# Patient Record
Sex: FEMALE | Race: WHITE | NOT HISPANIC OR LATINO | URBAN - METROPOLITAN AREA
[De-identification: names, ages, dates, MRNs, and addresses within clinical notes are randomized per-mention and may not be internally consistent; named-entity substitution may affect disease eponyms.]

---

## 2017-11-06 ENCOUNTER — EMERGENCY (EMERGENCY)
Facility: HOSPITAL | Age: 62
LOS: 1 days | Discharge: HOME | End: 2017-11-06
Admitting: EMERGENCY MEDICINE

## 2017-11-06 VITALS
TEMPERATURE: 98 F | RESPIRATION RATE: 18 BRPM | HEART RATE: 95 BPM | OXYGEN SATURATION: 99 % | DIASTOLIC BLOOD PRESSURE: 79 MMHG | SYSTOLIC BLOOD PRESSURE: 149 MMHG

## 2017-11-06 NOTE — ED ADULT TRIAGE NOTE - CHIEF COMPLAINT QUOTE
pt c/o lower back pain radiating to right groin x several weeks, worse tonight, with urinary frequency. denies fevers, chills, n/v., also c/o epigastric pain x 1 week with dyspnea on exertion. appears comfortable in triage. EKG IP

## 2018-01-01 ENCOUNTER — OUTPATIENT (OUTPATIENT)
Dept: OUTPATIENT SERVICES | Facility: HOSPITAL | Age: 63
LOS: 1 days | End: 2018-01-01
Payer: MEDICAID

## 2018-01-01 PROCEDURE — G9001: CPT

## 2018-01-10 ENCOUNTER — EMERGENCY (EMERGENCY)
Facility: HOSPITAL | Age: 63
LOS: 1 days | Discharge: HOME | End: 2018-01-10
Attending: EMERGENCY MEDICINE
Payer: MEDICAID

## 2018-01-10 VITALS
SYSTOLIC BLOOD PRESSURE: 134 MMHG | HEART RATE: 97 BPM | DIASTOLIC BLOOD PRESSURE: 80 MMHG | WEIGHT: 134.04 LBS | TEMPERATURE: 98 F | RESPIRATION RATE: 18 BRPM | OXYGEN SATURATION: 97 % | HEIGHT: 64 IN

## 2018-01-10 PROCEDURE — 99285 EMERGENCY DEPT VISIT HI MDM: CPT | Mod: 25

## 2018-01-10 NOTE — ED ADULT TRIAGE NOTE - CHIEF COMPLAINT QUOTE
patient reports urinary frequency, right lower back pain, and right upper abdominal pain. patient complaining of distended abdomen

## 2018-01-11 VITALS — OXYGEN SATURATION: 95 %

## 2018-01-11 DIAGNOSIS — R69 ILLNESS, UNSPECIFIED: ICD-10-CM

## 2018-01-11 LAB
ALBUMIN SERPL ELPH-MCNC: 3.5 G/DL — SIGNIFICANT CHANGE UP (ref 3.5–5)
ALP SERPL-CCNC: 61 U/L — SIGNIFICANT CHANGE UP (ref 40–120)
ALT FLD-CCNC: 28 U/L DA — SIGNIFICANT CHANGE UP (ref 10–60)
ANION GAP SERPL CALC-SCNC: 7 MMOL/L — SIGNIFICANT CHANGE UP (ref 5–17)
APPEARANCE UR: CLEAR — SIGNIFICANT CHANGE UP
AST SERPL-CCNC: 17 U/L — SIGNIFICANT CHANGE UP (ref 10–40)
BASE EXCESS BLDA CALC-SCNC: -1.3 MMOL/L — SIGNIFICANT CHANGE UP (ref -2–2)
BASOPHILS # BLD AUTO: 0.1 K/UL — SIGNIFICANT CHANGE UP (ref 0–0.2)
BASOPHILS NFR BLD AUTO: 1.2 % — SIGNIFICANT CHANGE UP (ref 0–2)
BILIRUB SERPL-MCNC: 0.2 MG/DL — SIGNIFICANT CHANGE UP (ref 0.2–1.2)
BILIRUB UR-MCNC: NEGATIVE — SIGNIFICANT CHANGE UP
BLOOD GAS COMMENTS ARTERIAL: SIGNIFICANT CHANGE UP
BUN SERPL-MCNC: 13 MG/DL — SIGNIFICANT CHANGE UP (ref 7–18)
CALCIUM SERPL-MCNC: 9.3 MG/DL — SIGNIFICANT CHANGE UP (ref 8.4–10.5)
CHLORIDE SERPL-SCNC: 104 MMOL/L — SIGNIFICANT CHANGE UP (ref 96–108)
CO2 SERPL-SCNC: 30 MMOL/L — SIGNIFICANT CHANGE UP (ref 22–31)
COLOR SPEC: YELLOW — SIGNIFICANT CHANGE UP
CREAT SERPL-MCNC: 0.66 MG/DL — SIGNIFICANT CHANGE UP (ref 0.5–1.3)
DIFF PNL FLD: ABNORMAL
EOSINOPHIL # BLD AUTO: 0.2 K/UL — SIGNIFICANT CHANGE UP (ref 0–0.5)
EOSINOPHIL NFR BLD AUTO: 2.6 % — SIGNIFICANT CHANGE UP (ref 0–6)
GLUCOSE SERPL-MCNC: 129 MG/DL — HIGH (ref 70–99)
GLUCOSE UR QL: NEGATIVE — SIGNIFICANT CHANGE UP
HCO3 BLDA-SCNC: 24 MMOL/L — SIGNIFICANT CHANGE UP (ref 23–27)
HCT VFR BLD CALC: 46.9 % — HIGH (ref 34.5–45)
HGB BLD-MCNC: 15 G/DL — SIGNIFICANT CHANGE UP (ref 11.5–15.5)
HOROWITZ INDEX BLDA+IHG-RTO: 28 — SIGNIFICANT CHANGE UP
KETONES UR-MCNC: NEGATIVE — SIGNIFICANT CHANGE UP
LEUKOCYTE ESTERASE UR-ACNC: ABNORMAL
LIDOCAIN IGE QN: 108 U/L — SIGNIFICANT CHANGE UP (ref 73–393)
LYMPHOCYTES # BLD AUTO: 2.4 K/UL — SIGNIFICANT CHANGE UP (ref 1–3.3)
LYMPHOCYTES # BLD AUTO: 27.1 % — SIGNIFICANT CHANGE UP (ref 13–44)
MCHC RBC-ENTMCNC: 31.1 PG — SIGNIFICANT CHANGE UP (ref 27–34)
MCHC RBC-ENTMCNC: 32 GM/DL — SIGNIFICANT CHANGE UP (ref 32–36)
MCV RBC AUTO: 97.3 FL — SIGNIFICANT CHANGE UP (ref 80–100)
MONOCYTES # BLD AUTO: 0.5 K/UL — SIGNIFICANT CHANGE UP (ref 0–0.9)
MONOCYTES NFR BLD AUTO: 5.4 % — SIGNIFICANT CHANGE UP (ref 2–14)
NEUTROPHILS # BLD AUTO: 5.5 K/UL — SIGNIFICANT CHANGE UP (ref 1.8–7.4)
NEUTROPHILS NFR BLD AUTO: 63.6 % — SIGNIFICANT CHANGE UP (ref 43–77)
NITRITE UR-MCNC: NEGATIVE — SIGNIFICANT CHANGE UP
PCO2 BLDA: 48 MMHG — HIGH (ref 32–46)
PH BLDA: 7.33 — LOW (ref 7.35–7.45)
PH UR: 5 — SIGNIFICANT CHANGE UP (ref 5–8)
PLATELET # BLD AUTO: 267 K/UL — SIGNIFICANT CHANGE UP (ref 150–400)
PO2 BLDA: 70 MMHG — LOW (ref 74–108)
POTASSIUM SERPL-MCNC: 3.4 MMOL/L — LOW (ref 3.5–5.3)
POTASSIUM SERPL-SCNC: 3.4 MMOL/L — LOW (ref 3.5–5.3)
PROT SERPL-MCNC: 7.4 G/DL — SIGNIFICANT CHANGE UP (ref 6–8.3)
PROT UR-MCNC: NEGATIVE — SIGNIFICANT CHANGE UP
RBC # BLD: 4.82 M/UL — SIGNIFICANT CHANGE UP (ref 3.8–5.2)
RBC # FLD: 12.9 % — SIGNIFICANT CHANGE UP (ref 10.3–14.5)
SAO2 % BLDA: 92 % — SIGNIFICANT CHANGE UP (ref 92–96)
SODIUM SERPL-SCNC: 141 MMOL/L — SIGNIFICANT CHANGE UP (ref 135–145)
SP GR SPEC: 1.02 — SIGNIFICANT CHANGE UP (ref 1.01–1.02)
TROPONIN I SERPL-MCNC: <0.015 NG/ML — SIGNIFICANT CHANGE UP (ref 0–0.04)
UROBILINOGEN FLD QL: 1
WBC # BLD: 8.7 K/UL — SIGNIFICANT CHANGE UP (ref 3.8–10.5)
WBC # FLD AUTO: 8.7 K/UL — SIGNIFICANT CHANGE UP (ref 3.8–10.5)

## 2018-01-11 PROCEDURE — 71046 X-RAY EXAM CHEST 2 VIEWS: CPT | Mod: 26

## 2018-01-11 PROCEDURE — 80053 COMPREHEN METABOLIC PANEL: CPT

## 2018-01-11 PROCEDURE — 82803 BLOOD GASES ANY COMBINATION: CPT

## 2018-01-11 PROCEDURE — 74177 CT ABD & PELVIS W/CONTRAST: CPT

## 2018-01-11 PROCEDURE — 83690 ASSAY OF LIPASE: CPT

## 2018-01-11 PROCEDURE — 71046 X-RAY EXAM CHEST 2 VIEWS: CPT

## 2018-01-11 PROCEDURE — 99284 EMERGENCY DEPT VISIT MOD MDM: CPT | Mod: 25

## 2018-01-11 PROCEDURE — 81001 URINALYSIS AUTO W/SCOPE: CPT

## 2018-01-11 PROCEDURE — 84484 ASSAY OF TROPONIN QUANT: CPT

## 2018-01-11 PROCEDURE — 87086 URINE CULTURE/COLONY COUNT: CPT

## 2018-01-11 PROCEDURE — 85027 COMPLETE CBC AUTOMATED: CPT

## 2018-01-11 PROCEDURE — 94640 AIRWAY INHALATION TREATMENT: CPT

## 2018-01-11 PROCEDURE — 96374 THER/PROPH/DIAG INJ IV PUSH: CPT | Mod: XU

## 2018-01-11 PROCEDURE — 83880 ASSAY OF NATRIURETIC PEPTIDE: CPT

## 2018-01-11 PROCEDURE — 93005 ELECTROCARDIOGRAM TRACING: CPT

## 2018-01-11 PROCEDURE — 74177 CT ABD & PELVIS W/CONTRAST: CPT | Mod: 26

## 2018-01-11 RX ORDER — IPRATROPIUM/ALBUTEROL SULFATE 18-103MCG
3 AEROSOL WITH ADAPTER (GRAM) INHALATION ONCE
Qty: 0 | Refills: 0 | Status: COMPLETED | OUTPATIENT
Start: 2018-01-11 | End: 2018-01-11

## 2018-01-11 RX ORDER — ALBUTEROL 90 UG/1
4 AEROSOL, METERED ORAL ONCE
Qty: 0 | Refills: 0 | Status: COMPLETED | OUTPATIENT
Start: 2018-01-11 | End: 2018-01-11

## 2018-01-11 RX ORDER — SODIUM CHLORIDE 9 MG/ML
1000 INJECTION INTRAMUSCULAR; INTRAVENOUS; SUBCUTANEOUS ONCE
Qty: 0 | Refills: 0 | Status: COMPLETED | OUTPATIENT
Start: 2018-01-11 | End: 2018-01-11

## 2018-01-11 RX ORDER — SODIUM CHLORIDE 9 MG/ML
3 INJECTION INTRAMUSCULAR; INTRAVENOUS; SUBCUTANEOUS ONCE
Qty: 0 | Refills: 0 | Status: COMPLETED | OUTPATIENT
Start: 2018-01-11 | End: 2018-01-11

## 2018-01-11 RX ORDER — MORPHINE SULFATE 50 MG/1
2 CAPSULE, EXTENDED RELEASE ORAL ONCE
Qty: 0 | Refills: 0 | Status: DISCONTINUED | OUTPATIENT
Start: 2018-01-11 | End: 2018-01-11

## 2018-01-11 RX ADMIN — MORPHINE SULFATE 2 MILLIGRAM(S): 50 CAPSULE, EXTENDED RELEASE ORAL at 02:00

## 2018-01-11 RX ADMIN — SODIUM CHLORIDE 3 MILLILITER(S): 9 INJECTION INTRAMUSCULAR; INTRAVENOUS; SUBCUTANEOUS at 01:31

## 2018-01-11 RX ADMIN — Medication 3 MILLILITER(S): at 05:52

## 2018-01-11 RX ADMIN — MORPHINE SULFATE 2 MILLIGRAM(S): 50 CAPSULE, EXTENDED RELEASE ORAL at 01:30

## 2018-01-11 RX ADMIN — SODIUM CHLORIDE 1000 MILLILITER(S): 9 INJECTION INTRAMUSCULAR; INTRAVENOUS; SUBCUTANEOUS at 01:31

## 2018-01-11 RX ADMIN — ALBUTEROL 4 PUFF(S): 90 AEROSOL, METERED ORAL at 08:02

## 2018-01-11 NOTE — ED PROVIDER NOTE - OBJECTIVE STATEMENT
61 y/o female with significant PMHx UTI, presents to the ED c/o abd pain and distension x 2 weeks. Pt notes stomach was becoming increasingly distended, which prompted visit to ED today. Pt also reports knot in RUQ of abd with associated soreness. Pt denies vomiting, diarrhea, fever, hematuria, dysuria or any other complaints.

## 2018-01-11 NOTE — ED ADULT NURSE NOTE - OBJECTIVE STATEMENT
Pt states that she is having abd pain and urinary frequency and not able to hold bladder. pt denies burning during urination, fever and chills

## 2018-01-11 NOTE — ED PROVIDER NOTE - CONDUCTED A DETAILED DISCUSSION WITH PATIENT AND/OR GUARDIAN REGARDING, MDM
need for outpatient follow-up/return to ED if symptoms worsen, persist or questions arise radiology results/need for outpatient follow-up/lab results/return to ED if symptoms worsen, persist or questions arise

## 2018-01-11 NOTE — ED PROVIDER NOTE - PROGRESS NOTE DETAILS
labs unremarkable, UA neg for uti, CT A/P neg  discussed above results with patient. On reeval patient reports abd pain resolved. On repeat VS, O2sat noted to be 87% on RA, CXR unremarkable, given duoneb in setting of smoking hx with improvement of O2sat >95% on RA patient stable for discharge. patient instructed to followup with PMD.

## 2018-01-12 LAB
CULTURE RESULTS: SIGNIFICANT CHANGE UP
SPECIMEN SOURCE: SIGNIFICANT CHANGE UP

## 2018-06-14 ENCOUNTER — APPOINTMENT (OUTPATIENT)
Dept: GASTROENTEROLOGY | Facility: CLINIC | Age: 63
End: 2018-06-14
Payer: MEDICAID

## 2018-06-14 VITALS
HEIGHT: 62 IN | DIASTOLIC BLOOD PRESSURE: 70 MMHG | TEMPERATURE: 97.7 F | WEIGHT: 139 LBS | SYSTOLIC BLOOD PRESSURE: 120 MMHG | BODY MASS INDEX: 25.58 KG/M2

## 2018-06-14 DIAGNOSIS — Z82.49 FAMILY HISTORY OF ISCHEMIC HEART DISEASE AND OTHER DISEASES OF THE CIRCULATORY SYSTEM: ICD-10-CM

## 2018-06-14 DIAGNOSIS — Z12.11 ENCOUNTER FOR SCREENING FOR MALIGNANT NEOPLASM OF COLON: ICD-10-CM

## 2018-06-14 DIAGNOSIS — Z86.39 PERSONAL HISTORY OF OTHER ENDOCRINE, NUTRITIONAL AND METABOLIC DISEASE: ICD-10-CM

## 2018-06-14 DIAGNOSIS — Z63.4 DISAPPEARANCE AND DEATH OF FAMILY MEMBER: ICD-10-CM

## 2018-06-14 DIAGNOSIS — F17.200 NICOTINE DEPENDENCE, UNSPECIFIED, UNCOMPLICATED: ICD-10-CM

## 2018-06-14 DIAGNOSIS — Z86.59 PERSONAL HISTORY OF OTHER MENTAL AND BEHAVIORAL DISORDERS: ICD-10-CM

## 2018-06-14 DIAGNOSIS — K80.20 CALCULUS OF GALLBLADDER W/OUT CHOLECYSTITIS W/OUT OBSTRUCTION: ICD-10-CM

## 2018-06-14 DIAGNOSIS — R19.4 CHANGE IN BOWEL HABIT: ICD-10-CM

## 2018-06-14 DIAGNOSIS — Z78.9 OTHER SPECIFIED HEALTH STATUS: ICD-10-CM

## 2018-06-14 DIAGNOSIS — R19.7 DIARRHEA, UNSPECIFIED: ICD-10-CM

## 2018-06-14 PROCEDURE — 99204 OFFICE O/P NEW MOD 45 MIN: CPT

## 2018-06-14 RX ORDER — MIRTAZAPINE 15 MG/1
15 TABLET, FILM COATED ORAL
Refills: 0 | Status: ACTIVE | COMMUNITY

## 2018-06-14 RX ORDER — ZOLPIDEM TARTRATE 5 MG/1
5 TABLET ORAL
Refills: 0 | Status: ACTIVE | COMMUNITY

## 2018-06-14 RX ORDER — PRAVASTATIN SODIUM 20 MG/1
20 TABLET ORAL
Refills: 0 | Status: ACTIVE | COMMUNITY

## 2018-06-14 RX ORDER — CLONAZEPAM 0.5 MG/1
0.5 TABLET ORAL
Refills: 0 | Status: ACTIVE | COMMUNITY

## 2018-06-14 RX ORDER — SIMETHICONE 80 MG/1
80 TABLET, CHEWABLE ORAL
Refills: 0 | Status: ACTIVE | COMMUNITY

## 2018-06-14 RX ORDER — PAROXETINE HYDROCHLORIDE 30 MG/1
30 TABLET, FILM COATED ORAL
Refills: 0 | Status: ACTIVE | COMMUNITY

## 2018-06-14 RX ORDER — MELOXICAM 15 MG/1
15 TABLET ORAL
Refills: 0 | Status: ACTIVE | COMMUNITY

## 2018-06-14 RX ORDER — FENOFIBRATE 160 MG/1
160 TABLET ORAL
Refills: 0 | Status: ACTIVE | COMMUNITY

## 2018-06-14 SDOH — SOCIAL STABILITY - SOCIAL INSECURITY: DISSAPEARANCE AND DEATH OF FAMILY MEMBER: Z63.4

## 2018-06-20 ENCOUNTER — OUTPATIENT (OUTPATIENT)
Dept: OUTPATIENT SERVICES | Facility: HOSPITAL | Age: 63
LOS: 1 days | Discharge: HOME | End: 2018-06-20
Payer: MEDICAID

## 2018-06-20 ENCOUNTER — APPOINTMENT (OUTPATIENT)
Dept: GASTROENTEROLOGY | Facility: HOSPITAL | Age: 63
End: 2018-06-20
Payer: MEDICAID

## 2018-06-20 ENCOUNTER — RESULT REVIEW (OUTPATIENT)
Age: 63
End: 2018-06-20

## 2018-06-20 DIAGNOSIS — Z12.11 ENCOUNTER FOR SCREENING FOR MALIGNANT NEOPLASM OF COLON: ICD-10-CM

## 2018-06-20 PROCEDURE — 88305 TISSUE EXAM BY PATHOLOGIST: CPT | Mod: 26

## 2018-06-20 PROCEDURE — 88312 SPECIAL STAINS GROUP 1: CPT | Mod: 26

## 2018-06-20 PROCEDURE — 45380 COLONOSCOPY AND BIOPSY: CPT | Mod: 33

## 2018-06-20 PROCEDURE — 43239 EGD BIOPSY SINGLE/MULTIPLE: CPT

## 2018-06-21 LAB — SURGICAL PATHOLOGY STUDY: SIGNIFICANT CHANGE UP

## 2018-06-25 PROBLEM — K80.20 GALLSTONES: Status: ACTIVE | Noted: 2018-06-25

## 2018-07-12 ENCOUNTER — APPOINTMENT (OUTPATIENT)
Dept: GASTROENTEROLOGY | Facility: CLINIC | Age: 63
End: 2018-07-12

## 2018-08-23 ENCOUNTER — RX RENEWAL (OUTPATIENT)
Age: 63
End: 2018-08-23

## 2018-08-23 RX ORDER — RIFAXIMIN 550 MG/1
550 TABLET ORAL 3 TIMES DAILY
Qty: 42 | Refills: 1 | Status: ACTIVE | COMMUNITY
Start: 2018-06-25 | End: 1900-01-01

## 2018-08-23 RX ORDER — OMEPRAZOLE 20 MG/1
20 CAPSULE, DELAYED RELEASE ORAL DAILY
Qty: 1 | Refills: 3 | Status: ACTIVE | COMMUNITY
Start: 2018-06-14 | End: 1900-01-01

## 2019-11-01 ENCOUNTER — OUTPATIENT (OUTPATIENT)
Dept: OUTPATIENT SERVICES | Facility: HOSPITAL | Age: 64
LOS: 1 days | End: 2019-11-01
Payer: MEDICAID

## 2019-11-01 PROCEDURE — G9001: CPT

## 2019-11-06 DIAGNOSIS — Z71.89 OTHER SPECIFIED COUNSELING: ICD-10-CM

## 2019-11-06 PROBLEM — N39.0 URINARY TRACT INFECTION, SITE NOT SPECIFIED: Chronic | Status: ACTIVE | Noted: 2018-01-11

## 2020-12-16 PROBLEM — Z12.11 ENCOUNTER FOR SCREENING COLONOSCOPY: Status: RESOLVED | Noted: 2018-06-14 | Resolved: 2020-12-16

## 2021-02-07 ENCOUNTER — TRANSCRIPTION ENCOUNTER (OUTPATIENT)
Age: 66
End: 2021-02-07

## 2021-03-19 ENCOUNTER — TRANSCRIPTION ENCOUNTER (OUTPATIENT)
Age: 66
End: 2021-03-19

## 2022-09-17 ENCOUNTER — NON-APPOINTMENT (OUTPATIENT)
Age: 67
End: 2022-09-17

## 2023-06-19 ENCOUNTER — NON-APPOINTMENT (OUTPATIENT)
Age: 68
End: 2023-06-19

## 2023-08-21 ENCOUNTER — INPATIENT (INPATIENT)
Facility: HOSPITAL | Age: 68
LOS: 2 days | Discharge: HOME | DRG: 190 | End: 2023-08-24
Attending: INTERNAL MEDICINE | Admitting: HOSPITALIST
Payer: COMMERCIAL

## 2023-08-21 VITALS
SYSTOLIC BLOOD PRESSURE: 140 MMHG | WEIGHT: 134.04 LBS | HEART RATE: 87 BPM | DIASTOLIC BLOOD PRESSURE: 72 MMHG | RESPIRATION RATE: 20 BRPM | HEIGHT: 62 IN | OXYGEN SATURATION: 92 % | TEMPERATURE: 98 F

## 2023-08-21 LAB — GAS PNL BLDV: SIGNIFICANT CHANGE UP

## 2023-08-21 RX ORDER — IPRATROPIUM/ALBUTEROL SULFATE 18-103MCG
3 AEROSOL WITH ADAPTER (GRAM) INHALATION
Refills: 0 | Status: DISCONTINUED | OUTPATIENT
Start: 2023-08-21 | End: 2023-08-22

## 2023-08-21 NOTE — ED ADULT NURSE NOTE - CCCP TRG CHIEF CMPLNT
Daughter at bedside, updated on plan of care, denies needs.       Natalia Cervantes RN  08/07/22 9721 abnormal EKG/shortness of breath

## 2023-08-21 NOTE — ED ADULT NURSE NOTE - OBJECTIVE STATEMENT
PT is a 69yo f bibems c/o SOB x 4 days. Pt states that she was at home relaxing and felt SOB that she has been feeling over the past 4 days, pt daughter ordered pulse oximeter and reading obtained was 84% on RA. PT states that she received her flu shot approx 4 days ago and has been feeling weak and tired ever since, had an episode of medial back pain yesterday radiating across, but resolved. PT states that she went to see her cardiologist and received abnormal  EKG, was scheduled for outpatient echo and stress test sept 13. Pt has hx of smoking for approx 40 years, but quit 4 months ago. PMH of DM, HLD. PT A,Ox4, ambulatory at baseline, pt placed on cardiac monitor, in NSR, placed on 4LNC. Respirations even and unlabored, abd soft, nondistended and nontender, skin warm, dry and intact, BARTON. Denies HA, n/v/d, fever, chills and urinary symptoms. Stretcher locked in lowest position, appropriate side rails up for safety, pt instructed to call for RN if anything needed.

## 2023-08-22 DIAGNOSIS — R06.02 SHORTNESS OF BREATH: ICD-10-CM

## 2023-08-22 DIAGNOSIS — R09.02 HYPOXEMIA: ICD-10-CM

## 2023-08-22 DIAGNOSIS — Z96.641 PRESENCE OF RIGHT ARTIFICIAL HIP JOINT: Chronic | ICD-10-CM

## 2023-08-22 DIAGNOSIS — J44.1 CHRONIC OBSTRUCTIVE PULMONARY DISEASE WITH (ACUTE) EXACERBATION: ICD-10-CM

## 2023-08-22 DIAGNOSIS — J96.01 ACUTE RESPIRATORY FAILURE WITH HYPOXIA: ICD-10-CM

## 2023-08-22 LAB
A1C WITH ESTIMATED AVERAGE GLUCOSE RESULT: 6.5 % — HIGH (ref 4–5.6)
ALBUMIN SERPL ELPH-MCNC: 4.7 G/DL — SIGNIFICANT CHANGE UP (ref 3.3–5)
ALBUMIN SERPL ELPH-MCNC: 4.8 G/DL — SIGNIFICANT CHANGE UP (ref 3.3–5)
ALP SERPL-CCNC: 32 U/L — LOW (ref 40–120)
ALP SERPL-CCNC: 32 U/L — LOW (ref 40–120)
ALT FLD-CCNC: 12 U/L — SIGNIFICANT CHANGE UP (ref 10–45)
ALT FLD-CCNC: 14 U/L — SIGNIFICANT CHANGE UP (ref 10–45)
ANION GAP SERPL CALC-SCNC: 12 MMOL/L — SIGNIFICANT CHANGE UP (ref 5–17)
ANION GAP SERPL CALC-SCNC: 14 MMOL/L — SIGNIFICANT CHANGE UP (ref 5–17)
AST SERPL-CCNC: 15 U/L — SIGNIFICANT CHANGE UP (ref 10–40)
AST SERPL-CCNC: 21 U/L — SIGNIFICANT CHANGE UP (ref 10–40)
BASE EXCESS BLDV CALC-SCNC: 2.8 MMOL/L — SIGNIFICANT CHANGE UP (ref -2–3)
BASOPHILS # BLD AUTO: 0.03 K/UL — SIGNIFICANT CHANGE UP (ref 0–0.2)
BASOPHILS # BLD AUTO: 0.06 K/UL — SIGNIFICANT CHANGE UP (ref 0–0.2)
BASOPHILS NFR BLD AUTO: 0.5 % — SIGNIFICANT CHANGE UP (ref 0–2)
BASOPHILS NFR BLD AUTO: 1 % — SIGNIFICANT CHANGE UP (ref 0–2)
BILIRUB SERPL-MCNC: 0.2 MG/DL — SIGNIFICANT CHANGE UP (ref 0.2–1.2)
BILIRUB SERPL-MCNC: 0.3 MG/DL — SIGNIFICANT CHANGE UP (ref 0.2–1.2)
BUN SERPL-MCNC: 17 MG/DL — SIGNIFICANT CHANGE UP (ref 7–23)
BUN SERPL-MCNC: 20 MG/DL — SIGNIFICANT CHANGE UP (ref 7–23)
CA-I SERPL-SCNC: 1.19 MMOL/L — SIGNIFICANT CHANGE UP (ref 1.15–1.33)
CALCIUM SERPL-MCNC: 10.1 MG/DL — SIGNIFICANT CHANGE UP (ref 8.4–10.5)
CALCIUM SERPL-MCNC: 10.2 MG/DL — SIGNIFICANT CHANGE UP (ref 8.4–10.5)
CHLORIDE BLDV-SCNC: 103 MMOL/L — SIGNIFICANT CHANGE UP (ref 96–108)
CHLORIDE SERPL-SCNC: 104 MMOL/L — SIGNIFICANT CHANGE UP (ref 96–108)
CHLORIDE SERPL-SCNC: 104 MMOL/L — SIGNIFICANT CHANGE UP (ref 96–108)
CHOLEST SERPL-MCNC: 220 MG/DL — HIGH
CO2 BLDV-SCNC: 31 MMOL/L — HIGH (ref 22–26)
CO2 SERPL-SCNC: 23 MMOL/L — SIGNIFICANT CHANGE UP (ref 22–31)
CO2 SERPL-SCNC: 26 MMOL/L — SIGNIFICANT CHANGE UP (ref 22–31)
CREAT SERPL-MCNC: 0.62 MG/DL — SIGNIFICANT CHANGE UP (ref 0.5–1.3)
CREAT SERPL-MCNC: 0.63 MG/DL — SIGNIFICANT CHANGE UP (ref 0.5–1.3)
D DIMER BLD IA.RAPID-MCNC: <150 NG/ML DDU — SIGNIFICANT CHANGE UP
EGFR: 97 ML/MIN/1.73M2 — SIGNIFICANT CHANGE UP
EGFR: 97 ML/MIN/1.73M2 — SIGNIFICANT CHANGE UP
EOSINOPHIL # BLD AUTO: 0.16 K/UL — SIGNIFICANT CHANGE UP (ref 0–0.5)
EOSINOPHIL # BLD AUTO: 0.2 K/UL — SIGNIFICANT CHANGE UP (ref 0–0.5)
EOSINOPHIL NFR BLD AUTO: 2.5 % — SIGNIFICANT CHANGE UP (ref 0–6)
EOSINOPHIL NFR BLD AUTO: 3.3 % — SIGNIFICANT CHANGE UP (ref 0–6)
ESTIMATED AVERAGE GLUCOSE: 140 MG/DL — HIGH (ref 68–114)
GAS PNL BLDV: 134 MMOL/L — LOW (ref 136–145)
GAS PNL BLDV: SIGNIFICANT CHANGE UP
GLUCOSE BLDC GLUCOMTR-MCNC: 116 MG/DL — HIGH (ref 70–99)
GLUCOSE BLDC GLUCOMTR-MCNC: 137 MG/DL — HIGH (ref 70–99)
GLUCOSE BLDC GLUCOMTR-MCNC: 156 MG/DL — HIGH (ref 70–99)
GLUCOSE BLDC GLUCOMTR-MCNC: 214 MG/DL — HIGH (ref 70–99)
GLUCOSE BLDV-MCNC: 94 MG/DL — SIGNIFICANT CHANGE UP (ref 70–99)
GLUCOSE SERPL-MCNC: 102 MG/DL — HIGH (ref 70–99)
GLUCOSE SERPL-MCNC: 117 MG/DL — HIGH (ref 70–99)
HCO3 BLDV-SCNC: 29 MMOL/L — SIGNIFICANT CHANGE UP (ref 22–29)
HCT VFR BLD CALC: 44.9 % — SIGNIFICANT CHANGE UP (ref 34.5–45)
HCT VFR BLD CALC: 46.5 % — HIGH (ref 34.5–45)
HCT VFR BLDA CALC: 42 % — SIGNIFICANT CHANGE UP (ref 34.5–46.5)
HDLC SERPL-MCNC: 62 MG/DL — SIGNIFICANT CHANGE UP
HGB BLD CALC-MCNC: 14.1 G/DL — SIGNIFICANT CHANGE UP (ref 11.7–16.1)
HGB BLD-MCNC: 14.5 G/DL — SIGNIFICANT CHANGE UP (ref 11.5–15.5)
HGB BLD-MCNC: 14.8 G/DL — SIGNIFICANT CHANGE UP (ref 11.5–15.5)
HOROWITZ INDEX BLDV+IHG-RTO: SIGNIFICANT CHANGE UP
IMM GRANULOCYTES NFR BLD AUTO: 0.3 % — SIGNIFICANT CHANGE UP (ref 0–0.9)
IMM GRANULOCYTES NFR BLD AUTO: 0.5 % — SIGNIFICANT CHANGE UP (ref 0–0.9)
LACTATE BLDV-MCNC: 2.1 MMOL/L — HIGH (ref 0.5–2)
LIPID PNL WITH DIRECT LDL SERPL: 142 MG/DL — HIGH
LYMPHOCYTES # BLD AUTO: 1.72 K/UL — SIGNIFICANT CHANGE UP (ref 1–3.3)
LYMPHOCYTES # BLD AUTO: 1.82 K/UL — SIGNIFICANT CHANGE UP (ref 1–3.3)
LYMPHOCYTES # BLD AUTO: 26.6 % — SIGNIFICANT CHANGE UP (ref 13–44)
LYMPHOCYTES # BLD AUTO: 29.6 % — SIGNIFICANT CHANGE UP (ref 13–44)
MAGNESIUM SERPL-MCNC: 2 MG/DL — SIGNIFICANT CHANGE UP (ref 1.6–2.6)
MCHC RBC-ENTMCNC: 29.7 PG — SIGNIFICANT CHANGE UP (ref 27–34)
MCHC RBC-ENTMCNC: 30 PG — SIGNIFICANT CHANGE UP (ref 27–34)
MCHC RBC-ENTMCNC: 31.8 GM/DL — LOW (ref 32–36)
MCHC RBC-ENTMCNC: 32.3 GM/DL — SIGNIFICANT CHANGE UP (ref 32–36)
MCV RBC AUTO: 93 FL — SIGNIFICANT CHANGE UP (ref 80–100)
MCV RBC AUTO: 93.4 FL — SIGNIFICANT CHANGE UP (ref 80–100)
MONOCYTES # BLD AUTO: 0.47 K/UL — SIGNIFICANT CHANGE UP (ref 0–0.9)
MONOCYTES # BLD AUTO: 0.48 K/UL — SIGNIFICANT CHANGE UP (ref 0–0.9)
MONOCYTES NFR BLD AUTO: 7.4 % — SIGNIFICANT CHANGE UP (ref 2–14)
MONOCYTES NFR BLD AUTO: 7.6 % — SIGNIFICANT CHANGE UP (ref 2–14)
NEUTROPHILS # BLD AUTO: 3.57 K/UL — SIGNIFICANT CHANGE UP (ref 1.8–7.4)
NEUTROPHILS # BLD AUTO: 4.06 K/UL — SIGNIFICANT CHANGE UP (ref 1.8–7.4)
NEUTROPHILS NFR BLD AUTO: 58 % — SIGNIFICANT CHANGE UP (ref 43–77)
NEUTROPHILS NFR BLD AUTO: 62.7 % — SIGNIFICANT CHANGE UP (ref 43–77)
NON HDL CHOLESTEROL: 159 MG/DL — HIGH
NRBC # BLD: 0 /100 WBCS — SIGNIFICANT CHANGE UP (ref 0–0)
NRBC # BLD: 0 /100 WBCS — SIGNIFICANT CHANGE UP (ref 0–0)
NT-PROBNP SERPL-SCNC: 53 PG/ML — SIGNIFICANT CHANGE UP (ref 0–300)
PCO2 BLDV: 52 MMHG — HIGH (ref 39–42)
PH BLDV: 7.36 — SIGNIFICANT CHANGE UP (ref 7.32–7.43)
PHOSPHATE SERPL-MCNC: 3.5 MG/DL — SIGNIFICANT CHANGE UP (ref 2.5–4.5)
PLATELET # BLD AUTO: 355 K/UL — SIGNIFICANT CHANGE UP (ref 150–400)
PLATELET # BLD AUTO: 365 K/UL — SIGNIFICANT CHANGE UP (ref 150–400)
PO2 BLDV: 44 MMHG — SIGNIFICANT CHANGE UP (ref 25–45)
POTASSIUM BLDV-SCNC: 8.5 MMOL/L — CRITICAL HIGH (ref 3.5–5.1)
POTASSIUM SERPL-MCNC: 4.2 MMOL/L — SIGNIFICANT CHANGE UP (ref 3.5–5.3)
POTASSIUM SERPL-MCNC: 4.8 MMOL/L — SIGNIFICANT CHANGE UP (ref 3.5–5.3)
POTASSIUM SERPL-SCNC: 4.2 MMOL/L — SIGNIFICANT CHANGE UP (ref 3.5–5.3)
POTASSIUM SERPL-SCNC: 4.8 MMOL/L — SIGNIFICANT CHANGE UP (ref 3.5–5.3)
PROT SERPL-MCNC: 7.4 G/DL — SIGNIFICANT CHANGE UP (ref 6–8.3)
PROT SERPL-MCNC: 7.4 G/DL — SIGNIFICANT CHANGE UP (ref 6–8.3)
RBC # BLD: 4.83 M/UL — SIGNIFICANT CHANGE UP (ref 3.8–5.2)
RBC # BLD: 4.98 M/UL — SIGNIFICANT CHANGE UP (ref 3.8–5.2)
RBC # FLD: 13.9 % — SIGNIFICANT CHANGE UP (ref 10.3–14.5)
RBC # FLD: 13.9 % — SIGNIFICANT CHANGE UP (ref 10.3–14.5)
SAO2 % BLDV: 74.6 % — SIGNIFICANT CHANGE UP (ref 67–88)
SODIUM SERPL-SCNC: 141 MMOL/L — SIGNIFICANT CHANGE UP (ref 135–145)
SODIUM SERPL-SCNC: 142 MMOL/L — SIGNIFICANT CHANGE UP (ref 135–145)
TRIGL SERPL-MCNC: 93 MG/DL — SIGNIFICANT CHANGE UP
TROPONIN T, HIGH SENSITIVITY RESULT: <6 NG/L — SIGNIFICANT CHANGE UP (ref 0–51)
WBC # BLD: 6.15 K/UL — SIGNIFICANT CHANGE UP (ref 3.8–10.5)
WBC # BLD: 6.47 K/UL — SIGNIFICANT CHANGE UP (ref 3.8–10.5)
WBC # FLD AUTO: 6.15 K/UL — SIGNIFICANT CHANGE UP (ref 3.8–10.5)
WBC # FLD AUTO: 6.47 K/UL — SIGNIFICANT CHANGE UP (ref 3.8–10.5)

## 2023-08-22 PROCEDURE — 99222 1ST HOSP IP/OBS MODERATE 55: CPT | Mod: GC

## 2023-08-22 PROCEDURE — 71250 CT THORAX DX C-: CPT | Mod: 26

## 2023-08-22 PROCEDURE — 76705 ECHO EXAM OF ABDOMEN: CPT | Mod: 26

## 2023-08-22 PROCEDURE — 71045 X-RAY EXAM CHEST 1 VIEW: CPT | Mod: 26

## 2023-08-22 PROCEDURE — 93306 TTE W/DOPPLER COMPLETE: CPT | Mod: 26

## 2023-08-22 RX ORDER — IPRATROPIUM/ALBUTEROL SULFATE 18-103MCG
3 AEROSOL WITH ADAPTER (GRAM) INHALATION EVERY 6 HOURS
Refills: 0 | Status: DISCONTINUED | OUTPATIENT
Start: 2023-08-22 | End: 2023-08-24

## 2023-08-22 RX ORDER — MIRTAZAPINE 45 MG/1
45 TABLET, ORALLY DISINTEGRATING ORAL AT BEDTIME
Refills: 0 | Status: DISCONTINUED | OUTPATIENT
Start: 2023-08-22 | End: 2023-08-24

## 2023-08-22 RX ORDER — DEXTROSE 50 % IN WATER 50 %
12.5 SYRINGE (ML) INTRAVENOUS ONCE
Refills: 0 | Status: DISCONTINUED | OUTPATIENT
Start: 2023-08-22 | End: 2023-08-24

## 2023-08-22 RX ORDER — OXYBUTYNIN CHLORIDE 5 MG
5 TABLET ORAL
Refills: 0 | Status: DISCONTINUED | OUTPATIENT
Start: 2023-08-22 | End: 2023-08-24

## 2023-08-22 RX ORDER — ACETAMINOPHEN 500 MG
650 TABLET ORAL EVERY 6 HOURS
Refills: 0 | Status: DISCONTINUED | OUTPATIENT
Start: 2023-08-22 | End: 2023-08-24

## 2023-08-22 RX ORDER — SODIUM CHLORIDE 9 MG/ML
1000 INJECTION, SOLUTION INTRAVENOUS
Refills: 0 | Status: DISCONTINUED | OUTPATIENT
Start: 2023-08-22 | End: 2023-08-24

## 2023-08-22 RX ORDER — DEXTROSE 50 % IN WATER 50 %
25 SYRINGE (ML) INTRAVENOUS ONCE
Refills: 0 | Status: DISCONTINUED | OUTPATIENT
Start: 2023-08-22 | End: 2023-08-24

## 2023-08-22 RX ORDER — AZITHROMYCIN 500 MG/1
500 TABLET, FILM COATED ORAL EVERY 24 HOURS
Refills: 0 | Status: COMPLETED | OUTPATIENT
Start: 2023-08-23 | End: 2023-08-24

## 2023-08-22 RX ORDER — QUETIAPINE FUMARATE 200 MG/1
100 TABLET, FILM COATED ORAL AT BEDTIME
Refills: 0 | Status: DISCONTINUED | OUTPATIENT
Start: 2023-08-22 | End: 2023-08-24

## 2023-08-22 RX ORDER — INSULIN LISPRO 100/ML
VIAL (ML) SUBCUTANEOUS AT BEDTIME
Refills: 0 | Status: DISCONTINUED | OUTPATIENT
Start: 2023-08-22 | End: 2023-08-24

## 2023-08-22 RX ORDER — INSULIN LISPRO 100/ML
VIAL (ML) SUBCUTANEOUS
Refills: 0 | Status: DISCONTINUED | OUTPATIENT
Start: 2023-08-22 | End: 2023-08-24

## 2023-08-22 RX ORDER — PANTOPRAZOLE SODIUM 20 MG/1
40 TABLET, DELAYED RELEASE ORAL
Refills: 0 | Status: DISCONTINUED | OUTPATIENT
Start: 2023-08-22 | End: 2023-08-24

## 2023-08-22 RX ORDER — QUETIAPINE FUMARATE 200 MG/1
100 TABLET, FILM COATED ORAL ONCE
Refills: 0 | Status: COMPLETED | OUTPATIENT
Start: 2023-08-22 | End: 2023-08-22

## 2023-08-22 RX ORDER — GLUCAGON INJECTION, SOLUTION 0.5 MG/.1ML
1 INJECTION, SOLUTION SUBCUTANEOUS ONCE
Refills: 0 | Status: DISCONTINUED | OUTPATIENT
Start: 2023-08-22 | End: 2023-08-24

## 2023-08-22 RX ORDER — CLONAZEPAM 1 MG
0.5 TABLET ORAL
Refills: 0 | Status: DISCONTINUED | OUTPATIENT
Start: 2023-08-22 | End: 2023-08-24

## 2023-08-22 RX ORDER — PIPERACILLIN AND TAZOBACTAM 4; .5 G/20ML; G/20ML
3.38 INJECTION, POWDER, LYOPHILIZED, FOR SOLUTION INTRAVENOUS ONCE
Refills: 0 | Status: COMPLETED | OUTPATIENT
Start: 2023-08-22 | End: 2023-08-22

## 2023-08-22 RX ORDER — PIPERACILLIN AND TAZOBACTAM 4; .5 G/20ML; G/20ML
3.38 INJECTION, POWDER, LYOPHILIZED, FOR SOLUTION INTRAVENOUS ONCE
Refills: 0 | Status: DISCONTINUED | OUTPATIENT
Start: 2023-08-22 | End: 2023-08-22

## 2023-08-22 RX ORDER — ATORVASTATIN CALCIUM 80 MG/1
10 TABLET, FILM COATED ORAL AT BEDTIME
Refills: 0 | Status: DISCONTINUED | OUTPATIENT
Start: 2023-08-22 | End: 2023-08-24

## 2023-08-22 RX ORDER — IPRATROPIUM/ALBUTEROL SULFATE 18-103MCG
3 AEROSOL WITH ADAPTER (GRAM) INHALATION EVERY 6 HOURS
Refills: 0 | Status: DISCONTINUED | OUTPATIENT
Start: 2023-08-22 | End: 2023-08-22

## 2023-08-22 RX ORDER — ENOXAPARIN SODIUM 100 MG/ML
40 INJECTION SUBCUTANEOUS EVERY 24 HOURS
Refills: 0 | Status: DISCONTINUED | OUTPATIENT
Start: 2023-08-22 | End: 2023-08-24

## 2023-08-22 RX ORDER — MIRTAZAPINE 45 MG/1
45 TABLET, ORALLY DISINTEGRATING ORAL ONCE
Refills: 0 | Status: COMPLETED | OUTPATIENT
Start: 2023-08-22 | End: 2023-08-22

## 2023-08-22 RX ORDER — AZITHROMYCIN 500 MG/1
500 TABLET, FILM COATED ORAL DAILY
Refills: 0 | Status: DISCONTINUED | OUTPATIENT
Start: 2023-08-22 | End: 2023-08-22

## 2023-08-22 RX ORDER — DEXTROSE 50 % IN WATER 50 %
15 SYRINGE (ML) INTRAVENOUS ONCE
Refills: 0 | Status: DISCONTINUED | OUTPATIENT
Start: 2023-08-22 | End: 2023-08-24

## 2023-08-22 RX ADMIN — Medication 3 MILLILITER(S): at 17:39

## 2023-08-22 RX ADMIN — Medication 0.5 MILLIGRAM(S): at 17:39

## 2023-08-22 RX ADMIN — MIRTAZAPINE 45 MILLIGRAM(S): 45 TABLET, ORALLY DISINTEGRATING ORAL at 06:54

## 2023-08-22 RX ADMIN — Medication 3 MILLILITER(S): at 22:23

## 2023-08-22 RX ADMIN — QUETIAPINE FUMARATE 100 MILLIGRAM(S): 200 TABLET, FILM COATED ORAL at 21:19

## 2023-08-22 RX ADMIN — MIRTAZAPINE 45 MILLIGRAM(S): 45 TABLET, ORALLY DISINTEGRATING ORAL at 22:01

## 2023-08-22 RX ADMIN — Medication 2: at 17:50

## 2023-08-22 RX ADMIN — QUETIAPINE FUMARATE 100 MILLIGRAM(S): 200 TABLET, FILM COATED ORAL at 06:53

## 2023-08-22 RX ADMIN — Medication 30 MILLIGRAM(S): at 21:19

## 2023-08-22 RX ADMIN — AZITHROMYCIN 500 MILLIGRAM(S): 500 TABLET, FILM COATED ORAL at 15:12

## 2023-08-22 RX ADMIN — ENOXAPARIN SODIUM 40 MILLIGRAM(S): 100 INJECTION SUBCUTANEOUS at 11:50

## 2023-08-22 RX ADMIN — ATORVASTATIN CALCIUM 10 MILLIGRAM(S): 80 TABLET, FILM COATED ORAL at 21:20

## 2023-08-22 RX ADMIN — Medication 5 MILLIGRAM(S): at 17:39

## 2023-08-22 RX ADMIN — Medication 125 MILLIGRAM(S): at 11:50

## 2023-08-22 RX ADMIN — Medication 3 MILLILITER(S): at 00:13

## 2023-08-22 RX ADMIN — Medication 1: at 11:49

## 2023-08-22 RX ADMIN — Medication 3 MILLILITER(S): at 11:49

## 2023-08-22 RX ADMIN — PIPERACILLIN AND TAZOBACTAM 200 GRAM(S): 4; .5 INJECTION, POWDER, LYOPHILIZED, FOR SOLUTION INTRAVENOUS at 17:39

## 2023-08-22 NOTE — H&P ADULT - HISTORY OF PRESENT ILLNESS
Patient is a 68 year old female with 47 year 1PPD smoking history, well controlled HTN, HLD,  DM2, GERD, overactive bladder, and anxiety, who presents with a concern for shortness of breath. Per patient, about a month ago she started to notice shortness of breath with exertion (could not walk more than a block, climb a few stairs) without feeling short of breath. She was previously active (mowing lawn, housework, taking care of grandkids), and become worried with this deconditioning. She has also had a dry cough with some sputum production on occasion  over the last month. She also felt palpitations, chest pain, and L arm pain. Additionally, she reported 3-4 falls associated with feeling dizzy/SOB/having palpitations. She lives on the basement floor of her daughters house. Follows up with a cardiologist outpatient who scheduled a stress test scheduled for a month from now      Patient additionally reports 9/10 RUQ pain not associated with food consumption and increased abdominal girth over the last few months  Patient also has an occasional 6/10 R sided headache which resolves with Tylenol, not associated with migraine symptoms or jaw claudication.   She is UTD on Covid, Flu, and PNA vaccines.   Denies leg swelling, recent travel, immobility of legs.   Denies cold, pain with swallowing, n/v/d/c/f/c.Denies urinary symptoms. Denies weakness, numbness, or tingling.

## 2023-08-22 NOTE — H&P ADULT - PROBLEM SELECTOR PLAN 3
Patient has a history of T2DM on Metformin at home, last A1c reported 6.4 as per patient   PLAN  - HbA1c   - mISS

## 2023-08-22 NOTE — H&P ADULT - PROBLEM SELECTOR PLAN 5
Patient has a history of overactive bladder, on Mirabegron and Fesoterodine at home   PLAN  - continue home meds

## 2023-08-22 NOTE — ED PROVIDER NOTE - PROGRESS NOTE DETAILS
Lab work grossly unremarkable.  CXR shows clear lungs.  Admit to medicine for further cardiac and pulmonary work-up.

## 2023-08-22 NOTE — PATIENT PROFILE ADULT - FALL HARM RISK - UNIVERSAL INTERVENTIONS
Bed in lowest position, wheels locked, appropriate side rails in place/Call bell, personal items and telephone in reach/Instruct patient to call for assistance before getting out of bed or chair/Non-slip footwear when patient is out of bed/[place] to call system/Physically safe environment - no spills, clutter or unnecessary equipment/Purposeful Proactive Rounding/Room/bathroom lighting operational, light cord in reach

## 2023-08-22 NOTE — H&P ADULT - ATTENDING COMMENTS
68F with a PMH of HTN, HLD, DM2, GERD, OAB, Anxiety and a 47 pack year smoking hx, recently quit 1 month ago presented with progressive SOB admitted for COPD Exacerbation.     Seen and examined with team. Lungs clear, nontoxic appearing. She lives in the basement of her daughter's home which has 4 steps into the house and 8 steps down/up to ground floor. She has been confined to the basement due to SOB. She saw a cardiologist who ordered a stress test. EKG and Trops wnl. CT Chest revealed significant Emphysema.    Lactate elevated, lungs clear  Hypoxic to 80s, now 99% on 5LNC, wean as tolerated  CXR clear, no signs of infection    Acute Exacerbation of Newly Diagnosed COPD  Acute Hypoxic Respiratory Failure due to above  Pulmonary nodule, 5mm - 6 month f/u with repeat CT Chest  continue NC, taper  Give 125mg Solumedrol IVP now, continue 40mg q12h for today, can switch to prednisone tomorrow  6MWT, PT Eval, may need O2 at home  Duonebs q6h  Azithromycin for 5 days  DVT ppx  Obtain TTE to assess for pHTN  Outpt Pulmonary evaluation  d/w pt and team in detail at the bedside

## 2023-08-22 NOTE — H&P ADULT - NSHPSOCIALHISTORY_GEN_ALL_CORE
, was previously with  of 35 years  lives with daughter and son in law in basement of house; bathroom on second floor   7 grandchildren  never worked  not sexually active   good diet

## 2023-08-22 NOTE — H&P ADULT - SOCIAL HISTORY: TOBACCO USE
47 year smoking history, quit 4 months ago. was smoking 7-8 cigarettes a day last few months  previously smoking 1ppd

## 2023-08-22 NOTE — H&P ADULT - PROBLEM SELECTOR PLAN 6
Patient has a history of anxiety on Mirtazipine, clonazepam, quetiapine, and paroxetine at home  PLAN  - continue home meds

## 2023-08-22 NOTE — ED PROVIDER NOTE - PHYSICAL EXAMINATION
GENERAL: well appearing in no acute distress, non-toxic appearing  HEAD: normocephalic, atraumatic  HEENT: normal conjunctiva, oral mucosa moist, uvula midline  CARDIAC: regular rate and rhythm, normal S1S2, no appreciable murmurs  PULM: decreased breath sounds b/l, clear to ascultation bilaterally, speaking in full sentences  GI: abdomen nondistended, soft, nontender, no guarding, rebound tenderness  : no CVA tenderness b/l, no suprapubic tenderness  NEURO: moving all 4 extremities, no focal deficits, normal speech, AAOx3   MSK: no peripheral edema, no calf tenderness b/l  SKIN: well-perfused, extremities warm  PSYCH: appropriate mood and affect

## 2023-08-22 NOTE — ED PROVIDER NOTE - CLINICAL SUMMARY MEDICAL DECISION MAKING FREE TEXT BOX
68-year-old female with history of HTN, HLD presents with exertional shortness of breath and some chest pressure.  Concerning for CHF versus COPD however patient has not been evaluated for either.  Labs, EKG, CXR, UA/urine culture, DuoNebs ordered.  Likely admission for pulmonology and cardiology evaluation.

## 2023-08-22 NOTE — CONSULT NOTE ADULT - ASSESSMENT
Patient is a 68 year old female with 47 year 1PPD smoking history, well controlled HTN, HLD,  DM2, GERD, overactive bladder, and anxiety, who presents with a concern for shortness of breath. The patient is hemodynamically stable afebrile, non-tachycardic, and normotensive. Physical exam of the abdomen is soft, non-tender,distended with negative Powell's sign. Laboratory values are WBCs 6.47k and normal LFTs with elevated lactate levels (2.1). Ultrasound shows a 1.0 cm stone in the gallbladder neck with mural thickening and positive sonographic Powell's sign. Although the imaging findings suggest acute cholecystitis, the patient's laboratory values normal WBCs and LFTs and benign abdominal exam makes biliary colic more likely.     Recommendations:   - HIDA scan to help determine whether the patient requires antibiotics.  - Optimize the patient from a respiratory standpoint.  - Outpatient follow up    Plans discussed with Dr. Kelsie De La Rosa, PGY-2  General Surgery

## 2023-08-22 NOTE — H&P ADULT - NSICDXPASTMEDICALHX_GEN_ALL_CORE_FT
PAST MEDICAL HISTORY:  Hyperlipidemia     Hypertension     Overactive bladder     Type 2 diabetes mellitus

## 2023-08-22 NOTE — ED PROVIDER NOTE - CARE PLAN
Principal Discharge DX:	Shortness of breath   1 Principal Discharge DX:	Hypoxia  Secondary Diagnosis:	Dyspnea on exertion

## 2023-08-22 NOTE — H&P ADULT - PROBLEM SELECTOR PLAN 1
Patient presented with SOB with extertion and at rest, falls, and dry cough with occasional sputum production as well as chest pain, palpitations, L sided arm pain. Patient also reports some RUQ pain and increasing abdominal girth  PE CTABL   CBC CMP WNL  ABG WNL   CXR unremarkable  Sat 95 on 4L NC    PLAN  - continue O2  - CT chest ordered  - RUQ US   - duonebs  - echo  - f/u EKG Patient presented with SOB with extertion and at rest, falls, and dry cough with occasional sputum production as well as chest pain, palpitations, L sided arm pain. Patient also reports some RUQ pain and increasing abdominal girth  PE CTABL   CBC CMP WNL  ABG WNL   CXR unremarkable  Sat 95 on 4L NC    PLAN  - continue O2  - azithromycin prophylaxis x 5 d  -- duonebs q6  - IV methylpred x1 followed by pred taper  - f/u RUQ US   - f/u echo  - f/u EKG  - f/u PT recs  - f/u lactate in AM

## 2023-08-22 NOTE — ED PROVIDER NOTE - OBJECTIVE STATEMENT
68-year-old female with a history of HTN, HLD presents with chest pressure, shortness of breath, increasing dyspnea on exertion.  Patient reports that she has had increasing shortness of breath with exertion that has been progressing over the last week.  Patient reports that she contacted her cardiologist who she saw for the first time recently who recommended she return to his office for cardiac work-up.  Patient was then feeling worse today, contacted her cardiologist and told her to present to the ED today.  Patient was a lifelong smoker until 4 months ago.  Otherwise patient has no headache, N/V/D/abdominal pain, dysuria, fever/chills. 68-year-old female with a history of HTN, HLD presents with chest pressure, shortness of breath, increasing dyspnea on exertion. On presentation to the ED patient was hypoxic into the high 80s and started on 4 L O2 nasal cannula.  Patient reports that she has had increasing shortness of breath with exertion that has been progressing over the last week.  Patient reports that she contacted her cardiologist who she saw for the first time recently who recommended she return to his office for cardiac work-up.  Patient was then feeling worse today, contacted her cardiologist and told her to present to the ED today.  Patient was a lifelong smoker until 4 months ago.  Otherwise patient has no headache, N/V/D/abdominal pain, dysuria, fever/chills.

## 2023-08-22 NOTE — H&P ADULT - PROBLEM SELECTOR PLAN 2
Patient has a history of HLD, on fenofibrate and pravastatin at home  PLAN  - lipid panel  - continue home meds

## 2023-08-22 NOTE — H&P ADULT - PROBLEM SELECTOR PLAN 7
F: replete as needed  E: replete as needed  N: renal diet  GI: pantoprozole 40 mg   DVT  Dispo: pending workup  FULL CODE F: replete as needed  E: replete as needed  N: renal diet  GI: pantoprozole 40 mg   DVT: Lovenox  Dispo: pending PT eval and workup   FULL CODE

## 2023-08-22 NOTE — CONSULT NOTE ADULT - SUBJECTIVE AND OBJECTIVE BOX
SURGERY CONSULT NOTE  --------------------------------------------------------------------------------------------  Patient is a 68y old  Female who presents with a chief complaint of shortness of breath (22 Aug 2023 08:15)    HPI:   Patient is a 68 year old female with 47 year 1PPD smoking history, well controlled HTN, HLD,  DM2, GERD, overactive bladder, and anxiety, who presents with a concern for shortness of breath. Per patient, about a month ago she started to notice shortness of breath with exertion (could not walk more than a block, climb a few stairs) without feeling short of breath. She was previously active (mowing lawn, housework, taking care of grandkids), and become worried with this deconditioning. She has also had a dry cough with some sputum production on occasion  over the last month. She also felt palpitations, chest pain, and L arm pain. Additionally, she reported 3-4 falls associated with feeling dizzy/SOB/having palpitations. She lives on the basement floor of her daughters house. Follows up with a cardiologist outpatient who scheduled a stress test scheduled for a month from now      Patient additionally reports 9/10 RUQ pain not associated with food consumption and increased abdominal girth over the last few months.  Patient also has an occasional 6/10 R sided headache which resolves with Tylenol, not associated with migraine symptoms or jaw claudication.   She is UTD on Covid, Flu, and PNA vaccines.   Denies leg swelling, recent travel, immobility of legs.   Denies cold, pain with swallowing, n/v/d/c/f/c.Denies urinary symptoms. Denies weakness, numbness, or tingling.  (22 Aug 2023 08:15)    PAST MEDICAL & SURGICAL HISTORY:  Hypertension  Hyperlipidemia  Type 2 diabetes mellitus  Overactive bladder  S/P hip replacement, right        FAMILY HISTORY:      SOCIAL HISTORY:      ALLERGIES: No Known Allergies      HOME MEDICATIONS:     CURRENT MEDICATIONS  MEDICATIONS (STANDING): albuterol/ipratropium for Nebulization 3 milliLiter(s) Nebulizer every 6 hours  atorvastatin 10 milliGRAM(s) Oral at bedtime  clonazePAM  Tablet 0.5 milliGRAM(s) Oral two times a day  dextrose 5%. 1000 milliLiter(s) IV Continuous <Continuous>  dextrose 5%. 1000 milliLiter(s) IV Continuous <Continuous>  dextrose 50% Injectable 12.5 Gram(s) IV Push once  dextrose 50% Injectable 25 Gram(s) IV Push once  dextrose 50% Injectable 25 Gram(s) IV Push once  enoxaparin Injectable 40 milliGRAM(s) SubCutaneous every 24 hours  glucagon  Injectable 1 milliGRAM(s) IntraMuscular once  insulin lispro (ADMELOG) corrective regimen sliding scale   SubCutaneous three times a day before meals  insulin lispro (ADMELOG) corrective regimen sliding scale   SubCutaneous at bedtime  mirtazapine 45 milliGRAM(s) Oral at bedtime  oxybutynin 5 milliGRAM(s) Oral two times a day  pantoprazole    Tablet 40 milliGRAM(s) Oral before breakfast  PARoxetine 30 milliGRAM(s) Oral daily  piperacillin/tazobactam IVPB.- 3.375 Gram(s) IV Intermittent once  QUEtiapine 100 milliGRAM(s) Oral at bedtime    MEDICATIONS (PRN):acetaminophen     Tablet .. 650 milliGRAM(s) Oral every 6 hours PRN Temp greater or equal to 38C (100.4F), Mild Pain (1 - 3), Moderate Pain (4 - 6)  dextrose Oral Gel 15 Gram(s) Oral once PRN Blood Glucose LESS THAN 70 milliGRAM(s)/deciliter    --------------------------------------------------------------------------------------------    Vitals:   T(C): 36.7 (08-22-23 @ 12:18), Max: 36.7 (08-21-23 @ 22:59)  HR: 81 (08-22-23 @ 12:18) (65 - 87)  BP: 127/72 (08-22-23 @ 12:18) (116/59 - 140/72)  RR: 18 (08-22-23 @ 12:18) (16 - 20)  SpO2: 95% (08-22-23 @ 12:18) (92% - 98%)  CAPILLARY BLOOD GLUCOSE      POCT Blood Glucose.: 214 mg/dL (22 Aug 2023 17:08)  POCT Blood Glucose.: 156 mg/dL (22 Aug 2023 11:25)  POCT Blood Glucose.: 116 mg/dL (22 Aug 2023 08:11)    CAPILLARY BLOOD GLUCOSE      POCT Blood Glucose.: 214 mg/dL (22 Aug 2023 17:08)  POCT Blood Glucose.: 156 mg/dL (22 Aug 2023 11:25)  POCT Blood Glucose.: 116 mg/dL (22 Aug 2023 08:11)      Height (cm): 157.5 (08-21 @ 22:59)  Weight (kg): 60.8 (08-21 @ 22:59)  BMI (kg/m2): 24.5 (08-21 @ 22:59)  BSA (m2): 1.61 (08-21 @ 22:59)    PHYSICAL EXAM:   General: NAD, Lying in bed comfortably  Neuro: A+Ox3  HEENT: NC/AT, EOMI  Neck: Soft, supple  Cardio: RRR, nml S1/S2  GI/Abd: Soft, NT/ND, no rebound/guarding, no masses palpated. Negative HIDA scan.  --------------------------------------------------------------------------------------------    LABS  CBC (08-22 @ 09:16)                              14.8                           6.47    )----------------(  365        62.7  % Neutrophils, 26.6  % Lymphocytes, ANC: 4.06                                46.5<H>  CBC (08-21 @ 23:50)                              14.5                           6.15    )----------------(  355        58.0  % Neutrophils, 29.6  % Lymphocytes, ANC: 3.57                                44.9      BMP (08-22 @ 09:16)             142     |  104     |  17    		Ca++ --      Ca 10.2               ---------------------------------( 117<H>		Mg 2.0                4.2     |  26      |  0.63  			Ph 3.5     BMP (08-21 @ 23:50)             141     |  104     |  20    		Ca++ --      Ca 10.1               ---------------------------------( 102<H>		Mg --                 4.8     |  23      |  0.62  			Ph --        LFTs (08-22 @ 09:16)      TPro 7.4 / Alb 4.8 / TBili 0.3 / DBili -- / AST 15 / ALT 12 / AlkPhos 32<L>  LFTs (08-21 @ 23:50)      TPro 7.4 / Alb 4.7 / TBili 0.2 / DBili -- / AST 21 / ALT 14 / AlkPhos 32<L>          VBG (08-21 @ 23:23)     7.36 / 52<H> / 44 / 29 / 2.8 / 74.6%     Lactate: 2.1<H>    --------------------------------------------------------------------------------------------    MICROBIOLOGY  Urinalysis (08-22 @ 09:16):     Color:  / Appearance:  / SG:  / pH:  / Gluc: 117<H> / Ketones:  / Bili:  / Urobili:  / Protein : / Nitrites:  / Leuk.Est:  / RBC:  / WBC:  / Sq Epi:  / Non Sq Epi:  / Bacteria          --------------------------------------------------------------------------------------------    IMAGING

## 2023-08-22 NOTE — CONSULT NOTE ADULT - CONSULT REQUESTED DATE/TIME
I spoke to pt and she said she have been feeling tired some.  LE    Normal  blood count, normal liver and kidney function and normal blood sugar.  Normal Vitamin  B12    White cell was elevated at time of lab test.  Has she been feeling ill?  LDL 98  Goal under 70.    a1c 7.3 goal under 7
I will send an abx and ask her to recheck  Cbc 1 week after abx are done. Can do a [facility] near her.
Left message x1
Pt informed and verbalized understandind
22-Aug-2023

## 2023-08-22 NOTE — ED PROVIDER NOTE - ATTENDING CONTRIBUTION TO CARE
I have personally performed a face to face medical and diagnostic evaluation of the patient. I have discussed with and reviewed the Resident's note and agree with the History, ROS, Physical Exam and MDM unless otherwise indicated. A brief summary of my personal evaluation and impression can be found below.    68-year-old female with history of HTN, HLD presents with exertional shortness of breath and some chest pressure.  Patient states that she went to her primary care doctor and was referred to cardiology for stress test, echo.  However EKG showed nonspecific ST-T wave changes, recommended to come to ER for evaluation.   On exam patient's O2 saturation is in the mid 80s on room air.  Unclear whether this is tender to smoking for many years up until 4 months ago.  No lower extremity edema.  Lungs with diminished breath sounds at the bilateral bases.   Shortness of breath could be secondary to cardiac cause versus COPD.  plan for Labs, EKG, CXR, UA/urine culture, DuoNebs ordered.  Plan admission for pulmonology and cardiology evaluation. James Melton, ED Attending

## 2023-08-22 NOTE — H&P ADULT - NSHPPHYSICALEXAM_GEN_ALL_CORE
Vital Signs Last 24 Hrs  T(C): 36.4 (22 Aug 2023 05:46), Max: 36.7 (21 Aug 2023 22:59)  T(F): 97.5 (22 Aug 2023 05:46), Max: 98 (21 Aug 2023 22:59)  HR: 65 (22 Aug 2023 05:46) (65 - 87)  BP: 129/78 (22 Aug 2023 05:46) (116/59 - 140/72)  BP(mean): 76 (22 Aug 2023 02:20) (76 - 76)  RR: 18 (22 Aug 2023 05:46) (16 - 20)  SpO2: 95% (22 Aug 2023 05:46) (92% - 98%)    Parameters below as of 22 Aug 2023 05:46  Patient On (Oxygen Delivery Method): nasal cannula  O2 Flow (L/min): 4      PHYSICAL EXAM:  GENERAL: NAD, well-developed  CHEST/LUNG: Clear to auscultation bilaterally; No wheeze  HEART: Regular rate and rhythm; Normal S1 S2, No murmurs, rubs, or gallops  ABDOMEN: Soft, Nontender, Nondistended; Bowel sounds present; mild RUQ tenderness to palpation, no fluid wave  EXTREMITIES:  2+ Peripheral Pulses, No clubbing, cyanosis, or edema  PSYCH: AAOx3

## 2023-08-22 NOTE — H&P ADULT - ASSESSMENT
Patient is a 68 year old female with 47 year 1PPD smoking history, well controlled HTN, HLD,  DM2, GERD, overactive bladder, and anxiety, who presents with a concern for shortness of breath concerning for COPD exacerbation.

## 2023-08-23 ENCOUNTER — TRANSCRIPTION ENCOUNTER (OUTPATIENT)
Age: 68
End: 2023-08-23

## 2023-08-23 LAB
ALBUMIN SERPL ELPH-MCNC: 4.3 G/DL — SIGNIFICANT CHANGE UP (ref 3.3–5)
ALP SERPL-CCNC: 30 U/L — LOW (ref 40–120)
ALT FLD-CCNC: 13 U/L — SIGNIFICANT CHANGE UP (ref 10–45)
ANION GAP SERPL CALC-SCNC: 14 MMOL/L — SIGNIFICANT CHANGE UP (ref 5–17)
AST SERPL-CCNC: 15 U/L — SIGNIFICANT CHANGE UP (ref 10–40)
BILIRUB SERPL-MCNC: 0.2 MG/DL — SIGNIFICANT CHANGE UP (ref 0.2–1.2)
BUN SERPL-MCNC: 21 MG/DL — SIGNIFICANT CHANGE UP (ref 7–23)
CALCIUM SERPL-MCNC: 9.9 MG/DL — SIGNIFICANT CHANGE UP (ref 8.4–10.5)
CHLORIDE SERPL-SCNC: 102 MMOL/L — SIGNIFICANT CHANGE UP (ref 96–108)
CO2 SERPL-SCNC: 23 MMOL/L — SIGNIFICANT CHANGE UP (ref 22–31)
CREAT SERPL-MCNC: 0.65 MG/DL — SIGNIFICANT CHANGE UP (ref 0.5–1.3)
EGFR: 96 ML/MIN/1.73M2 — SIGNIFICANT CHANGE UP
GLUCOSE BLDC GLUCOMTR-MCNC: 140 MG/DL — HIGH (ref 70–99)
GLUCOSE BLDC GLUCOMTR-MCNC: 143 MG/DL — HIGH (ref 70–99)
GLUCOSE BLDC GLUCOMTR-MCNC: 143 MG/DL — HIGH (ref 70–99)
GLUCOSE BLDC GLUCOMTR-MCNC: 158 MG/DL — HIGH (ref 70–99)
GLUCOSE SERPL-MCNC: 116 MG/DL — HIGH (ref 70–99)
HCT VFR BLD CALC: 41.9 % — SIGNIFICANT CHANGE UP (ref 34.5–45)
HCV AB S/CO SERPL IA: 0.11 S/CO — SIGNIFICANT CHANGE UP (ref 0–0.99)
HCV AB SERPL-IMP: SIGNIFICANT CHANGE UP
HGB BLD-MCNC: 13.5 G/DL — SIGNIFICANT CHANGE UP (ref 11.5–15.5)
LACTATE SERPL-SCNC: 1.2 MMOL/L — SIGNIFICANT CHANGE UP (ref 0.5–2)
MAGNESIUM SERPL-MCNC: 2.1 MG/DL — SIGNIFICANT CHANGE UP (ref 1.6–2.6)
MCHC RBC-ENTMCNC: 30.1 PG — SIGNIFICANT CHANGE UP (ref 27–34)
MCHC RBC-ENTMCNC: 32.2 GM/DL — SIGNIFICANT CHANGE UP (ref 32–36)
MCV RBC AUTO: 93.5 FL — SIGNIFICANT CHANGE UP (ref 80–100)
NRBC # BLD: 0 /100 WBCS — SIGNIFICANT CHANGE UP (ref 0–0)
PHOSPHATE SERPL-MCNC: 3 MG/DL — SIGNIFICANT CHANGE UP (ref 2.5–4.5)
PLATELET # BLD AUTO: 342 K/UL — SIGNIFICANT CHANGE UP (ref 150–400)
POTASSIUM SERPL-MCNC: 4.4 MMOL/L — SIGNIFICANT CHANGE UP (ref 3.5–5.3)
POTASSIUM SERPL-SCNC: 4.4 MMOL/L — SIGNIFICANT CHANGE UP (ref 3.5–5.3)
PROT SERPL-MCNC: 7.1 G/DL — SIGNIFICANT CHANGE UP (ref 6–8.3)
RBC # BLD: 4.48 M/UL — SIGNIFICANT CHANGE UP (ref 3.8–5.2)
RBC # FLD: 13.7 % — SIGNIFICANT CHANGE UP (ref 10.3–14.5)
SODIUM SERPL-SCNC: 139 MMOL/L — SIGNIFICANT CHANGE UP (ref 135–145)
WBC # BLD: 9.14 K/UL — SIGNIFICANT CHANGE UP (ref 3.8–10.5)
WBC # FLD AUTO: 9.14 K/UL — SIGNIFICANT CHANGE UP (ref 3.8–10.5)

## 2023-08-23 PROCEDURE — 78226 HEPATOBILIARY SYSTEM IMAGING: CPT | Mod: 26

## 2023-08-23 PROCEDURE — 99233 SBSQ HOSP IP/OBS HIGH 50: CPT | Mod: GC

## 2023-08-23 PROCEDURE — 74177 CT ABD & PELVIS W/CONTRAST: CPT | Mod: 26

## 2023-08-23 RX ADMIN — PANTOPRAZOLE SODIUM 40 MILLIGRAM(S): 20 TABLET, DELAYED RELEASE ORAL at 05:46

## 2023-08-23 RX ADMIN — Medication 5 MILLIGRAM(S): at 05:46

## 2023-08-23 RX ADMIN — ATORVASTATIN CALCIUM 10 MILLIGRAM(S): 80 TABLET, FILM COATED ORAL at 21:12

## 2023-08-23 RX ADMIN — ENOXAPARIN SODIUM 40 MILLIGRAM(S): 100 INJECTION SUBCUTANEOUS at 13:22

## 2023-08-23 RX ADMIN — QUETIAPINE FUMARATE 100 MILLIGRAM(S): 200 TABLET, FILM COATED ORAL at 21:12

## 2023-08-23 RX ADMIN — Medication 0.5 MILLIGRAM(S): at 17:56

## 2023-08-23 RX ADMIN — Medication 3 MILLILITER(S): at 17:55

## 2023-08-23 RX ADMIN — AZITHROMYCIN 500 MILLIGRAM(S): 500 TABLET, FILM COATED ORAL at 05:41

## 2023-08-23 RX ADMIN — Medication 40 MILLIGRAM(S): at 17:56

## 2023-08-23 RX ADMIN — Medication 30 MILLIGRAM(S): at 13:21

## 2023-08-23 RX ADMIN — Medication 40 MILLIGRAM(S): at 05:46

## 2023-08-23 RX ADMIN — Medication 3 MILLILITER(S): at 13:23

## 2023-08-23 RX ADMIN — Medication 5 MILLIGRAM(S): at 17:56

## 2023-08-23 RX ADMIN — Medication 0.5 MILLIGRAM(S): at 05:45

## 2023-08-23 RX ADMIN — Medication 3 MILLILITER(S): at 05:47

## 2023-08-23 RX ADMIN — MIRTAZAPINE 45 MILLIGRAM(S): 45 TABLET, ORALLY DISINTEGRATING ORAL at 21:12

## 2023-08-23 NOTE — PHYSICAL THERAPY INITIAL EVALUATION ADULT - PERTINENT HX OF CURRENT PROBLEM, REHAB EVAL
68 year old female with 47 year 1PPD smoking history, well controlled HTN, HLD,  DM2, GERD, overactive bladder, and anxiety, who presents with a concern for shortness of breath. The patient is hemodynamically stable afebrile, non-tachycardic, and normotensive. Physical exam of the abdomen is soft, non-tender,distended with negative Powell's sign. Laboratory values are WBCs 6.47k and normal LFTs with elevated lactate levels (2.1). Ultrasound shows a 1.0 cm stone in the gallbladder neck with mural thickening and positive sonographic Powell's sign. Although the imaging findings suggest acute cholecystitis, the patient's laboratory values normal WBCs and LFTs and benign abdominal exam makes biliary colic more likely.  CXR clear lungs. CT chest Asymmetric right apical opacity/suspected scarring and irregular 5 mm left apical nodule on a background of emphysema. In the absence of prior imaging, follow-up chest CT in 3-6 months to determine stability.

## 2023-08-23 NOTE — DISCHARGE NOTE PROVIDER - CARE PROVIDER_API CALL
Gigi Iglesias Dicsherman  Surgery  [address] [place] [id number]  Phone: [phone]  Fax: [phone]  Follow Up Time: 2 weeks

## 2023-08-23 NOTE — CHART NOTE - NSCHARTNOTEFT_GEN_A_CORE
Images and results of HIDA reviewed - no evidence of acute cholecystitis.     - No plan for acute surgical intervention   - May follow-up with Dr. Iglesias as outpatient for elective cholecystectomy, information added to discharge summary  - Please re-page as needed     ACS/Trauma Surgery  [id number]        < from: [facility]at[facility] (08.23.23 @ 11:56) >    FINDINGS: There is prompt, homogeneous uptake of radiopharmaceutical by   the hepatocytes. Activity is seenin the gallbladder at approximately 60   minutes and in the bowel at approximately 30 minutes. There is good   clearance of activity from the liver by the end of the study.    IMPRESSION: No evidence of acute cholecystitis or biliary obstruction.    < end of copied text >

## 2023-08-23 NOTE — PROGRESS NOTE ADULT - SUBJECTIVE AND OBJECTIVE BOX
Patient is a 68y old  Female who presents with a chief complaint of shortness of breath (22 Aug 2023 17:55)    SUBJECTIVE / OVERNIGHT EVENTS:  Patient seen and evaluated at bedside.    Denies any fevers, chills, CP, or SOB.    Vital Signs Last 24 Hrs  T(C): 36.6 (23 Aug 2023 04:17), Max: 36.8 (22 Aug 2023 18:31)  T(F): 97.8 (23 Aug 2023 04:17), Max: 98.3 (22 Aug 2023 21:41)  HR: 91 (23 Aug 2023 04:17) (81 - 91)  BP: 114/70 (23 Aug 2023 04:17) (114/70 - 145/76)  BP(mean): --  RR: 18 (23 Aug 2023 04:17) (18 - 18)  SpO2: 94% (23 Aug 2023 04:17) (92% - 95%)    Parameters below as of 23 Aug 2023 04:17  Patient On (Oxygen Delivery Method): nasal cannula  O2 Flow (L/min): 4      PHYSICAL EXAM:  GENERAL: NAD, well-developed  CHEST/LUNG: Clear to auscultation bilaterally; No wheeze  HEART: Regular rate and rhythm; Normal S1 S2, No murmurs, rubs, or gallops  ABDOMEN: Soft, Nontender, Nondistended; Bowel sounds present  EXTREMITIES:  2+ Peripheral Pulses, No clubbing, cyanosis, or edema  PSYCH: AAOx3    LABS:                        14.8   6.47  )-----------( 365      ( 22 Aug 2023 09:16 )             46.5     Hgb Trend: 14.8<--, 14.5<--  08-22    142  |  104  |  17  ----------------------------<  117<H>  4.2   |  26  |  0.63    Ca    10.2      22 Aug 2023 09:16  Phos  3.5     08-22  Mg     2.0     08-22    TPro  7.4  /  Alb  4.8  /  TBili  0.3  /  DBili  x   /  AST  15  /  ALT  12  /  AlkPhos  32<L>  08-22    Creatinine Trend: 0.63<--, 0.62<--  LIVER FUNCTIONS - ( 22 Aug 2023 09:16 )  Alb: 4.8 g/dL / Pro: 7.4 g/dL / ALK PHOS: 32 U/L / ALT: 12 U/L / AST: 15 U/L / GGT: x                 Urinalysis Basic - ( 22 Aug 2023 09:16 )    Color: x / Appearance: x / SG: x / pH: x  Gluc: 117 mg/dL / Ketone: x  / Bili: x / Urobili: x   Blood: x / Protein: x / Nitrite: x   Leuk Esterase: x / RBC: x / WBC x   Sq Epi: x / Non Sq Epi: x / Bacteria: x     Patient is a 68y old  Female who presents with a chief complaint of shortness of breath (22 Aug 2023 17:55)    SUBJECTIVE / OVERNIGHT EVENTS:  Patient seen and evaluated at bedside.    Denies any fevers, chills, CP, or SOB.    Vital Signs Last 24 Hrs  T(C): 36.6 (23 Aug 2023 04:17), Max: 36.8 (22 Aug 2023 18:31)  T(F): 97.8 (23 Aug 2023 04:17), Max: 98.3 (22 Aug 2023 21:41)  HR: 91 (23 Aug 2023 04:17) (81 - 91)  BP: 114/70 (23 Aug 2023 04:17) (114/70 - 145/76)  BP(mean): --  RR: 18 (23 Aug 2023 04:17) (18 - 18)  SpO2: 94% (23 Aug 2023 04:17) (92% - 95%)    Parameters below as of 23 Aug 2023 04:17  Patient On (Oxygen Delivery Method): nasal cannula  O2 Flow (L/min): 4      PHYSICAL EXAM:  GENERAL: NAD, well-developed  CHEST/LUNG: Clear to auscultation bilaterally; No wheeze  HEART: Regular rate and rhythm; Normal S1 S2, No murmurs, rubs, or gallops  ABDOMEN: Soft, RUQ ttp, Nondistended; Bowel sounds present  EXTREMITIES:  2+ Peripheral Pulses, No clubbing, cyanosis, or edema  PSYCH: AAOx3    LABS:                        14.8   6.47  )-----------( 365      ( 22 Aug 2023 09:16 )             46.5     Hgb Trend: 14.8<--, 14.5<--  08-22    142  |  104  |  17  ----------------------------<  117<H>  4.2   |  26  |  0.63    Ca    10.2      22 Aug 2023 09:16  Phos  3.5     08-22  Mg     2.0     08-22    TPro  7.4  /  Alb  4.8  /  TBili  0.3  /  DBili  x   /  AST  15  /  ALT  12  /  AlkPhos  32<L>  08-22    Creatinine Trend: 0.63<--, 0.62<--  LIVER FUNCTIONS - ( 22 Aug 2023 09:16 )  Alb: 4.8 g/dL / Pro: 7.4 g/dL / ALK PHOS: 32 U/L / ALT: 12 U/L / AST: 15 U/L / GGT: x                 Urinalysis Basic - ( 22 Aug 2023 09:16 )    Color: x / Appearance: x / SG: x / pH: x  Gluc: 117 mg/dL / Ketone: x  / Bili: x / Urobili: x   Blood: x / Protein: x / Nitrite: x   Leuk Esterase: x / RBC: x / WBC x   Sq Epi: x / Non Sq Epi: x / Bacteria: x

## 2023-08-23 NOTE — PHYSICAL THERAPY INITIAL EVALUATION ADULT - STANDING BALANCE: DYNAMIC, REHAB EVAL
Discharge Summary    Patient ID:  Aicha Choi  [id number]  62 year old  1957    Admit date: 12/15/2019    Discharge date:  12/27/2019      Admitting Physician: Didi Germain MD     Discharge Physician: Socorro Tavera MD    Primary Diagnoses:   Principal Problem:    Encephalopathy  Active Problems:    Acute appendicitis    HTN (hypertension)    ARF (acute renal failure) (CMS/HCC)    CKD (chronic kidney disease)    Hyponatremia    Pulmonary HTN (CMS/HCC)    Obese    INOCENTE (obstructive sleep apnea)    Sepsis (CMS/HCC)    Urinary tract infection    Elevated troponin    Dialysis disequilibrium syndrome  Resolved Problems:    Hyperkalemia      Secondary Diagnoses:  Past Medical History:   Diagnosis Date   • Chronic heart failure (CMS/[facility])     Dr. Balderas, Normal EF 2013   • Chronic major depressive disorder    • Chronic respiratory failure (CMS/HCC)     Dr. An   • CKD (chronic kidney disease), stage III (CMS/HCC)     Dr. Broussard   • COPD (chronic obstructive pulmonary disease) (CMS/HCC)    • Essential (primary) hypertension    • Hyperlipidemia    • Morbid obesity with BMI of 45.0-49.9, adult (CMS/HCC)     Losing weight, used to weight about 350 lbs   • Obesity hypoventilation syndrome (CMS/HCC)    • INOCENTE on CPAP    • Pulmonary HTN (CMS/HCC) 2013    PA pressures were about 63 mmHg on echo 2013   • Type 2 diabetes mellitus with hyperglycemia, with long-term current use of insulin (CMS/HCC)    • Umbilical hernia      Admission HPI:   Aicha Choi is a 62 year old female who presents with RLQ abdominal pain.  She presented at [facility] and transfer was requested by the surgeon there.       She was in [facility] ED two days ago. She returned today.  Today workup included CT showing acute appendicitis and lab work showing leukocytosis. She reports she has been feeling ill for about a week.  Today at her evaluation there CT was done, leukocytosis noted. She reports pain is focal in her right  lower abdomen.  She feels bloated.  She is alert, tachycardic, non labored breathing, not toxic appearing.      She has chronic medical issues.  I reviewed her chart and updated her PMH.     Hospital Course By Problem List (see H&P for details of admission):    Severe sepsis, POA, now resolved   Perforated appendix   - S/p Lap Appendectomy 12/15/19   - finished Abx   - No further surgical intervention warranted    Leukocytosis   - WBC trend last 3 days 16--14.5--18, was evaluated by surgery who felt that Abdomen is soft and no further surgical intervention is warranted   - patient is afebrile, does not appear septic   - I have removed her Rt IJ temp dialysis cathter today which may be the culprit for Leukocytosis, site looks clean.     SAHIL on CKD III  - SAHIL 2/2 ATN from severe sepsis   - initially was on CRRT then conventional HD via temp HD catheter for oliguric SAHIL.  - Kidney function has recovered and no longer requiring RRT   - UOP 1450 ml last 24 hours   - Temp dialysis catheter removed today      Choledocholithiasis   - ERCP on Monday, patient wants this done in outpatient setting.  - Called GI on call Dr Dudley who is aware of D/c plan today.     Acute on chronic respiratory failure with Hypoxia   Severe Pulmonary HTN   COPD  INOCENTE   - resume Bumex 1 mg daily   - supplemental O2 , Duonebs and inhaled steroids ( will prescribe Symbicort on discharge)   - outpatient follow up with Cardiology/specialized PHTN clinic      Metabolic Encephalopathy, resolved  - concern for seizure activity Vs dialysis Disequilibrium syndrome   - cont Keppra and follow up with neurology as outpatient      DM II with renal manifestations  - Cont Lantus 15 units BID  and short acting Insulin   - Hg A1c 6.7   - goal pre-meal --180      Essential HTN  - On Diltiazem with fair BP control and will resume Bumex   - Cont to hold Lisinopril          Consults   IP Consult Orders (From admission, onward)     Start     Ordered    12/19/19  1130  Inpatient consult to GI  ONE TIME     Provider:  Fredis Reyes MD    12/19/19 1129    12/17/19 1403  Inpatient consult to Nephrology  ONE TIME     Provider:  Randa Gan MD    12/17/19 1403                Procedures performed Ct Abdomen Pelvis Wo Contrast    Result Date: 12/15/2019  INTERPRETATION LOCATION: [facility] EXAM: CT ABDOMEN PELVIS WO CONTRAST  at 12/15/2019 13:47 COMPARISON: None HISTORY:Female 62 years with  abdominal pain TECHNIQUE:3 mm slices from the top of liver through the pubic symphysis without contrast. Sagittal and coronal reconstructions. Individualized dose optimization technique was used for the procedure performed. FINDINGS: Abdomen: Cecum is flipped into the right mid abdomen. There are severe inflammatory changes surrounding the appendix. There is an appendicolith identified. There are small bubbles of extraluminal gas identified with collateral inflammatory changes of the terminal ileum and extending to the right lower quadrant abdominal wall. There are no drainable collections. There is no free air. There is no stone disease. No hydronephrosis. Gallbladder surgically absent. Liver, spleen, and pancreas are clear in this noncontrast study. Pelvis: Uterus and ovaries are within normal limits. Bladder is incompletely distended. No free fluid or drainable collections.     IMPRESSION: 1.  Severe acute appendicitis with contained perforation and severe collateral inflammatory changes throughout the right lower quadrant. Signed by: Kingsley Goel       Discharge Exam    Blood pressure 150/67, pulse 84, temperature 97.8 °F (36.6 °C), temperature source Oral, resp. rate 16, height 4' 9\" (1.448 m), weight 96.6 kg, SpO2 97 %.    General: A&O x3, in NAD   Lungs: Diminished   Heart: RRR, no G/R/M    Activity: as tolerated   Diet: Low salt, low cholesterol     Code Status: Full Resuscitation    Pending issues to be followed up by PCP    Local Neurology follow up for possible seizure  disorder  ERCP Monday as outpatient procedures.    Discharge Medications     Summary of your Discharge Medications      Take these Medications      Details   ADMELOG 100 UNIT/ML injectable solution   Generic drug:  insulin lispro  INJECT 10 UNITS WITH EACH MEAL AND AN ADDITIONAL 4 UNITS FOR 30MG/ OVER 150MG MAX 25 UNITS TID     * albuterol (2.5 MG/3ML) 0.083% nebulizer solution  Commonly known as:  VENTOLIN   Take 2.5 mg by nebulization every 4 hours as needed for Wheezing.     * albuterol 108 (90 Base) MCG/ACT inhaler   Inhale 2 puffs into the lungs every 4 hours as needed for Shortness of Breath or Wheezing.     allopurinol 100 MG tablet  Commonly known as:  ZYLOPRIM   Take 1 tablet by mouth daily.     atorvastatin 80 MG tablet  Commonly known as:  LIPITOR   Take 80 mg by mouth daily.     budesonide-formoterol 80-4.5 MCG/ACT inhaler  Commonly known as:  SYMBICORT   Inhale 2 puffs into the lungs 2 times daily.     bumetanide 1 MG tablet  Commonly known as:  BUMEX   Take 1 tablet by mouth daily.     cholecalciferol 25 mcg (1,000 units) tablet  Commonly known as:  VITAMIN D   Take 1 tablet by mouth daily.     dilTIAZem 60 MG tablet  Commonly known as:  CARDIZEM   Take 60 mg by mouth 2 times daily.     ferrous sulfate 325 (65 FE) MG tablet   Take 1 tablet by mouth daily (with breakfast).     FLONASE 50 MCG/ACT nasal spray   Generic drug:  fluticasone  Spray 1 spray in each nostril 2 times daily.     insulin glargine 100 UNIT/ML pen-injector  Commonly known as:  BASAGLAR KWIKPEN   15 units sub q bid     isosorbide mononitrate 30 MG 24 hr tablet  Commonly known as:  IMDUR   TAKE 1 TABLET BY MOUTH DAILY     levetiracetam 1000 MG tablet  Commonly known as:  KEPPRA   Take 1 tablet by mouth every 12 hours.     loratadine 10 MG tablet  Commonly known as:  CLARITIN   Take 10 mg by mouth daily.     sertraline 100 MG tablet  Commonly known as:  ZOLOFT   Take 100 mg by mouth 2 times daily.     TYLENOL 500 MG tablet   Generic  drug:  acetaminophen  Take 1,000 mg by mouth every 6 hours as needed for Pain.     vitamin - therapeutic multivitamins w/minerals tablet   Take 1 tablet by mouth daily.         * This list has 2 medication(s) that are the same as other medications prescribed for you. Read the directions carefully, and ask your doctor or other care provider to review them with you.                Follow-up with:    ELAINE Cervantes  [address] [facility] [address] [phone]    In 7 days  Hospital Follow up    Didi Germain MD  [address]  [facility] [address]  [phone]    Schedule an appointment as soon as possible for a visit  As needed    Fredis Reyes MD  [address] [facility] [address]  [phone]    On 12/30/2019  appointment for ERCP, be there at 1300       Time spent on discharge was greater than 30 minutes    Signed:    Socorro Tavera MD  12/27/2019  9:50 AM     good balance

## 2023-08-23 NOTE — DISCHARGE NOTE PROVIDER - NSDCCPTREATMENT_GEN_ALL_CORE_FT
PRINCIPAL PROCEDURE  Procedure: US abdomen RUQ  Findings and Treatment: IMPRESSION:  A 1.0 cm stone in the gallbladder neck with mural thickening and positive   sonographic Powell's sign. Findings are suspicious for acute   cholecystitis. No pericholecystic fluid or collection is seen.      SECONDARY PROCEDURE  Procedure: HIDA scan  Findings and Treatment: FINDINGS: There is prompt, homogeneous uptake of radiopharmaceutical by   the hepatocytes. Activity is seen in the gallbladder at approximately 60   minutes and in the bowel at approximately 30 minutes. There is good   clearance of activity from the liver by the end of the study.  IMPRESSION: No evidence of acute cholecystitis or biliary obstruction.    Procedure: CT abdomen pelvis  Findings and Treatment: LOWER CHEST: Emphysema.  LIVER: Subcentimeter right hepatic lobe hypodensity, seen as a cyst with   through transmission on prior sonogram.  BILE DUCTS: Normal caliber.  GALLBLADDER: Contracted. Cholelithiasis. Mild wall thickening of the   fundus, nonspecific.  SPLEEN: Within normal limits.  PANCREAS: Within normal limits.  ADRENALS: Within normal limits.  KIDNEYS/URETERS: No hydronephrosis. Subcentimeter hypodense foci   bilaterally are too small to characterize.  BLADDER: Minimally distended.  REPRODUCTIVE ORGANS: Limited evaluation due to metallic streak artifact.   Grossly within normal limits.  BOWEL: No bowel obstruction. Appendix is normal.  PERITONEUM: No ascites.  VESSELS: Atherosclerotic changes.  RETROPERITONEUM/LYMPH NODES: No lymphadenopathy.  ABDOMINAL WALL: Small fat-containing umbilical hernia.  BONES: Degenerative changes. Status post right hip arthroplasty.  IMPRESSION:  No CT evidence of intra-abdominal malignancy.

## 2023-08-23 NOTE — DISCHARGE NOTE PROVIDER - NSDCMRMEDTOKEN_GEN_ALL_CORE_FT
clonazePAM 0.5 mg oral tablet: 1 orally 2 times a day  fenofibrate 160 mg oral tablet: 1 orally once a day  fesoterodine 4 mg oral tablet, extended release: 1 orally once a day  metFORMIN 500 mg oral tablet: 1 orally 2 times a day  mirtazapine 45 mg oral tablet: 1 orally once a day (at bedtime)  Myrbetriq 25 mg oral tablet, extended release: 1 orally once a day  omeprazole 40 mg oral delayed release capsule: 1 orally once a day  PARoxetine 30 mg oral tablet: 1 orally once a day  pravastatin 20 mg oral tablet: 1 orally once a day  QUEtiapine 100 mg oral tablet: 1 orally once a day   Albuterol (Eqv-ProAir HFA) 90 mcg/inh inhalation aerosol: 2 puff(s) inhaled every 6 hours as needed for  shortness of breath and/or wheezing  clonazePAM 0.5 mg oral tablet: 1 orally 2 times a day  fenofibrate 160 mg oral tablet: 1 orally once a day  fesoterodine 4 mg oral tablet, extended release: 1 orally once a day  fluticasone 100 mcg/inh inhalation powder: 1 puff(s) inhaled once a day  metFORMIN 500 mg oral tablet: 1 orally 2 times a day  mirtazapine 45 mg oral tablet: 1 orally once a day (at bedtime)  Myrbetriq 25 mg oral tablet, extended release: 1 orally once a day  omeprazole 40 mg oral delayed release capsule: 1 orally once a day  PARoxetine 30 mg oral tablet: 1 orally once a day  pravastatin 20 mg oral tablet: 1 orally once a day  QUEtiapine 100 mg oral tablet: 1 orally once a day  Spiriva Respimat 1.25 mcg/inh inhalation aerosol: 2 puff(s) inhaled once a day   Albuterol (Eqv-ProAir HFA) 90 mcg/inh inhalation aerosol: 2 puff(s) inhaled every 6 hours as needed for  shortness of breath and/or wheezing  clonazePAM 0.5 mg oral tablet: 1 orally 2 times a day  fesoterodine 4 mg oral tablet, extended release: 1 orally once a day  fluticasone 100 mcg/inh inhalation powder: 1 puff(s) inhaled once a day  metFORMIN 500 mg oral tablet: 1 orally 2 times a day  mirtazapine 45 mg oral tablet: 1 orally once a day (at bedtime)  Myrbetriq 25 mg oral tablet, extended release: 1 orally once a day  omeprazole 40 mg oral delayed release capsule: 1 orally once a day  oxyBUTYnin 5 mg oral tablet: 1 tab(s) orally 2 times a day  PARoxetine 30 mg oral tablet: 1 orally once a day  pravastatin 20 mg oral tablet: 1 orally once a day  predniSONE 20 mg oral tablet: 2 tab(s) orally once a day start on 8/25  QUEtiapine 100 mg oral tablet: 1 orally once a day  Spiriva Respimat 1.25 mcg/inh inhalation aerosol: 2 puff(s) inhaled once a day

## 2023-08-23 NOTE — DISCHARGE NOTE PROVIDER - NSDCCPCAREPLAN_GEN_ALL_CORE_FT
PRINCIPAL DISCHARGE DIAGNOSIS  Diagnosis: Shortness of breath  Assessment and Plan of Treatment:       SECONDARY DISCHARGE DIAGNOSES  Diagnosis: Gall bladder stones  Assessment and Plan of Treatment:      PRINCIPAL DISCHARGE DIAGNOSIS  Diagnosis: Shortness of breath  Assessment and Plan of Treatment: You came to the hospital because you had trouble breathing. You had a chest x ray which was unremarkable, and a CT scan which showed an irregular 5 mm left apical nodul as well as emphysema of your lungs.   You were given oxygen, antibiotics, bronchodilators, and steroids to help improve your breathing.   In the absence of prior imaging, please follow-up chest CT in 3-6 months and please follow up outpatient with your primary care doctor.      SECONDARY DISCHARGE DIAGNOSES  Diagnosis: Gall bladder stones  Assessment and Plan of Treatment: You came in with some pain in your right upper abdomen. An ultrasound showed a stone in the neck of the gallbladder. You had a nuclear imaging scan which showed X. You did not have fevers, high liver enzymes, or a white count, so surgery recommended low fat diet.     PRINCIPAL DISCHARGE DIAGNOSIS  Diagnosis: COPD (chronic obstructive pulmonary disease) with emphysema  Assessment and Plan of Treatment: You came to the hospital because you had trouble breathing. You had a chest x ray which was unremarkable, and a CT scan which showed an irregular 5 mm left apical nodule as well as emphysema of your lungs.   You were given oxygen, antibiotics, bronchodilators, and steroids to help improve your breathing.  You had an echo which showed normal heart function.   In the absence of prior imaging, please follow-up chest CT in 3-6 months and please follow up outpatient with your primary care doctor. You were also discharged home on continous and portable oxygen, as well as some medications to help reduce inflammation.   You should follow up with a pulmonologist outpatient. If you have increased difficulty breathing, please call your doctor.      SECONDARY DISCHARGE DIAGNOSES  Diagnosis: Gall bladder stones  Assessment and Plan of Treatment: You came in with some pain in your right upper abdomen. An ultrasound showed a stone in the neck of the gallbladder. You had a nuclear imaging scan which showed no evidence of acute cholecystitis. You also had a CT scan that did not show anything. You did not have fevers, high liver enzymes, or a white count, so surgery recommended low fat diet.  You have a follow up appointment with a general surgeon to schedule a cholecystectomy whenever you feel better. If you experience severe abdominal pain please call your doctor.    Diagnosis: Hyperlipidemia  Assessment and Plan of Treatment: You have a history of hyperlipidemia; you were restarted on home meds.    Diagnosis: Type 2 diabetes mellitus  Assessment and Plan of Treatment: You have a history of Type 2 diabetes; you were restarted on home meds.    Diagnosis: Hypertension  Assessment and Plan of Treatment: You have a history of hypertension; you were restarted on home meds.    Diagnosis: Overactive bladder  Assessment and Plan of Treatment: You have a history of overactive bladder; you were restarted on home meds.    Diagnosis: Anxiety  Assessment and Plan of Treatment: You have a history of anxiety; you were restarted on home meds.     PRINCIPAL DISCHARGE DIAGNOSIS  Diagnosis: COPD (chronic obstructive pulmonary disease) with emphysema  Assessment and Plan of Treatment: You came to the hospital because you had trouble breathing. You had a chest x ray which was unremarkable, and a CT scan which showed an irregular 5 mm left apical nodule as well as emphysema of your lungs.   You were given oxygen, antibiotics, bronchodilators, and steroids to help improve your breathing.  You had an echo which showed normal heart function.   In the absence of prior imaging, please follow-up chest CT in 3-6 months and please follow up outpatient with your primary care doctor. You were also discharged home on continous and portable oxygen, as well as some medications to help reduce inflammation and to help you breath. Please use these medications as instructed.   You should follow up with a pulmonologist outpatient. If you have increased difficulty breathing, please call seek medical attention.      SECONDARY DISCHARGE DIAGNOSES  Diagnosis: Gall bladder stones  Assessment and Plan of Treatment: You came in with some pain in your right upper abdomen. An ultrasound showed a stone in the neck of the gallbladder. You had a nuclear imaging scan which showed no evidence of acute cholecystitis. You also had a CT scan that did not show anything. You did not have fevers, high liver enzymes, or a white count, so surgery recommended low fat diet.  You have a follow up appointment with a general surgeon to schedule a cholecystectomy whenever you feel better. If you experience severe abdominal pain please call your doctor.    Diagnosis: Hyperlipidemia  Assessment and Plan of Treatment: You have a history of hyperlipidemia; you were restarted on home meds except your fenofibrate. Please do not take this medications. Please follow up with your PCP for further managment. .

## 2023-08-23 NOTE — DISCHARGE NOTE PROVIDER - NSFOLLOWUPCLINICS_GEN_ALL_ED_FT
[facility]  Pulmonology  [address] [place] [address], [place] [address]  Phone: [phone]  Fax:   Follow Up Time: 2 weeks

## 2023-08-23 NOTE — DISCHARGE NOTE PROVIDER - HOSPITAL COURSE
Patient is a 68 year old female with 47 year 1PPD smoking history, well controlled HTN, HLD,  DM2, GERD, overactive bladder, and anxiety, who presented with a concern for shortness of breath concerning for COPD exacerbation on 8/22. On 8/22, CXR  and ABG was unremarkable, Echo was WNL. CT chest showed diffuse emphysema and an irregular R apical lung nodule. She was started on 4l NC and weaned to 2L, started on azithromycin, duonebs q6, and a steroid taper.   Patient also noted that she had some abdominal pain, and RUQ US showed a stone in gall bladder neck, and surgery was consulted. 1 dose of Zosyn was given on 8/22. HIDA scan on 8/23 showed X.   All home meds were restarted.    Patient has no oxygen requirements, and is medically stable for discharge.     Patient is a 68 year old female with 47 year 1PPD smoking history, well controlled HTN, HLD,  DM2, GERD, overactive bladder, and anxiety, who presented with a concern for shortness of breath concerning for COPD exacerbation on 8/22. On 8/22, CXR  and ABG was unremarkable, Echo was WNL. CT chest showed diffuse emphysema and an irregular R apical lung nodule. She was started on 4l NC and weaned to 2L, started on azithromycin, duonebs q6, and a steroid taper.   Patient also noted that she had some abdominal pain, and RUQ US showed a stone in gall bladder neck, and surgery was consulted. 1 dose of Zosyn was given on 8/22. HIDA scan on 8/23 showed no evidence of acute cholecystitis. CTAP with IV contrast showed a Subcentimeter right hepatic lobe hypodensity, seen as a cyst with through transmission on prior sonogram and a Small fat-containing umbilical hernia.  Patient was saturating at 80% on room air at rest. She was started on continuous and portable oxygen for home.     All home meds were restarted.    Patient is medically stable for discharge.     Patient is a 68 year old female with 47 year 1PPD smoking history, well controlled HTN, HLD,  DM2, GERD, overactive bladder, and anxiety, who presented with a concern for shortness of breath concerning for COPD exacerbation on 8/22. On 8/22, CXR  and ABG was unremarkable, Echo was WNL. CT chest showed diffuse emphysema and an irregular R apical lung nodule. She was started on 4l NC and weaned to 2L, started on azithromycin, duonebs q6, and a steroid taper.   Patient also noted that she had some abdominal pain, and RUQ US showed a stone in gall bladder neck, and surgery was consulted. 1 dose of Zosyn was given on 8/22. HIDA scan on 8/23 showed no evidence of acute cholecystitis. CTAP with IV contrast showed a Subcentimeter right hepatic lobe hypodensity, seen as a cyst with through transmission on prior sonogram and a Small fat-containing umbilical hernia. Surgery evaluated the patient and recommended outpatient follow up for elective cholecystectomy. Fibrate medication was held on discharge.   Patient was saturating at 80% on room air at rest. Her respiratory status improved and she was able to be weaned to 2 L NC. She will be discharged on continuous and portable oxygen for home.     Upon discharge the patient was hemodynamically stable and medically optimized.   The patient will follow up with her PCP, general surgery, and pulmonology upon discharge.     Patient is a 68 year old female with 47 year 1PPD smoking history, well controlled HTN, HLD,  DM2, GERD, overactive bladder, and anxiety, who presented with a concern for shortness of breath concerning for COPD exacerbation on 8/22. On 8/22, CXR  and ABG was unremarkable, Echo was WNL. CT chest showed diffuse emphysema and an irregular R apical lung nodule. She was started on 4l NC and weaned to 2L, started on azithromycin, duonebs q6, and a steroid taper.     Patient also noted that she had some abdominal pain, and RUQ US showed a stone in gall bladder neck, and surgery was consulted. 1 dose of Zosyn was given on 8/22. HIDA scan on 8/23 showed no evidence of acute cholecystitis. CTAP with IV contrast showed a Subcentimeter right hepatic lobe hypodensity, seen as a cyst with through transmission on prior sonogram and a Small fat-containing umbilical hernia. Surgery evaluated the patient and recommended outpatient follow up for elective cholecystectomy. Fibrate medication was held on discharge.   CT Chest showed emphysema an irregular 5 mm left apical nodule.   Patient was saturating at 80% on room air at rest. Her respiratory status improved and she was able to be weaned to 2 L NC. She will be discharged on continuous and portable oxygen for home.     Upon discharge the patient was hemodynamically stable and medically optimized.   The patient will follow up with her PCP, general surgery, and pulmonology upon discharge.

## 2023-08-23 NOTE — PHYSICAL THERAPY INITIAL EVALUATION ADULT - ADDITIONAL COMMENTS
Pt lives in an apartment in private home with daughter, there are 4+5 steps to enter, one floor set up.  Pt performed ADL/IADLs independently. Ambulates with no assistive device.

## 2023-08-23 NOTE — PROGRESS NOTE ADULT - SUBJECTIVE AND OBJECTIVE BOX
SURGERY PROGRESS NOTE    SUBJECTIVE / 24H EVENTS:  Patient seen and examined on morning rounds. No acute events overnight.      OBJECTIVE:  VITAL SIGNS:  T(C): 36.6 (23 @ 04:17), Max: 36.8 (23 @ 18:31)  HR: 93 (23 @ 09:41) (81 - 93)  BP: 111/72 (23 @ 09:41) (111/72 - 145/76)  RR: 18 (23 @ 09:41) (18 - 18)  SpO2: 93% (23 @ 09:41) (92% - 95%)  Daily     Daily Weight in k.2 (23 Aug 2023 04:17)  POCT Blood Glucose.: 140 mg/dL (23 @ 08:49)  POCT Blood Glucose.: 137 mg/dL (23 @ 21:35)  POCT Blood Glucose.: 214 mg/dL (23 @ 17:08)      PHYSICAL EXAM:  Gen: NAD  LS: Respirations unlabored   GI: Soft. Nontender. Nondistended  Ext: Warm, well perfused      23 @ 07:01  -  23 @ 07:00  --------------------------------------------------------  IN:  Total IN: 0 mL    OUT:    Voided (mL): 200 mL  Total OUT: 200 mL    Total NET: -200 mL          LAB VALUES:      139  |  102  |  21  ----------------------------<  116<H>  4.4   |  23  |  0.65    Ca    9.9      23 Aug 2023 07:25  Phos  3.0       Mg     2.1         TPro  7.1  /  Alb  4.3  /  TBili  0.2  /  DBili  x   /  AST  15  /  ALT  13  /  AlkPhos  30<L>                                 13.5   9.14  )-----------( 342      ( 23 Aug 2023 07:24 )             41.9     LIVER FUNCTIONS - ( 23 Aug 2023 07:25 )  Alb: 4.3 g/dL / Pro: 7.1 g/dL / ALK PHOS: 30 U/L / ALT: 13 U/L / AST: 15 U/L / GGT: x                   Urinalysis Basic - ( 23 Aug 2023 07:25 )    Color: x / Appearance: x / SG: x / pH: x  Gluc: 116 mg/dL / Ketone: x  / Bili: x / Urobili: x   Blood: x / Protein: x / Nitrite: x   Leuk Esterase: x / RBC: x / WBC x   Sq Epi: x / Non Sq Epi: x / Bacteria: x        MICROBIOLOGY:      RADIOLOGY:  PACS Image: Image(s) Available (23 @ 16:00)        MEDICATIONS  (STANDING):  albuterol/ipratropium for Nebulization 3 milliLiter(s) Nebulizer every 6 hours  atorvastatin 10 milliGRAM(s) Oral at bedtime  azithromycin   Tablet 500 milliGRAM(s) Oral every 24 hours  clonazePAM  Tablet 0.5 milliGRAM(s) Oral two times a day  dextrose 5%. 1000 milliLiter(s) (50 mL/Hr) IV Continuous <Continuous>  dextrose 5%. 1000 milliLiter(s) (100 mL/Hr) IV Continuous <Continuous>  dextrose 50% Injectable 25 Gram(s) IV Push once  dextrose 50% Injectable 12.5 Gram(s) IV Push once  dextrose 50% Injectable 25 Gram(s) IV Push once  enoxaparin Injectable 40 milliGRAM(s) SubCutaneous every 24 hours  glucagon  Injectable 1 milliGRAM(s) IntraMuscular once  insulin lispro (ADMELOG) corrective regimen sliding scale   SubCutaneous three times a day before meals  insulin lispro (ADMELOG) corrective regimen sliding scale   SubCutaneous at bedtime  mirtazapine 45 milliGRAM(s) Oral at bedtime  oxybutynin 5 milliGRAM(s) Oral two times a day  pantoprazole    Tablet 40 milliGRAM(s) Oral before breakfast  PARoxetine 30 milliGRAM(s) Oral daily  predniSONE   Tablet 40 milliGRAM(s) Oral two times a day  QUEtiapine 100 milliGRAM(s) Oral at bedtime    MEDICATIONS  (PRN):  acetaminophen     Tablet .. 650 milliGRAM(s) Oral every 6 hours PRN Temp greater or equal to 38C (100.4F), Mild Pain (1 - 3), Moderate Pain (4 - 6)  dextrose Oral Gel 15 Gram(s) Oral once PRN Blood Glucose LESS THAN 70 milliGRAM(s)/deciliter

## 2023-08-23 NOTE — DISCHARGE NOTE PROVIDER - PROVIDER TOKENS
PROVIDER:[TOKEN:[[id number]],FOLLOWUP:[2 weeks]] Opzelura Counseling:  I discussed with the patient the risks of Opzelura including but not limited to nasopharngitis, bronchitis, ear infection, eosinophila, hives, diarrhea, folliculitis, tonsillitis, and rhinorrhea.  Taken orally, this medication has been linked to serious infections; higher rate of mortality; malignancy and lymphoproliferative disorders; major adverse cardiovascular events; thrombosis; thrombocytopenia, anemia, and neutropenia; and lipid elevations.

## 2023-08-23 NOTE — PROGRESS NOTE ADULT - PROBLEM SELECTOR PLAN 2
Patient has a history of HLD, on fenofibrate and pravastatin at home  PLAN  - lipid panel f/u  - continue home meds Patient has a history of HLD, on fenofibrate and pravastatin at home  PLAN  - continue home meds of pravastatin as fenofibrate can lead to gallstones

## 2023-08-23 NOTE — PROGRESS NOTE ADULT - ASSESSMENT
ASSESSMENT/PLAN: 68y F 47 pack year smoking history, HTN, HLD,  DM2, GERD, overactive bladder, and anxiety, who presents with a concern for shortness of breath. Surgery consulted for concern for acute cholecystitis on RUQU/S however clinically appears to be biliary colic based on labs and exam.     Recommendations:   - F/u HIDA   - Rest of care per Medicine      ACS/Trauma Surgery  [id number]

## 2023-08-23 NOTE — PROGRESS NOTE ADULT - ASSESSMENT
Patient is a 68 year old female with 47 year 1PPD smoking history, well controlled HTN, HLD,  DM2, GERD, overactive bladder, and anxiety, who presents with a concern for shortness of breath concerning for COPD exacerbation. Found to have stone in gall bladder neck, VSS, CBC wnl concerning for biliary colic vs. acute cholecystitis.

## 2023-08-23 NOTE — DISCHARGE NOTE PROVIDER - NSDCFUSCHEDAPPT_GEN_ALL_CORE_FT
[address]  Scheduled Appointment: 09/25/2023    Jeremías Kohury  [address]  Scheduled Appointment: 09/25/2023

## 2023-08-23 NOTE — DISCHARGE NOTE PROVIDER - NSDCFUADDAPPT_GEN_ALL_CORE_FT
APPTS ARE READY TO BE MADE: [x ] YES    Best Family or Patient Contact (if needed): Self [phone]    Additional Information about above appointments (if needed):    1: Pulmonology Any Provider [phone]  2: Beverley Curtis [phone]    Other comments or requests:    APPTS ARE READY TO BE MADE: [x ] YES    Best Family or Patient Contact (if needed): Self [phone]    Additional Information about above appointments (if needed):    1: Pulmonology Any Provider [phone] within 2 weeks  2: Beverley Curtis [phone] within 2 weeks  3. Reji Durand [phone] within 2 weeks    Other comments or requests:    APPTS ARE READY TO BE MADE: [x ] YES    Best Family or Patient Contact (if needed): Self [phone]    Additional Information about above appointments (if needed):    1: Pulmonology Any Provider [phone] within 2 weeks  2: Beverley Curtis [phone] within 2 weeks  3. Reji Durand [phone] within 2 weeks    Other comments or requests:   Patient has an appointment scheduled for 9/25 at 10:30A at [address] [place] [address] for pulmonary. Outreached patient on 8/31 to assist in scheduling other referrals but was unable to leave a voicemail.  APPTS ARE READY TO BE MADE: [x ] YES    Best Family or Patient Contact (if needed): Self [phone]    Additional Information about above appointments (if needed):    1: Pulmonology Any Provider [phone] within 2 weeks  2: Beverley Curtis [phone] within 2 weeks  3. Reji Durand [phone] within 2 weeks    Other comments or requests:   Patient has an appointment scheduled for 9/25 at 10:30A at [address] [place] [address] for pulmonary. Outreached patient on 8/31 to assist in scheduling other referrals but was unable to leave a voicemail.     3 attempts were made to reach patient, which have been unsuccessful. Unable to leave voicemail on 09/01. Will await call back from patient to coordinate follow up care.

## 2023-08-24 ENCOUNTER — TRANSCRIPTION ENCOUNTER (OUTPATIENT)
Age: 68
End: 2023-08-24

## 2023-08-24 VITALS
OXYGEN SATURATION: 92 % | RESPIRATION RATE: 18 BRPM | HEART RATE: 72 BPM | TEMPERATURE: 98 F | SYSTOLIC BLOOD PRESSURE: 138 MMHG | DIASTOLIC BLOOD PRESSURE: 72 MMHG

## 2023-08-24 LAB
ALBUMIN SERPL ELPH-MCNC: 4.6 G/DL — SIGNIFICANT CHANGE UP (ref 3.3–5)
ALP SERPL-CCNC: 35 U/L — LOW (ref 40–120)
ALT FLD-CCNC: 11 U/L — SIGNIFICANT CHANGE UP (ref 10–45)
ANION GAP SERPL CALC-SCNC: 15 MMOL/L — SIGNIFICANT CHANGE UP (ref 5–17)
AST SERPL-CCNC: 14 U/L — SIGNIFICANT CHANGE UP (ref 10–40)
BILIRUB SERPL-MCNC: 0.2 MG/DL — SIGNIFICANT CHANGE UP (ref 0.2–1.2)
BUN SERPL-MCNC: 25 MG/DL — HIGH (ref 7–23)
CALCIUM SERPL-MCNC: 10 MG/DL — SIGNIFICANT CHANGE UP (ref 8.4–10.5)
CHLORIDE SERPL-SCNC: 100 MMOL/L — SIGNIFICANT CHANGE UP (ref 96–108)
CO2 SERPL-SCNC: 22 MMOL/L — SIGNIFICANT CHANGE UP (ref 22–31)
CREAT SERPL-MCNC: 0.59 MG/DL — SIGNIFICANT CHANGE UP (ref 0.5–1.3)
EGFR: 98 ML/MIN/1.73M2 — SIGNIFICANT CHANGE UP
GLUCOSE BLDC GLUCOMTR-MCNC: 122 MG/DL — HIGH (ref 70–99)
GLUCOSE BLDC GLUCOMTR-MCNC: 135 MG/DL — HIGH (ref 70–99)
GLUCOSE BLDC GLUCOMTR-MCNC: 144 MG/DL — HIGH (ref 70–99)
GLUCOSE BLDC GLUCOMTR-MCNC: 195 MG/DL — HIGH (ref 70–99)
GLUCOSE SERPL-MCNC: 161 MG/DL — HIGH (ref 70–99)
HCT VFR BLD CALC: 43 % — SIGNIFICANT CHANGE UP (ref 34.5–45)
HGB BLD-MCNC: 13.8 G/DL — SIGNIFICANT CHANGE UP (ref 11.5–15.5)
MAGNESIUM SERPL-MCNC: 2.1 MG/DL — SIGNIFICANT CHANGE UP (ref 1.6–2.6)
MCHC RBC-ENTMCNC: 29.8 PG — SIGNIFICANT CHANGE UP (ref 27–34)
MCHC RBC-ENTMCNC: 32.1 GM/DL — SIGNIFICANT CHANGE UP (ref 32–36)
MCV RBC AUTO: 92.9 FL — SIGNIFICANT CHANGE UP (ref 80–100)
NRBC # BLD: 0 /100 WBCS — SIGNIFICANT CHANGE UP (ref 0–0)
PHOSPHATE SERPL-MCNC: 3 MG/DL — SIGNIFICANT CHANGE UP (ref 2.5–4.5)
PLATELET # BLD AUTO: 360 K/UL — SIGNIFICANT CHANGE UP (ref 150–400)
POTASSIUM SERPL-MCNC: 4.7 MMOL/L — SIGNIFICANT CHANGE UP (ref 3.5–5.3)
POTASSIUM SERPL-SCNC: 4.7 MMOL/L — SIGNIFICANT CHANGE UP (ref 3.5–5.3)
PROT SERPL-MCNC: 7.5 G/DL — SIGNIFICANT CHANGE UP (ref 6–8.3)
RBC # BLD: 4.63 M/UL — SIGNIFICANT CHANGE UP (ref 3.8–5.2)
RBC # FLD: 13.6 % — SIGNIFICANT CHANGE UP (ref 10.3–14.5)
SODIUM SERPL-SCNC: 137 MMOL/L — SIGNIFICANT CHANGE UP (ref 135–145)
WBC # BLD: 9.7 K/UL — SIGNIFICANT CHANGE UP (ref 3.8–10.5)
WBC # FLD AUTO: 9.7 K/UL — SIGNIFICANT CHANGE UP (ref 3.8–10.5)

## 2023-08-24 PROCEDURE — A9537: CPT

## 2023-08-24 PROCEDURE — 85014 HEMATOCRIT: CPT

## 2023-08-24 PROCEDURE — 85379 FIBRIN DEGRADATION QUANT: CPT

## 2023-08-24 PROCEDURE — 71250 CT THORAX DX C-: CPT | Mod: MA

## 2023-08-24 PROCEDURE — 74177 CT ABD & PELVIS W/CONTRAST: CPT

## 2023-08-24 PROCEDURE — 36415 COLL VENOUS BLD VENIPUNCTURE: CPT

## 2023-08-24 PROCEDURE — 99239 HOSP IP/OBS DSCHRG MGMT >30: CPT | Mod: GC

## 2023-08-24 PROCEDURE — 84132 ASSAY OF SERUM POTASSIUM: CPT

## 2023-08-24 PROCEDURE — 84100 ASSAY OF PHOSPHORUS: CPT

## 2023-08-24 PROCEDURE — 82962 GLUCOSE BLOOD TEST: CPT

## 2023-08-24 PROCEDURE — 76705 ECHO EXAM OF ABDOMEN: CPT

## 2023-08-24 PROCEDURE — 94640 AIRWAY INHALATION TREATMENT: CPT

## 2023-08-24 PROCEDURE — 80061 LIPID PANEL: CPT

## 2023-08-24 PROCEDURE — 99285 EMERGENCY DEPT VISIT HI MDM: CPT | Mod: 25

## 2023-08-24 PROCEDURE — 85018 HEMOGLOBIN: CPT

## 2023-08-24 PROCEDURE — 83735 ASSAY OF MAGNESIUM: CPT

## 2023-08-24 PROCEDURE — 93306 TTE W/DOPPLER COMPLETE: CPT

## 2023-08-24 PROCEDURE — 85027 COMPLETE CBC AUTOMATED: CPT

## 2023-08-24 PROCEDURE — 83605 ASSAY OF LACTIC ACID: CPT

## 2023-08-24 PROCEDURE — 86803 HEPATITIS C AB TEST: CPT

## 2023-08-24 PROCEDURE — C8929: CPT

## 2023-08-24 PROCEDURE — 83036 HEMOGLOBIN GLYCOSYLATED A1C: CPT

## 2023-08-24 PROCEDURE — 83880 ASSAY OF NATRIURETIC PEPTIDE: CPT

## 2023-08-24 PROCEDURE — 84484 ASSAY OF TROPONIN QUANT: CPT

## 2023-08-24 PROCEDURE — 80053 COMPREHEN METABOLIC PANEL: CPT

## 2023-08-24 PROCEDURE — 78226 HEPATOBILIARY SYSTEM IMAGING: CPT

## 2023-08-24 PROCEDURE — 82330 ASSAY OF CALCIUM: CPT

## 2023-08-24 PROCEDURE — 82947 ASSAY GLUCOSE BLOOD QUANT: CPT

## 2023-08-24 PROCEDURE — 82435 ASSAY OF BLOOD CHLORIDE: CPT

## 2023-08-24 PROCEDURE — 82803 BLOOD GASES ANY COMBINATION: CPT

## 2023-08-24 PROCEDURE — 71045 X-RAY EXAM CHEST 1 VIEW: CPT

## 2023-08-24 PROCEDURE — 93005 ELECTROCARDIOGRAM TRACING: CPT

## 2023-08-24 PROCEDURE — 84295 ASSAY OF SERUM SODIUM: CPT

## 2023-08-24 PROCEDURE — 97162 PT EVAL MOD COMPLEX 30 MIN: CPT

## 2023-08-24 PROCEDURE — 85025 COMPLETE CBC W/AUTO DIFF WBC: CPT

## 2023-08-24 RX ORDER — TIOTROPIUM BROMIDE 18 UG/1
2 CAPSULE ORAL; RESPIRATORY (INHALATION)
Qty: 1 | Refills: 0
Start: 2023-08-24 | End: 2023-09-22

## 2023-08-24 RX ORDER — ALBUTEROL 90 UG/1
2 AEROSOL, METERED ORAL
Qty: 2 | Refills: 0
Start: 2023-08-24 | End: 2023-09-22

## 2023-08-24 RX ORDER — FLUTICASONE PROPIONATE 220 MCG
1 AEROSOL WITH ADAPTER (GRAM) INHALATION
Qty: 1 | Refills: 0
Start: 2023-08-24 | End: 2023-09-22

## 2023-08-24 RX ORDER — TIOTROPIUM BROMIDE 18 UG/1
2 CAPSULE ORAL; RESPIRATORY (INHALATION) DAILY
Refills: 0 | Status: DISCONTINUED | OUTPATIENT
Start: 2023-08-24 | End: 2023-08-24

## 2023-08-24 RX ORDER — OXYBUTYNIN CHLORIDE 5 MG
1 TABLET ORAL
Qty: 0 | Refills: 0 | DISCHARGE
Start: 2023-08-24

## 2023-08-24 RX ADMIN — QUETIAPINE FUMARATE 100 MILLIGRAM(S): 200 TABLET, FILM COATED ORAL at 21:21

## 2023-08-24 RX ADMIN — TIOTROPIUM BROMIDE 2 PUFF(S): 18 CAPSULE ORAL; RESPIRATORY (INHALATION) at 18:21

## 2023-08-24 RX ADMIN — ATORVASTATIN CALCIUM 10 MILLIGRAM(S): 80 TABLET, FILM COATED ORAL at 21:21

## 2023-08-24 RX ADMIN — PANTOPRAZOLE SODIUM 40 MILLIGRAM(S): 20 TABLET, DELAYED RELEASE ORAL at 05:51

## 2023-08-24 RX ADMIN — Medication 3 MILLILITER(S): at 18:21

## 2023-08-24 RX ADMIN — Medication 5 MILLIGRAM(S): at 05:51

## 2023-08-24 RX ADMIN — Medication 0.5 MILLIGRAM(S): at 05:51

## 2023-08-24 RX ADMIN — Medication 0.5 MILLIGRAM(S): at 18:21

## 2023-08-24 RX ADMIN — Medication 3 MILLILITER(S): at 05:52

## 2023-08-24 RX ADMIN — Medication 3 MILLILITER(S): at 11:56

## 2023-08-24 RX ADMIN — AZITHROMYCIN 500 MILLIGRAM(S): 500 TABLET, FILM COATED ORAL at 05:52

## 2023-08-24 RX ADMIN — Medication 3 MILLILITER(S): at 00:55

## 2023-08-24 RX ADMIN — Medication 40 MILLIGRAM(S): at 05:52

## 2023-08-24 RX ADMIN — Medication 5 MILLIGRAM(S): at 18:21

## 2023-08-24 RX ADMIN — ENOXAPARIN SODIUM 40 MILLIGRAM(S): 100 INJECTION SUBCUTANEOUS at 11:55

## 2023-08-24 RX ADMIN — Medication 1: at 13:26

## 2023-08-24 RX ADMIN — MIRTAZAPINE 45 MILLIGRAM(S): 45 TABLET, ORALLY DISINTEGRATING ORAL at 21:21

## 2023-08-24 RX ADMIN — Medication 30 MILLIGRAM(S): at 11:57

## 2023-08-24 NOTE — PROGRESS NOTE ADULT - SUBJECTIVE AND OBJECTIVE BOX
Patient is a 68y old  Female who presents with a chief complaint of shortness of breath (23 Aug 2023 12:51)      SUBJECTIVE / OVERNIGHT EVENTS:  Patient seen and evaluated at bedside.    Denies any fevers, chills, CP, or SOB.    Vital Signs Last 24 Hrs  T(C): 37.1 (24 Aug 2023 04:42), Max: 37.1 (24 Aug 2023 04:42)  T(F): 98.7 (24 Aug 2023 04:42), Max: 98.7 (24 Aug 2023 04:42)  HR: 70 (24 Aug 2023 04:42) (70 - 93)  BP: 111/63 (24 Aug 2023 04:42) (103/61 - 133/71)  BP(mean): --  RR: 18 (24 Aug 2023 04:42) (18 - 19)  SpO2: 92% (24 Aug 2023 04:42) (80% - 93%)    Parameters below as of 24 Aug 2023 04:42  Patient On (Oxygen Delivery Method): nasal cannula  O2 Flow (L/min): 2      PHYSICAL EXAM:  GENERAL: NAD, well-developed  CHEST/LUNG: Clear to auscultation bilaterally; No wheeze  HEART: Regular rate and rhythm; Normal S1 S2, No murmurs, rubs, or gallops  ABDOMEN: Soft, Nontender, Nondistended; Bowel sounds present  EXTREMITIES:  2+ Peripheral Pulses, No clubbing, cyanosis, or edema  PSYCH: AAOx3    LABS:                        13.5   9.14  )-----------( 342      ( 23 Aug 2023 07:24 )             41.9     Hgb Trend: 13.5<--, 14.8<--, 14.5<--  08-23    139  |  102  |  21  ----------------------------<  116<H>  4.4   |  23  |  0.65    Ca    9.9      23 Aug 2023 07:25  Phos  3.0     08-23  Mg     2.1     08-23    TPro  7.1  /  Alb  4.3  /  TBili  0.2  /  DBili  x   /  AST  15  /  ALT  13  /  AlkPhos  30<L>  08-23    Creatinine Trend: 0.65<--, 0.63<--, 0.62<--  LIVER FUNCTIONS - ( 23 Aug 2023 07:25 )  Alb: 4.3 g/dL / Pro: 7.1 g/dL / ALK PHOS: 30 U/L / ALT: 13 U/L / AST: 15 U/L / GGT: x                 Urinalysis Basic - ( 23 Aug 2023 07:25 )    Color: x / Appearance: x / SG: x / pH: x  Gluc: 116 mg/dL / Ketone: x  / Bili: x / Urobili: x   Blood: x / Protein: x / Nitrite: x   Leuk Esterase: x / RBC: x / WBC x   Sq Epi: x / Non Sq Epi: x / Bacteria: x     Patient is a 68y old  Female who presents with a chief complaint of shortness of breath (23 Aug 2023 12:51)      SUBJECTIVE / OVERNIGHT EVENTS:  Patient seen and evaluated at bedside. Patient is doing well, feels comfortable.     Denies any fevers, chills, CP, or SOB.    Vital Signs Last 24 Hrs  T(C): 37.1 (24 Aug 2023 04:42), Max: 37.1 (24 Aug 2023 04:42)  T(F): 98.7 (24 Aug 2023 04:42), Max: 98.7 (24 Aug 2023 04:42)  HR: 70 (24 Aug 2023 04:42) (70 - 93)  BP: 111/63 (24 Aug 2023 04:42) (103/61 - 133/71)  BP(mean): --  RR: 18 (24 Aug 2023 04:42) (18 - 19)  SpO2: 92% (24 Aug 2023 04:42) (80% - 93%)    Parameters below as of 24 Aug 2023 04:42  Patient On (Oxygen Delivery Method): nasal cannula  O2 Flow (L/min): 2      PHYSICAL EXAM:  GENERAL: NAD, well-developed. on 3L nc   CHEST/LUNG: Clear to auscultation bilaterally; No wheeze  HEART: Regular rate and rhythm; Normal S1 S2, No murmurs, rubs, or gallops  ABDOMEN: Mild tenderness to palpation of RUQ. Soft, Nondistended; Bowel sounds present  EXTREMITIES:  2+ Peripheral Pulses, No clubbing, cyanosis, or edema  PSYCH: AAOx3    LABS:                        13.5   9.14  )-----------( 342      ( 23 Aug 2023 07:24 )             41.9     Hgb Trend: 13.5<--, 14.8<--, 14.5<--  08-23    139  |  102  |  21  ----------------------------<  116<H>  4.4   |  23  |  0.65    Ca    9.9      23 Aug 2023 07:25  Phos  3.0     08-23  Mg     2.1     08-23    TPro  7.1  /  Alb  4.3  /  TBili  0.2  /  DBili  x   /  AST  15  /  ALT  13  /  AlkPhos  30<L>  08-23    Creatinine Trend: 0.65<--, 0.63<--, 0.62<--  LIVER FUNCTIONS - ( 23 Aug 2023 07:25 )  Alb: 4.3 g/dL / Pro: 7.1 g/dL / ALK PHOS: 30 U/L / ALT: 13 U/L / AST: 15 U/L / GGT: x                 Urinalysis Basic - ( 23 Aug 2023 07:25 )    Color: x / Appearance: x / SG: x / pH: x  Gluc: 116 mg/dL / Ketone: x  / Bili: x / Urobili: x   Blood: x / Protein: x / Nitrite: x   Leuk Esterase: x / RBC: x / WBC x   Sq Epi: x / Non Sq Epi: x / Bacteria: x

## 2023-08-24 NOTE — DISCHARGE NOTE NURSING/CASE MANAGEMENT/SOCIAL WORK - NSDCPEFALRISK_GEN_ALL_CORE
For information on Fall & Injury Prevention, visit: [email or web address] OR  [email or web address] OR  https://www.cdc.gov/steadi/patient.html

## 2023-08-24 NOTE — PROGRESS NOTE ADULT - PROBLEM SELECTOR PLAN 6
Patient has a history of anxiety on Mirtazipine, clonazepam, quetiapine, and paroxetine at home  PLAN  - continue home meds
Patient has a history of anxiety on Mirtazipine, clonazepam, quetiapine, and paroxetine at home  PLAN  - continue home meds

## 2023-08-24 NOTE — PROGRESS NOTE ADULT - PROBLEM SELECTOR PLAN 7
F: replete as needed  E: replete as needed  N: renal diet  GI: pantoprozole 40 mg   DVT: Lovenox  Dispo: pending PT eval and workup   FULL CODE
F: replete as needed  E: replete as needed  N: renal diet  GI: pantoprozole 40 mg   DVT: Lovenox  Dispo: pending PT eval and workup   FULL CODE

## 2023-08-24 NOTE — PROGRESS NOTE ADULT - PROBLEM SELECTOR PLAN 3
Patient has a history of T2DM on Metformin at home, last A1c reported 6.4 as per patient   PLAN  - HbA1c   - mISS
Patient has a history of T2DM on Metformin at home, last A1c reported 6.4 as per patient   PLAN  - HbA1c   - mISS

## 2023-08-24 NOTE — PROGRESS NOTE ADULT - PROBLEM SELECTOR PLAN 5
Patient has a history of overactive bladder, on Mirabegron and Fesoterodine at home   PLAN  - continue home meds
Patient has a history of overactive bladder, on Mirabegron and Fesoterodine at home   PLAN  - continue home meds

## 2023-08-24 NOTE — PROGRESS NOTE ADULT - PROBLEM SELECTOR PLAN 1
Patient presented with SOB with extertion and at rest, falls, and dry cough with occasional sputum production as well as chest pain, palpitations, L sided arm pain. Patient also reports some RUQ pain and increasing abdominal girth  PE CTABL   CBC CMP ABG WNL; CXR unremarkable  RUQ showed stone in gall bladder neck  Echo WNL   Sat 95 on 4L NC  Lactate 1.2   PT recs no needs   PLAN  - continue O2; wean from 4L --> 2L   - azithromycin prophylaxis; 1 dose of Zosyn iso RUQ findings  - f/u HIDA scan   - duonebs q6  - IV methylpred x1 followed by pred taper Patient presented with SOB with extertion and at rest, falls, and dry cough with occasional sputum production as well as chest pain, palpitations, L sided arm pain. Patient also reports some RUQ pain and increasing abdominal girth  PE CTABL   CBC CMP ABG WNL; CXR unremarkable  RUQ showed stone in gall bladder neck, HIDA scan and CTAP neg  Echo WNL   Sat 95 on 4L NC  Lactate 1.2   PT recs no needs   PLAN  - continue O2; wean from 4L --> 3L   - azithromycin prophylaxis; 1 dose of Zosyn iso RUQ findings  - duonebs q6  - IV methylpred x1 followed by pred taper

## 2023-08-24 NOTE — PROGRESS NOTE ADULT - PROBLEM SELECTOR PLAN 4
Patient has a history of well controlled hypertension, not on any meds  PLAN  - monitor BP
Patient has a history of well controlled hypertension, not on any meds  PLAN  - monitor BP

## 2023-08-24 NOTE — PROGRESS NOTE ADULT - ATTENDING COMMENTS
68F with a PMH of HTN, HLD, DM2, GERD, OAB, Anxiety and a 47 pack year smoking hx, recently quit 1 month ago presented with progressive SOB admitted for COPD Exacerbation.     Seen and examined at bedside. NAD.  Weaned to 2LNC  HIDA scan negative for cholecystitis    Acute Exacerbation of Newly Diagnosed COPD  Acute Hypoxic Respiratory Failure due to above  Pulmonary nodule, 5mm - 6 month f/u with repeat CT Chest  continue NC, taper  Start PO prednisone today, dc solumedrol  6MWT, PT Eval- no needs, may need O2 at home  Duonebs q6h  Azithromycin for 5 days, Day 2 today  DVT ppx  TTE reviewed, wnl  Outpt Pulmonary evaluation  DC planning - form filled out for home O2  d/w pt and team in detail at the bedside
68F with a PMH of HTN, HLD, DM2, GERD, OAB, Anxiety and a 47 pack year smoking hx, recently quit 1 month ago presented with progressive SOB admitted for COPD Exacerbation.     Seen and examined at bedside. She appears well.   Weaned to 2LNC - arrangement for home O2 in process    Acute Exacerbation of Newly Diagnosed COPD  Acute Hypoxic Respiratory Failure due to above  Pulmonary nodule, 5mm - 6 month f/u with repeat CT Chest  continue NC, taper  continue PO prednisone for 5 days  send inhalers for home, f/u Surgery outpt for elective cholecystectomy, dc home today, 45 minutes spent on dc planning  Duonebs q6h  Azithromycin for 5 days, Day 3 today  DVT ppx  TTE reviewed, wnl  Outpt Pulmonary evaluation  DC planning - form filled out for home O2  d/w pt and team in detail at the bedside

## 2023-08-24 NOTE — DISCHARGE NOTE NURSING/CASE MANAGEMENT/SOCIAL WORK - NSDCFUADDAPPT_GEN_ALL_CORE_FT
APPTS ARE READY TO BE MADE: [x ] YES    Best Family or Patient Contact (if needed): Self [phone]    Additional Information about above appointments (if needed):    1: Pulmonology Any Provider [phone]  2: Beverley Curtis [phone]    Other comments or requests:

## 2023-08-24 NOTE — PROGRESS NOTE ADULT - ASSESSMENT
Patient is a 68 year old female with 47 year 1PPD smoking history, well controlled HTN, HLD,  DM2, GERD, overactive bladder, and anxiety, who presents with a concern for shortness of breath concerning for COPD exacerbation. Found to have stone in gall bladder neck, VSS, CBC wnl concerning for biliary colic vs. acute cholecystitis.  Patient is a 68 year old female with 47 year 1PPD smoking history, well controlled HTN, HLD,  DM2, GERD, overactive bladder, and anxiety, who presents with a concern for shortness of breath concerning for COPD exacerbation, started on 4L NC. Found to have stone in gall bladder neck, VSS, CBC wnl concerning for biliary colic vs. acute cholecystitis. HIDA scan and CTAP were wnl.

## 2023-08-24 NOTE — DISCHARGE NOTE NURSING/CASE MANAGEMENT/SOCIAL WORK - PATIENT PORTAL LINK FT
You can access the FollowMyHealth Patient Portal offered by [facility] by registering at the following website: [email or web address]. By joining [facility]’s FollowMyHealth portal, you will also be able to view your health information using other applications (apps) compatible with our system.

## 2023-08-24 NOTE — CHART NOTE - NSCHARTNOTEFT_GEN_A_CORE
Patient was saturating at 80% at rest on room air, Patient presents with a diagnosis of COPD. Acute exacerbation is currently resolved and patient is in a chronic stable state. She will require 2 L of continuous oxygen at home for COPD. Room air at rest oxygen saturation is 80%

## 2023-08-24 NOTE — PROGRESS NOTE ADULT - PROBLEM SELECTOR PLAN 2
Patient has a history of HLD, on fenofibrate and pravastatin at home  PLAN  - continue home meds of pravastatin as fenofibrate can lead to gallstones

## 2023-08-28 RX ORDER — FENOFIBRATE,MICRONIZED 130 MG
1 CAPSULE ORAL
Refills: 0 | DISCHARGE

## 2023-08-29 PROBLEM — N32.81 OVERACTIVE BLADDER: Chronic | Status: ACTIVE | Noted: 2023-08-22

## 2023-08-29 PROBLEM — I10 ESSENTIAL (PRIMARY) HYPERTENSION: Chronic | Status: ACTIVE | Noted: 2023-08-22

## 2023-08-29 PROBLEM — E78.5 HYPERLIPIDEMIA, UNSPECIFIED: Chronic | Status: ACTIVE | Noted: 2023-08-22

## 2023-08-29 PROBLEM — E11.9 TYPE 2 DIABETES MELLITUS WITHOUT COMPLICATIONS: Chronic | Status: ACTIVE | Noted: 2023-08-22

## 2023-09-13 ENCOUNTER — INPATIENT (INPATIENT)
Facility: HOSPITAL | Age: 68
LOS: 5 days | Discharge: HOME | DRG: 418 | End: 2023-09-19
Attending: TRANSPLANT SURGERY | Admitting: TRANSPLANT SURGERY
Payer: COMMERCIAL

## 2023-09-13 ENCOUNTER — APPOINTMENT (OUTPATIENT)
Dept: TRAUMA SURGERY | Facility: CLINIC | Age: 68
End: 2023-09-13
Payer: MEDICARE

## 2023-09-13 VITALS
OXYGEN SATURATION: 90 % | HEART RATE: 83 BPM | SYSTOLIC BLOOD PRESSURE: 100 MMHG | RESPIRATION RATE: 20 BRPM | TEMPERATURE: 98 F | DIASTOLIC BLOOD PRESSURE: 57 MMHG

## 2023-09-13 DIAGNOSIS — Z96.641 PRESENCE OF RIGHT ARTIFICIAL HIP JOINT: Chronic | ICD-10-CM

## 2023-09-13 LAB
ALBUMIN SERPL ELPH-MCNC: 4.5 G/DL — SIGNIFICANT CHANGE UP (ref 3.3–5)
ALP SERPL-CCNC: 39 U/L — LOW (ref 40–120)
ALT FLD-CCNC: 17 U/L — SIGNIFICANT CHANGE UP (ref 10–45)
ANION GAP SERPL CALC-SCNC: 11 MMOL/L — SIGNIFICANT CHANGE UP (ref 5–17)
APTT BLD: 29.1 SEC — SIGNIFICANT CHANGE UP (ref 24.5–35.6)
AST SERPL-CCNC: 18 U/L — SIGNIFICANT CHANGE UP (ref 10–40)
BASE EXCESS BLDV CALC-SCNC: 4.2 MMOL/L — HIGH (ref -2–3)
BASOPHILS # BLD AUTO: 0.05 K/UL — SIGNIFICANT CHANGE UP (ref 0–0.2)
BASOPHILS NFR BLD AUTO: 0.8 % — SIGNIFICANT CHANGE UP (ref 0–2)
BILIRUB SERPL-MCNC: 0.2 MG/DL — SIGNIFICANT CHANGE UP (ref 0.2–1.2)
BUN SERPL-MCNC: 25 MG/DL — HIGH (ref 7–23)
CA-I SERPL-SCNC: 1.24 MMOL/L — SIGNIFICANT CHANGE UP (ref 1.15–1.33)
CALCIUM SERPL-MCNC: 9.8 MG/DL — SIGNIFICANT CHANGE UP (ref 8.4–10.5)
CHLORIDE BLDV-SCNC: 102 MMOL/L — SIGNIFICANT CHANGE UP (ref 96–108)
CHLORIDE SERPL-SCNC: 103 MMOL/L — SIGNIFICANT CHANGE UP (ref 96–108)
CO2 BLDV-SCNC: 33 MMOL/L — HIGH (ref 22–26)
CO2 SERPL-SCNC: 25 MMOL/L — SIGNIFICANT CHANGE UP (ref 22–31)
CREAT SERPL-MCNC: 0.75 MG/DL — SIGNIFICANT CHANGE UP (ref 0.5–1.3)
EGFR: 87 ML/MIN/1.73M2 — SIGNIFICANT CHANGE UP
EOSINOPHIL # BLD AUTO: 0.19 K/UL — SIGNIFICANT CHANGE UP (ref 0–0.5)
EOSINOPHIL NFR BLD AUTO: 3 % — SIGNIFICANT CHANGE UP (ref 0–6)
GAS PNL BLDV: 135 MMOL/L — LOW (ref 136–145)
GAS PNL BLDV: SIGNIFICANT CHANGE UP
GAS PNL BLDV: SIGNIFICANT CHANGE UP
GLUCOSE BLDV-MCNC: 101 MG/DL — HIGH (ref 70–99)
GLUCOSE SERPL-MCNC: 101 MG/DL — HIGH (ref 70–99)
HCO3 BLDV-SCNC: 31 MMOL/L — HIGH (ref 22–29)
HCT VFR BLD CALC: 39.3 % — SIGNIFICANT CHANGE UP (ref 34.5–45)
HCT VFR BLDA CALC: 41 % — SIGNIFICANT CHANGE UP (ref 34.5–46.5)
HGB BLD CALC-MCNC: 13.8 G/DL — SIGNIFICANT CHANGE UP (ref 11.7–16.1)
HGB BLD-MCNC: 12.8 G/DL — SIGNIFICANT CHANGE UP (ref 11.5–15.5)
IMM GRANULOCYTES NFR BLD AUTO: 0.2 % — SIGNIFICANT CHANGE UP (ref 0–0.9)
INR BLD: 0.97 RATIO — SIGNIFICANT CHANGE UP (ref 0.85–1.18)
LACTATE BLDV-MCNC: 0.9 MMOL/L — SIGNIFICANT CHANGE UP (ref 0.5–2)
LIDOCAIN IGE QN: 24 U/L — SIGNIFICANT CHANGE UP (ref 7–60)
LYMPHOCYTES # BLD AUTO: 1.98 K/UL — SIGNIFICANT CHANGE UP (ref 1–3.3)
LYMPHOCYTES # BLD AUTO: 30.9 % — SIGNIFICANT CHANGE UP (ref 13–44)
MCHC RBC-ENTMCNC: 30.2 PG — SIGNIFICANT CHANGE UP (ref 27–34)
MCHC RBC-ENTMCNC: 32.6 GM/DL — SIGNIFICANT CHANGE UP (ref 32–36)
MCV RBC AUTO: 92.7 FL — SIGNIFICANT CHANGE UP (ref 80–100)
MONOCYTES # BLD AUTO: 0.53 K/UL — SIGNIFICANT CHANGE UP (ref 0–0.9)
MONOCYTES NFR BLD AUTO: 8.3 % — SIGNIFICANT CHANGE UP (ref 2–14)
NEUTROPHILS # BLD AUTO: 3.65 K/UL — SIGNIFICANT CHANGE UP (ref 1.8–7.4)
NEUTROPHILS NFR BLD AUTO: 56.8 % — SIGNIFICANT CHANGE UP (ref 43–77)
NRBC # BLD: 0 /100 WBCS — SIGNIFICANT CHANGE UP (ref 0–0)
PCO2 BLDV: 55 MMHG — HIGH (ref 39–42)
PH BLDV: 7.36 — SIGNIFICANT CHANGE UP (ref 7.32–7.43)
PLATELET # BLD AUTO: 272 K/UL — SIGNIFICANT CHANGE UP (ref 150–400)
PO2 BLDV: 30 MMHG — SIGNIFICANT CHANGE UP (ref 25–45)
POTASSIUM BLDV-SCNC: 4.1 MMOL/L — SIGNIFICANT CHANGE UP (ref 3.5–5.1)
POTASSIUM SERPL-MCNC: 4 MMOL/L — SIGNIFICANT CHANGE UP (ref 3.5–5.3)
POTASSIUM SERPL-SCNC: 4 MMOL/L — SIGNIFICANT CHANGE UP (ref 3.5–5.3)
PROT SERPL-MCNC: 6.8 G/DL — SIGNIFICANT CHANGE UP (ref 6–8.3)
PROTHROM AB SERPL-ACNC: 10.7 SEC — SIGNIFICANT CHANGE UP (ref 9.5–13)
RBC # BLD: 4.24 M/UL — SIGNIFICANT CHANGE UP (ref 3.8–5.2)
RBC # FLD: 13.3 % — SIGNIFICANT CHANGE UP (ref 10.3–14.5)
SAO2 % BLDV: 47 % — LOW (ref 67–88)
SODIUM SERPL-SCNC: 139 MMOL/L — SIGNIFICANT CHANGE UP (ref 135–145)
TROPONIN T, HIGH SENSITIVITY RESULT: <6 NG/L — SIGNIFICANT CHANGE UP (ref 0–51)
WBC # BLD: 6.41 K/UL — SIGNIFICANT CHANGE UP (ref 3.8–10.5)
WBC # FLD AUTO: 6.41 K/UL — SIGNIFICANT CHANGE UP (ref 3.8–10.5)

## 2023-09-13 PROCEDURE — 99202 OFFICE O/P NEW SF 15 MIN: CPT

## 2023-09-13 PROCEDURE — 99285 EMERGENCY DEPT VISIT HI MDM: CPT | Mod: 25

## 2023-09-13 PROCEDURE — 76705 ECHO EXAM OF ABDOMEN: CPT | Mod: 26

## 2023-09-13 RX ORDER — ONDANSETRON 8 MG/1
4 TABLET, FILM COATED ORAL ONCE
Refills: 0 | Status: COMPLETED | OUTPATIENT
Start: 2023-09-13 | End: 2023-09-13

## 2023-09-13 RX ORDER — ACETAMINOPHEN 500 MG
1000 TABLET ORAL ONCE
Refills: 0 | Status: COMPLETED | OUTPATIENT
Start: 2023-09-13 | End: 2023-09-13

## 2023-09-13 RX ADMIN — Medication 400 MILLIGRAM(S): at 23:28

## 2023-09-13 NOTE — ED PROVIDER NOTE - ATTENDING CONTRIBUTION TO CARE
Patient with cholelithiasis and persistent right upper quadrant pain, bloating, nausea, and back pain since discharge on 8/24 when she was treated for copd and given steroids and antibiotics. Patient was seen by Dr. Gigi Iglesias today and sent in for work up and further evaluation and management by the ACS team for and admission with Pulmonology consultation.  moderate distress secondary to pain  right upper quadrant tenderness to palpation   no rebound/guarding  Kam Elmore MD, FACEP: In this physician's medical judgement based on clinical history and physical exam the patient's signs and symptoms lead to differential diagnoses which includes but is not limited to: biliary colic, cholecystitis, pancreatitis    Historical features, symptoms, and clinical exam not consistent with: acs    Labs were ordered and independently reviewed by me.  EKG was ordered and independently reviewed by me.  Imaging was ordered and reviewed by me.      Appropriate medications for the patient's presenting complaints were ordered, and effects were reassessed.     Patient's records including prior hospital visit, med and medical history were reviewed.   History assisted by GUALBERTO Iglesias.  Escalation to admission/observation was considered.    Will follow up on labs, therapeutics, imaging, reassess and disposition as clinically indicated.  *The above represents an initial assessment/impression. Please refer to my progress notes below for potential changes in patient clinical course*

## 2023-09-13 NOTE — ED PROVIDER NOTE - SHIFT CHANGE DETAILS
Kam Elmore MD FACEP note of transfer at the usual time of sign out: Receiving team will follow up on labs, analgesia, any clinical imaging, reassess and disposition as clinically indicated.  Details of patient and plan conveyed to receiving physician and conveyed back for understanding.  There were no questions at this time about the patient's status, disposition, and plan. Patient's care to be taken over by receiving physician at this time, all decisions regarding the progression of care will be made at their discretion.

## 2023-09-13 NOTE — ED PROVIDER NOTE - PHYSICAL EXAMINATION
Gen: well appearing, in no acute distress   Head: normal appearing  HEENT: normal conjunctiva, oral mucosa moist, vision grossly intact   Lung: no respiratory distress, speaking in full sentences, CTA b/l, no wheeze, crackles or rhonchi   CV: regular rate and rhythm, no murmurs  Abd: soft, mildly distended, RUQ tenderness, no guarding   MSK: no visible deformities, ambulating without difficulty, no CVA tenderness   Neuro: No focal deficits, AAOx3  Skin: Warm, no rashes   Psych: normal affect

## 2023-09-13 NOTE — ED ADULT TRIAGE NOTE - TEMPERATURE IN FAHRENHEIT (DEGREES F)
98.1 Melolabial Transposition Flap Text: The defect edges were debeveled with a #15 scalpel blade.  Given the location of the defect and the proximity to free margins a melolabial flap was deemed most appropriate.  Using a sterile surgical marker, an appropriate melolabial transposition flap was drawn incorporating the defect.    The area thus outlined was incised deep to adipose tissue with a #15 scalpel blade.  The skin margins were undermined to an appropriate distance in all directions utilizing iris scissors.

## 2023-09-13 NOTE — ED PROVIDER NOTE - PROGRESS NOTE DETAILS
Patient's other medical records under different [facility] [id number] EKG with anterior T wave inversions.  Similar morphology seen on EKG from November 2017.  No current chest pain.  Will add on troponin for surgical clearance. KWAME No acute surgical process on work-up.  However, patient is very symptomatic with biliary colic experiencing nausea and abdominal pain every time she eats.  She would really like to undergo an elective cholecystectomy.  Case discussed with surgery Dr. Cadena and Dr. Iglesias, patient would require pulmonology clearance prior to elective surgery due to new diagnosis of COPD.  Patient was diagnosed 2 and half weeks ago during her last admission and is on home O2 2 L and currently comfortable without exacerbation.  She has an appointment on September 25 outpatient with a pulmonologistCarmencita TORRES. Iris Moreno (Rodriguez) PGY3 pt has been sleeping comfortably. per surg team, asking for pulm consult from the ED for clearance. Iris (Marcello Moreno PGY3 pain worsening after eating. will give additional meds. pending pulm eval. At this patient was endorsed to me at change of shift.  At this time the patient has been in the emergency department for 17 hours.  The patient was pending a pulmonary consult.  I talked to the pulmonary department including Dr. Anabel Crump and Dr. Kenny Werner.  They state that they have not received a consultation for this patient from either surgery or emergency department.  I explained to them the need for the consultation and the reason and he agreed to see the patient promptly.–Basilio Haile I discussed this case with Dr. Chau and he will review the case with the resident get back to me promptly. -Basilio Haile MD-

## 2023-09-13 NOTE — ED PROVIDER NOTE - NSICDXPASTMEDICALHX_GEN_ALL_CORE_FT
PAST MEDICAL HISTORY:  COPD, mild      PAST MEDICAL HISTORY:  COPD, mild     DM (diabetes mellitus)

## 2023-09-13 NOTE — ED PROVIDER NOTE - OBJECTIVE STATEMENT
69 y/o F with PMHx of COPD (has rx for home O2, does not use all the time), DM who presents to ED c/o right upper abdominal pain with radiation to the R back onset ~2 weeks ago. Pain was initially intermittent, now constant with associated nausea, green tinged vomiting and decreased PO intake. During her last admission, she had imaging which demonstrated gallstones and was told she would need her gallbladder out in the future, but it was not emergent. Given symptoms, was sent from outpatient provider for ACS consult and to r/o cholecystitis vs. choledocolithiasis. Denies fevers, chest pain, difficulty breathing, diarrhea or constipation, urinary symptoms. NKDA. 67 y/o F with PMHx of COPD (has rx for home O2, does not use all the time), DM who presents to ED c/o right upper abdominal pain with radiation to the R back onset ~2 weeks ago. Pain was initially intermittent, now constant with associated nausea, green tinged vomiting and decreased PO intake. During her last admission, she had imaging which demonstrated gallstones and was told she would need her gallbladder out in the future, but it was not emergent. Given symptoms, was sent from outpatient provider for ACS consult and to r/o cholecystitis vs. choledocholithiasis. Denies fevers, chest pain, difficulty breathing, diarrhea or constipation, urinary symptoms. NKDA. 69 y/o F with PMHx of COPD (has rx for home O2, does not use all the time), DM who presents to ED c/o right upper abdominal pain with radiation to the R back onset ~2 weeks ago. Pain was initially intermittent, now constant with associated nausea, green tinged vomiting and decreased PO intake. During her last admission, she had imaging which demonstrated gallstones and was told she would need her gallbladder out in the future, but it was not emergent. Given symptoms, was sent from outpatient provider for ACS consult and to r/o cholecystitis vs. choledocholithiasis. Denies fevers, chest pain, difficulty breathing, diarrhea or constipation, urinary symptoms. NKDA.  Patient's other medical records under different MRN [id number] (admission under Dr. Lisa Louie)  PCP: Lois Louie

## 2023-09-13 NOTE — ED PROVIDER NOTE - CLINICAL SUMMARY MEDICAL DECISION MAKING FREE TEXT BOX
69 y/o F with PMHx of COPD (has rx for home O2, does not use all the time), DM who presents to ED c/o right upper abdominal pain with radiation to the R back onset ~2 weeks ago. Sent from outside provider to r/o cholecystitis vs choledocolithiasis vs cholelithiasis. Will check labs, RUQ u/s, ACS consulted. Anticipate admission for further management.

## 2023-09-14 DIAGNOSIS — K80.20 CALCULUS OF GALLBLADDER WITHOUT CHOLECYSTITIS WITHOUT OBSTRUCTION: ICD-10-CM

## 2023-09-14 LAB
APPEARANCE UR: CLEAR — SIGNIFICANT CHANGE UP
BACTERIA # UR AUTO: ABNORMAL
BILIRUB UR-MCNC: NEGATIVE — SIGNIFICANT CHANGE UP
COLOR SPEC: SIGNIFICANT CHANGE UP
DIFF PNL FLD: NEGATIVE — SIGNIFICANT CHANGE UP
EPI CELLS # UR: 0 /HPF — SIGNIFICANT CHANGE UP
GLUCOSE BLDC GLUCOMTR-MCNC: 103 MG/DL — HIGH (ref 70–99)
GLUCOSE UR QL: NEGATIVE — SIGNIFICANT CHANGE UP
KETONES UR-MCNC: NEGATIVE — SIGNIFICANT CHANGE UP
LEUKOCYTE ESTERASE UR-ACNC: ABNORMAL
NITRITE UR-MCNC: POSITIVE
PH UR: 6 — SIGNIFICANT CHANGE UP (ref 5–8)
PROT UR-MCNC: NEGATIVE — SIGNIFICANT CHANGE UP
RBC CASTS # UR COMP ASSIST: 1 /HPF — SIGNIFICANT CHANGE UP (ref 0–4)
SP GR SPEC: 1.01 — SIGNIFICANT CHANGE UP (ref 1.01–1.02)
UROBILINOGEN FLD QL: NEGATIVE — SIGNIFICANT CHANGE UP
WBC UR QL: 7 /HPF — HIGH (ref 0–5)

## 2023-09-14 PROCEDURE — 99221 1ST HOSP IP/OBS SF/LOW 40: CPT

## 2023-09-14 PROCEDURE — 99222 1ST HOSP IP/OBS MODERATE 55: CPT

## 2023-09-14 PROCEDURE — 71045 X-RAY EXAM CHEST 1 VIEW: CPT | Mod: 26

## 2023-09-14 RX ORDER — INSULIN LISPRO 100/ML
VIAL (ML) SUBCUTANEOUS
Refills: 0 | Status: DISCONTINUED | OUTPATIENT
Start: 2023-09-14 | End: 2023-09-16

## 2023-09-14 RX ORDER — GLUCAGON INJECTION, SOLUTION 0.5 MG/.1ML
1 INJECTION, SOLUTION SUBCUTANEOUS ONCE
Refills: 0 | Status: DISCONTINUED | OUTPATIENT
Start: 2023-09-14 | End: 2023-09-17

## 2023-09-14 RX ORDER — ONDANSETRON 8 MG/1
4 TABLET, FILM COATED ORAL ONCE
Refills: 0 | Status: COMPLETED | OUTPATIENT
Start: 2023-09-14 | End: 2023-09-14

## 2023-09-14 RX ORDER — CEFTRIAXONE 500 MG/1
1000 INJECTION, POWDER, FOR SOLUTION INTRAMUSCULAR; INTRAVENOUS ONCE
Refills: 0 | Status: COMPLETED | OUTPATIENT
Start: 2023-09-14 | End: 2023-09-14

## 2023-09-14 RX ORDER — SODIUM CHLORIDE 9 MG/ML
1000 INJECTION, SOLUTION INTRAVENOUS
Refills: 0 | Status: DISCONTINUED | OUTPATIENT
Start: 2023-09-14 | End: 2023-09-17

## 2023-09-14 RX ORDER — DEXTROSE 50 % IN WATER 50 %
25 SYRINGE (ML) INTRAVENOUS ONCE
Refills: 0 | Status: DISCONTINUED | OUTPATIENT
Start: 2023-09-14 | End: 2023-09-17

## 2023-09-14 RX ORDER — MORPHINE SULFATE 50 MG/1
4 CAPSULE, EXTENDED RELEASE ORAL ONCE
Refills: 0 | Status: DISCONTINUED | OUTPATIENT
Start: 2023-09-14 | End: 2023-09-14

## 2023-09-14 RX ORDER — DEXTROSE 50 % IN WATER 50 %
25 SYRINGE (ML) INTRAVENOUS ONCE
Refills: 0 | Status: DISCONTINUED | OUTPATIENT
Start: 2023-09-14 | End: 2023-09-18

## 2023-09-14 RX ORDER — DEXTROSE 50 % IN WATER 50 %
12.5 SYRINGE (ML) INTRAVENOUS ONCE
Refills: 0 | Status: DISCONTINUED | OUTPATIENT
Start: 2023-09-14 | End: 2023-09-17

## 2023-09-14 RX ORDER — DEXTROSE 50 % IN WATER 50 %
15 SYRINGE (ML) INTRAVENOUS ONCE
Refills: 0 | Status: DISCONTINUED | OUTPATIENT
Start: 2023-09-14 | End: 2023-09-17

## 2023-09-14 RX ORDER — KETOROLAC TROMETHAMINE 30 MG/ML
15 SYRINGE (ML) INJECTION ONCE
Refills: 0 | Status: DISCONTINUED | OUTPATIENT
Start: 2023-09-14 | End: 2023-09-14

## 2023-09-14 RX ORDER — INSULIN LISPRO 100/ML
VIAL (ML) SUBCUTANEOUS AT BEDTIME
Refills: 0 | Status: DISCONTINUED | OUTPATIENT
Start: 2023-09-14 | End: 2023-09-16

## 2023-09-14 RX ORDER — SODIUM CHLORIDE 9 MG/ML
1000 INJECTION INTRAMUSCULAR; INTRAVENOUS; SUBCUTANEOUS
Refills: 0 | Status: DISCONTINUED | OUTPATIENT
Start: 2023-09-14 | End: 2023-09-14

## 2023-09-14 RX ORDER — ENOXAPARIN SODIUM 100 MG/ML
40 INJECTION SUBCUTANEOUS EVERY 24 HOURS
Refills: 0 | Status: DISCONTINUED | OUTPATIENT
Start: 2023-09-14 | End: 2023-09-18

## 2023-09-14 RX ADMIN — MORPHINE SULFATE 4 MILLIGRAM(S): 50 CAPSULE, EXTENDED RELEASE ORAL at 18:16

## 2023-09-14 RX ADMIN — Medication 15 MILLIGRAM(S): at 19:29

## 2023-09-14 RX ADMIN — ONDANSETRON 4 MILLIGRAM(S): 8 TABLET, FILM COATED ORAL at 01:04

## 2023-09-14 RX ADMIN — MORPHINE SULFATE 4 MILLIGRAM(S): 50 CAPSULE, EXTENDED RELEASE ORAL at 19:29

## 2023-09-14 RX ADMIN — SODIUM CHLORIDE 100 MILLILITER(S): 9 INJECTION INTRAMUSCULAR; INTRAVENOUS; SUBCUTANEOUS at 18:17

## 2023-09-14 RX ADMIN — Medication 15 MILLIGRAM(S): at 01:05

## 2023-09-14 RX ADMIN — ONDANSETRON 4 MILLIGRAM(S): 8 TABLET, FILM COATED ORAL at 13:25

## 2023-09-14 RX ADMIN — Medication 1000 MILLIGRAM(S): at 19:29

## 2023-09-14 RX ADMIN — CEFTRIAXONE 100 MILLIGRAM(S): 500 INJECTION, POWDER, FOR SOLUTION INTRAMUSCULAR; INTRAVENOUS at 03:15

## 2023-09-14 RX ADMIN — MORPHINE SULFATE 4 MILLIGRAM(S): 50 CAPSULE, EXTENDED RELEASE ORAL at 13:24

## 2023-09-14 NOTE — ED ADULT NURSE REASSESSMENT NOTE - NS ED NURSE REASSESS COMMENT FT1
Report received from CUATE Montgomery. PT is resting comfortably in bed, breathing unlabored and sating 94% on 3L nasal cannula, and speaking in complete sentences. Updated PT on plan of care. PT admitted to surgery, awaiting a bed. Safety and comfort maintained.

## 2023-09-14 NOTE — H&P ADULT - ATTENDING COMMENTS
Patient referred from the office to the ED today for further workup of persistent abdominal pain and dark urine.   Was admitted recently for RUQ pain (dx biliary colic) but her operation was deferred due to new onset COPD with home O2 and a COPD exacerbation requiring abx and steroids. She was discharged with plans to follow up with out-patient pulmonology for pre-op optimization, but was not able to make it to her scheduled appointment before coming back to the ED now.     In the ED, WBC is normal, US shows gallstones but no wall thickening or pericholecystic fluid.   Her tbili is normal: no evidence of biliary obstruction  lipase normal: no pancreatitis.   Diagnosis remains biliary colic.     Pt was seen by pulmonary medicine in the ED and they report she is optimized for surgery. Pt was admitted to the surgical service and was added on for an elective cholecystectomy. She is aware that prolonged intubation may result. If significant, a tracheostomy may be necessary. Pt understands these risks and requests cholecystectomy.

## 2023-09-14 NOTE — CONSULT NOTE ADULT - ASSESSMENT
68 year old female long time smoker (quit 3-4 months ago) with a history of newly diagnosed COPD as of 8/22/23 admission presents with symptoms consistent with biliary colic and is potentially planned for laparoscopic cholecystectomy. Pulmonary is consulted for preoperative risk stratification.     Unfortunately, we do not have PFT's or prior data to better qualify her disease status. She is a new diagnosis as of her recent admission.   The patients Valencia respiratory risk is 3% risk of mechanical ventilation for >48 hours after surgery or unplanned intubation <30 days of surgery. Her ARISCAT Score is 59 considering mostly driven by her oxygen saturation and recent respiratory infection requiring antibiotics, placing her at high risk 42.1% of in hospital pulmonary complications including respiratory failure, respiratory infection, pleural effusion, atalectasis pneumothorax, bronchospasm, aspiration pneumonitis.   That being said, she is not currently in an exacerbation. She is not wheezing and reportedly at her baseline respiratory status. Her pulmonary disease would not be prohibitive for an urgent, emergent, or necessary procedure. Would ensure the following if she is to go to the OR.    - She should be on around the clock duo-neb q 4 hours  - She has a CO2 of 55 on her blood gas, would ensure BiPAP is at bedside as she may be prone to retain with sedating medications  - Would be judicious with any sedating medications including pain medications  - Would ensure aspiration precautions in place, oob as soon as able and IS bedside post-procedure  -  68 year old female long time smoker (quit 3-4 months ago) with a history of newly diagnosed COPD as of 8/22/23 admission presents with symptoms consistent with biliary colic and is potentially planned for laparoscopic cholecystectomy. Pulmonary is consulted for preoperative risk stratification.     Unfortunately, we do not have PFT's or prior data to better qualify her disease status. She is a new diagnosis as of her recent admission.   The patients Valencia respiratory risk is 3% risk of mechanical ventilation for >48 hours after surgery or unplanned intubation <30 days of surgery. Her ARISCAT Score is 59 considering mostly driven by her oxygen saturation and recent respiratory infection requiring antibiotics, placing her at high risk 42.1% of in hospital pulmonary complications including respiratory failure, respiratory infection, pleural effusion, atalectasis pneumothorax, bronchospasm, aspiration pneumonitis.   That being said, she is not currently in an exacerbation. She is not wheezing and reportedly at her baseline respiratory status. Her pulmonary disease would not be prohibitive for an urgent, emergent, or necessary procedure. Would ensure the following if she is to go to the OR.    - She should be on around the clock duo-neb q 4 hours  - She has a CO2 of 55 on her blood gas, would ensure BiPAP is at bedside as she may be prone to retain with sedating medications  - Would be judicious with any sedating medications including pain medications  - Would ensure aspiration precautions in place, OOB as soon as able and IS bedside post-procedure  - Supplemental oxygen to goal 88-92%

## 2023-09-14 NOTE — PATIENT PROFILE ADULT - NSPROHMDIABETBLDGLCUSUAL_GEN_A_NUR
60F hx DM, nonfunctioning 5cm left adrenal mass and 3cm left renal mass here for robotic left adrenalectomy and partial nephrectomy. Currently feels well without complaints. unknown

## 2023-09-14 NOTE — CONSULT NOTE ADULT - SUBJECTIVE AND OBJECTIVE BOX
Patient is a 68y old  Female who presents with a chief complaint of RUQ    HPI: 67 y/o F with PMHx of COPD (has rx for home O2, does not use all the time), DM who presents to ED c/o right upper abdominal pain with radiation to the R back onset ~2 weeks ago. Pain was initially intermittent, now constant with associated nausea, green tinged vomiting and decreased PO intake. During her last admission, she had imaging which demonstrated gallstones and was told she would need her gallbladder out in the future, but it was not emergent. Given symptoms, was sent from outpatient provider for ACS consult and to r/o cholecystitis vs. choledocholithiasis. Denies fevers, chest pain, difficulty breathing, diarrhea or constipation, urinary symptoms. NKDA.    In ED, patient complains of RUQ pain, Normal WBC, US: cholelithiasis and adenomyomatosis, collapsed GB, no signs of acute cholecystitis.       PAST MEDICAL & SURGICAL HISTORY:  COPD, mild      DM (diabetes mellitus)      MEDICATIONS  (STANDING):    MEDICATIONS  (PRN):      Allergies    Allergy Status Unknown    Intolerances        SOCIAL HISTORY:  Alcohol: Denied  Smoking: Nonsmoker  Drug Use: Denied  Marital Status:         FAMILY HISTORY:      Vital Signs Last 24 Hrs  T(C): 36.7 (13 Sep 2023 22:08), Max: 36.7 (13 Sep 2023 22:08)  T(F): 98.1 (13 Sep 2023 22:08), Max: 98.1 (13 Sep 2023 22:08)  HR: 73 (13 Sep 2023 23:20) (73 - 83)  BP: 111/68 (13 Sep 2023 23:20) (100/57 - 111/68)  BP(mean): --  RR: 19 (13 Sep 2023 23:20) (19 - 20)  SpO2: 94% (13 Sep 2023 23:20) (90% - 94%)    Parameters below as of 13 Sep 2023 23:20  Patient On (Oxygen Delivery Method): nasal cannula  O2 Flow (L/min): 2      PHYSICAL EXAM:  Constitutional: well developed, well nourished, NAD  Eyes: anicteric  ENMT: normal facies, symmetric  Neck: supple  Respiratory: CTA bilat  Cardiovascular: RRR  Gastrointestinal: abdomen soft, nontender, nondistended. No obvious masses. No peritonitis  Extremities: FROM, warm  Neurological: intact, non-focal  Skin: no gross lesions  Lymph Nodes: no gross adenopathy  Musculoskeletal: equal strength bilateral upper and lower extremities  Psychiatric: oriented x 3; appropriate  Rectal:        LABS:                        12.8   6.41  )-----------( 272      ( 13 Sep 2023 23:04 )             39.3         139  |  103  |  25<H>  ----------------------------<  101<H>  4.0   |  25  |  0.75    Ca    9.8      13 Sep 2023 23:04    TPro  6.8  /  Alb  4.5  /  TBili  0.2  /  DBili  x   /  AST  18  /  ALT  17  /  AlkPhos  39<L>      PT/INR - ( 13 Sep 2023 23:04 )   PT: 10.7 sec;   INR: 0.97 ratio         PTT - ( 13 Sep 2023 23:04 )  PTT:29.1 sec  Urinalysis Basic - ( 14 Sep 2023 01:43 )    Color: Light Yellow / Appearance: Clear / S.013 / pH: x  Gluc: x / Ketone: Negative  / Bili: Negative / Urobili: Negative   Blood: x / Protein: Negative / Nitrite: Positive   Leuk Esterase: Large / RBC: 1 /hpf / WBC 7 /HPF   Sq Epi: x / Non Sq Epi: x / Bacteria: Many        RADIOLOGY & ADDITIONAL STUDIES:

## 2023-09-14 NOTE — H&P ADULT - ASSESSMENT
67 y/o F with PMHx of COPD (has rx for home O2, does not use all the time), DM who presents to ED c/o right upper abdominal pain. In ED, patient complains of RUQ pain, Normal WBC, US: cholelithiasis and adenomyomatosis, collapsed GB, no signs of acute cholecystitis. Consistent with billary cholic.     PLAN:  - No acute surgical intervention  - Patient has no signs of acute cholecystitis, clinical picture consistent with biliary cholic  - Pt seen by pulmonology -> pt w/o COPD exacerbation   - Pt requires further medical optimization. Will admit for observation given persistent symptoms in ED.     Discussed with Dr. Chau     ACS   [id number]

## 2023-09-14 NOTE — CONSULT NOTE ADULT - ATTENDING COMMENTS
ATTENDING ATTESTATION  I have seen and examined this patient with the resident housestaff. I have reviewed all labs, imaging and reports. I have participated in formulating the plan, and have read and agree with the history, ROS, exam, assessment and plan as stated above.     Patient referred from the office to the ED today for further workup of persistent abdominal pain and dark urine.   Was admitted recently for RUQ pain (dx biliary colic) but her operation was deferred due to new onset COPD with home O2 and a COPD exacerbation requiring abx and steroids. She was discharged with plans to follow up with out-patient pulmonology for pre-op optimization, but was not able to make it to her scheduled appointment before coming back to the ED now.     In the ED, WBC is normal, US shows gallstones but no wall thickening or pericholecystic fluid.   Her tbili is normal: no evidence of biliary obstruction  lipase normal: no pancreatitis.   Diagnosis remains biliary colic.     I recommend medical and pulmonary evaluation for risk stratification and any pre-op optimization.   If she is able to be optimized for lap ollie with general anesthesia on this admission, can do surgery on this admission.   If not optimized, can be scheduled for elective lap ollie.     I have discussed this case with the ED team, and the ACS service will follow for possible OR.     Kari Cadena M.D., M.S.  Dept of Trauma, Emergency General Surgery and Critical Care

## 2023-09-14 NOTE — H&P ADULT - HISTORY OF PRESENT ILLNESS
69 y/o F with PMHx of COPD (has rx for home O2, does not use all the time), DM who presents to ED c/o right upper abdominal pain with radiation to the R back onset ~2 weeks ago. Pain was initially intermittent, now constant with associated nausea, green tinged vomiting and decreased PO intake. During her last admission, she had imaging which demonstrated gallstones and was told she would need her gallbladder out in the future, but it was not emergent. Given symptoms, was sent from outpatient provider for ACS consult and to r/o cholecystitis vs. choledocholithiasis. Denies fevers, chest pain, difficulty breathing, diarrhea or constipation, urinary symptoms. NKDA.    In ED, patient complains of RUQ pain, Normal WBC, US: cholelithiasis and adenomyomatosis, collapsed GB, no signs of acute cholecystitis.

## 2023-09-14 NOTE — ED ADULT NURSE NOTE - OBJECTIVE STATEMENT
67 y/o Female presents to ED via EMS from home with c/o abd pain. PMH of DM, COPD on 2L NC baseline, GERD. Pt states RUQ pain began about 2 weeks ago radiating to R back. Pt was seen outpatient and sent to ED to r/o cholecystitis. Pt has known gallstones. Endorses nausea, vomited td. Denies SOB, CP, HA, dizziness,  back pain, urinary s/s. Pt is A&Ox3, well appearing/ speaking full sentences without difficulty. Airway patent with spontaneous unlabored breathing, Equal B/L upper and lower extremity strength, skin is warm and normal color for race. No diaphoresis or edema noted. Abd soft, nondistended and tender to palpation in RUQ. Safety maintained bed in lowest position and locked.

## 2023-09-14 NOTE — CONSULT NOTE ADULT - SUBJECTIVE AND OBJECTIVE BOX
HPI:  68 year old female long time smoker (quit 3 months ago) with a history of newly diagnosed COPD as of   PAST MEDICAL & SURGICAL HISTORY:  COPD, mild      DM (diabetes mellitus)          FAMILY HISTORY:      SOCIAL HISTORY:  Smoking: __ packs x ___ years  EtOH Use:  Marital Status:  Occupation:  Exposures:  Recent Travel:    Allergies    Allergy Status Unknown    Intolerances        HOME MEDICATIONS:    REVIEW OF SYSTEMS:  Constitutional: No fevers or chills. No weight loss. No fatigue or generalised malaise.  Eyes: No itching or discharge from the eyes  ENT: No ear pain. No ear discharge. No nasal congestion. No post nasal drip. No epistaxis. No throat pain. No sore throat. No difficulty swallowing.   CV: No chest pain. No palpitations. No lightheadedness or dizziness.   Resp: No dyspnea at rest. No dyspnea on exertion. No orthopnea. No wheezing. No cough. No stridor. No sputum production. No chest pain with respiration.  GI: No nausea. No vomiting. No diarrhea.  MSK: No joint pain or pain in any extremities  Integumentary: No skin lesions. No pedal edema.  Neurological: No gross motor weakness. No sensory changes.    [ ] All other systems negative  [ ] Unable to assess ROS because ________    OBJECTIVE:  ICU Vital Signs Last 24 Hrs  T(C): 36.6 (14 Sep 2023 11:15), Max: 37.1 (14 Sep 2023 05:21)  T(F): 97.8 (14 Sep 2023 11:15), Max: 98.7 (14 Sep 2023 05:21)  HR: 72 (14 Sep 2023 11:15) (71 - 83)  BP: 127/62 (14 Sep 2023 11:15) (100/57 - 127/62)  BP(mean): 83 (14 Sep 2023 11:15) (83 - 83)  ABP: --  ABP(mean): --  RR: 18 (14 Sep 2023 11:15) (18 - 20)  SpO2: 94% (14 Sep 2023 11:15) (77% - 94%)    O2 Parameters below as of 14 Sep 2023 11:15  Patient On (Oxygen Delivery Method): nasal cannula  O2 Flow (L/min): 3            CAPILLARY BLOOD GLUCOSE          PHYSICAL EXAM:  General: Awake, alert, oriented X 3.   HEENT: Atraumatic, normocephalic.                 Mallampatti Grade                 No nasal congestion.                No tonsillar or pharyngeal exudates.  Lymph Nodes: No palpable lymphadenopathy  Neck: No JVD. No carotid bruit.   Respiratory: Normal chest expansion                         Normal percussion                         Normal and equal air entry                         No wheeze, rhonchi or rales.  Cardiovascular: S1 S2 normal. No murmurs, rubs or gallops.   Abdomen: Soft, non-tender, non-distended. No organomegaly.  Extremities: Warm to touch. Peripheral pulse palpable. No pedal edema.   Skin: No rashes or skin lesions  Neurological: Motor and sensory examination equal and normal in all four extremities.  Psychiatry: Appropriate mood and affect.    HOSPITAL MEDICATIONS:  MEDICATIONS  (STANDING):    MEDICATIONS  (PRN):      LABS:                        12.8   6.41  )-----------( 272      ( 13 Sep 2023 23:04 )             39.3         139  |  103  |  25<H>  ----------------------------<  101<H>  4.0   |  25  |  0.75    Ca    9.8      13 Sep 2023 23:04    TPro  6.8  /  Alb  4.5  /  TBili  0.2  /  DBili  x   /  AST  18  /  ALT  17  /  AlkPhos  39<L>      PT/INR - ( 13 Sep 2023 23:04 )   PT: 10.7 sec;   INR: 0.97 ratio         PTT - ( 13 Sep 2023 23:04 )  PTT:29.1 sec  Urinalysis Basic - ( 14 Sep 2023 01:43 )    Color: Light Yellow / Appearance: Clear / S.013 / pH: x  Gluc: x / Ketone: Negative  / Bili: Negative / Urobili: Negative   Blood: x / Protein: Negative / Nitrite: Positive   Leuk Esterase: Large / RBC: 1 /hpf / WBC 7 /HPF   Sq Epi: x / Non Sq Epi: x / Bacteria: Many        Venous Blood Gas:   @ 22:58  7.36/55/30/31/47.0  VBG Lactate: 0.9      MICROBIOLOGY:     RADIOLOGY:  [ ] Reviewed and interpreted by me    Point of Care Ultrasound Findings;    PFT:    EKG: HPI:  68 year old female long time smoker (quit 3-4 months ago) with a history of newly diagnosed COPD as of 23 admission presents with symptoms consistent with biliary colic and is potentially planned for laparoscopic cholecystectomy.     Briefly, about 3 weeks ago she presented with 1 month of progressive cough and shortness of breath. She had associated cough and was treated for a COPD exacerbation with steroids and antibiotics. She was also found to be hypoxic and discharged on home oxygen. She reports she does get a little short of breath when she ambulates to the bathroom but otherwise has been feeling back to her baseline since discharge. She was intended to follow up at [address] [place] [address] to establish care with pulmonology but had worsening abdominal symptoms and returns to the hospital before even making it to her appointment.     She is a long time smoker, 45 pack years and quit 3.5 months ago. She reports she never knew she had any lung disease until a   PAST MEDICAL & SURGICAL HISTORY:  COPD, mild      DM (diabetes mellitus)          FAMILY HISTORY:      SOCIAL HISTORY:  Smoking: __ packs x ___ years  EtOH Use:  Marital Status:  Occupation:  Exposures:  Recent Travel:    Allergies    Allergy Status Unknown    Intolerances        HOME MEDICATIONS:    REVIEW OF SYSTEMS:  Constitutional: No fevers or chills. No weight loss. No fatigue or generalised malaise.  Eyes: No itching or discharge from the eyes  ENT: No ear pain. No ear discharge. No nasal congestion. No post nasal drip. No epistaxis. No throat pain. No sore throat. No difficulty swallowing.   CV: No chest pain. No palpitations. No lightheadedness or dizziness.   Resp: No dyspnea at rest. No dyspnea on exertion. No orthopnea. No wheezing. No cough. No stridor. No sputum production. No chest pain with respiration.  GI: No nausea. No vomiting. No diarrhea.  MSK: No joint pain or pain in any extremities  Integumentary: No skin lesions. No pedal edema.  Neurological: No gross motor weakness. No sensory changes.    [ ] All other systems negative  [ ] Unable to assess ROS because ________    OBJECTIVE:  ICU Vital Signs Last 24 Hrs  T(C): 36.6 (14 Sep 2023 11:15), Max: 37.1 (14 Sep 2023 05:21)  T(F): 97.8 (14 Sep 2023 11:15), Max: 98.7 (14 Sep 2023 05:21)  HR: 72 (14 Sep 2023 11:15) (71 - 83)  BP: 127/62 (14 Sep 2023 11:15) (100/57 - 127/62)  BP(mean): 83 (14 Sep 2023 11:15) (83 - 83)  ABP: --  ABP(mean): --  RR: 18 (14 Sep 2023 11:15) (18 - 20)  SpO2: 94% (14 Sep 2023 11:15) (77% - 94%)    O2 Parameters below as of 14 Sep 2023 11:15  Patient On (Oxygen Delivery Method): nasal cannula  O2 Flow (L/min): 3            CAPILLARY BLOOD GLUCOSE          PHYSICAL EXAM:  General: Awake, alert, oriented X 3.   HEENT: Atraumatic, normocephalic.                 Mallampatti Grade                 No nasal congestion.                No tonsillar or pharyngeal exudates.  Lymph Nodes: No palpable lymphadenopathy  Neck: No JVD. No carotid bruit.   Respiratory: Normal chest expansion                         Normal percussion                         Normal and equal air entry                         No wheeze, rhonchi or rales.  Cardiovascular: S1 S2 normal. No murmurs, rubs or gallops.   Abdomen: Soft, non-tender, non-distended. No organomegaly.  Extremities: Warm to touch. Peripheral pulse palpable. No pedal edema.   Skin: No rashes or skin lesions  Neurological: Motor and sensory examination equal and normal in all four extremities.  Psychiatry: Appropriate mood and affect.    HOSPITAL MEDICATIONS:  MEDICATIONS  (STANDING):    MEDICATIONS  (PRN):      LABS:                        12.8   6.41  )-----------( 272      ( 13 Sep 2023 23:04 )             39.3         139  |  103  |  25<H>  ----------------------------<  101<H>  4.0   |  25  |  0.75    Ca    9.8      13 Sep 2023 23:04    TPro  6.8  /  Alb  4.5  /  TBili  0.2  /  DBili  x   /  AST  18  /  ALT  17  /  AlkPhos  39<L>  -13    PT/INR - ( 13 Sep 2023 23:04 )   PT: 10.7 sec;   INR: 0.97 ratio         PTT - ( 13 Sep 2023 23:04 )  PTT:29.1 sec  Urinalysis Basic - ( 14 Sep 2023 01:43 )    Color: Light Yellow / Appearance: Clear / S.013 / pH: x  Gluc: x / Ketone: Negative  / Bili: Negative / Urobili: Negative   Blood: x / Protein: Negative / Nitrite: Positive   Leuk Esterase: Large / RBC: 1 /hpf / WBC 7 /HPF   Sq Epi: x / Non Sq Epi: x / Bacteria: Many        Venous Blood Gas:   @ 22:58  7.36/55/30/31/47.0  VBG Lactate: 0.9      MICROBIOLOGY:     RADIOLOGY:  [ ] Reviewed and interpreted by me    Point of Care Ultrasound Findings;    PFT:    EKG: HPI:  68 year old female long time smoker (quit 3-4 months ago) with a history of newly diagnosed COPD as of 23 admission presents with symptoms consistent with biliary colic and is potentially planned for laparoscopic cholecystectomy.     Briefly, about 3 weeks ago she presented with 1 month of progressive cough and shortness of breath. She had associated cough and was treated for a COPD exacerbation with steroids and antibiotics. She was also found to be hypoxic and discharged on home oxygen. She reports she does get a little short of breath when she ambulates to the bathroom but otherwise has been feeling back to her baseline since discharge. She was intended to follow up at [address] [place] [address] to establish care with pulmonology but had worsening abdominal symptoms and returns to the hospital before even making it to her appointment.     She is a long time smoker, 45 pack years and quit 3.5 months ago. She reports she never knew she had any lung disease until her prior admission.     PAST MEDICAL & SURGICAL HISTORY:  COPD, mild      DM (diabetes mellitus)          FAMILY HISTORY:      SOCIAL HISTORY:  Smoking: __ packs x ___ years  EtOH Use:  Marital Status:  Occupation:  Exposures:  Recent Travel:    Allergies    Allergy Status Unknown    Intolerances        HOME MEDICATIONS:    REVIEW OF SYSTEMS:  Constitutional: No fevers or chills. No weight loss. No fatigue or generalised malaise.  Eyes: No itching or discharge from the eyes  ENT: No ear pain. No ear discharge. No nasal congestion. No post nasal drip. No epistaxis. No throat pain. No sore throat. No difficulty swallowing.   CV: No chest pain. No palpitations. No lightheadedness or dizziness.   Resp: No dyspnea at rest. No dyspnea on exertion. No orthopnea. No wheezing. No cough. No stridor. No sputum production. No chest pain with respiration.  GI: No nausea. No vomiting. No diarrhea.  MSK: No joint pain or pain in any extremities  Integumentary: No skin lesions. No pedal edema.  Neurological: No gross motor weakness. No sensory changes.    [ ] All other systems negative  [ ] Unable to assess ROS because ________    OBJECTIVE:  ICU Vital Signs Last 24 Hrs  T(C): 36.6 (14 Sep 2023 11:15), Max: 37.1 (14 Sep 2023 05:21)  T(F): 97.8 (14 Sep 2023 11:15), Max: 98.7 (14 Sep 2023 05:21)  HR: 72 (14 Sep 2023 11:15) (71 - 83)  BP: 127/62 (14 Sep 2023 11:15) (100/57 - 127/62)  BP(mean): 83 (14 Sep 2023 11:15) (83 - 83)  ABP: --  ABP(mean): --  RR: 18 (14 Sep 2023 11:15) (18 - 20)  SpO2: 94% (14 Sep 2023 11:15) (77% - 94%)    O2 Parameters below as of 14 Sep 2023 11:15  Patient On (Oxygen Delivery Method): nasal cannula  O2 Flow (L/min): 3            CAPILLARY BLOOD GLUCOSE          PHYSICAL EXAM:  General: Awake, alert, oriented X 3.   HEENT: Atraumatic, normocephalic.                 Mallampatti Grade                 No nasal congestion.                No tonsillar or pharyngeal exudates.  Lymph Nodes: No palpable lymphadenopathy  Neck: No JVD. No carotid bruit.   Respiratory: Normal chest expansion                         Normal percussion                         Normal and equal air entry                         No wheeze, rhonchi or rales.  Cardiovascular: S1 S2 normal. No murmurs, rubs or gallops.   Abdomen: Soft, non-tender, non-distended. No organomegaly.  Extremities: Warm to touch. Peripheral pulse palpable. No pedal edema.   Skin: No rashes or skin lesions  Neurological: Motor and sensory examination equal and normal in all four extremities.  Psychiatry: Appropriate mood and affect.    HOSPITAL MEDICATIONS:  MEDICATIONS  (STANDING):    MEDICATIONS  (PRN):      LABS:                        12.8   6.41  )-----------( 272      ( 13 Sep 2023 23:04 )             39.3         139  |  103  |  25<H>  ----------------------------<  101<H>  4.0   |  25  |  0.75    Ca    9.8      13 Sep 2023 23:04    TPro  6.8  /  Alb  4.5  /  TBili  0.2  /  DBili  x   /  AST  18  /  ALT  17  /  AlkPhos  39<L>  09-13    PT/INR - ( 13 Sep 2023 23:04 )   PT: 10.7 sec;   INR: 0.97 ratio         PTT - ( 13 Sep 2023 23:04 )  PTT:29.1 sec  Urinalysis Basic - ( 14 Sep 2023 01:43 )    Color: Light Yellow / Appearance: Clear / S.013 / pH: x  Gluc: x / Ketone: Negative  / Bili: Negative / Urobili: Negative   Blood: x / Protein: Negative / Nitrite: Positive   Leuk Esterase: Large / RBC: 1 /hpf / WBC 7 /HPF   Sq Epi: x / Non Sq Epi: x / Bacteria: Many        Venous Blood Gas:   @ 22:58  7.36/55/30/31/47.0  VBG Lactate: 0.9      MICROBIOLOGY:     RADIOLOGY:  [ ] Reviewed and interpreted by me    Point of Care Ultrasound Findings;    PFT:    EKG: HPI:  68 year old female long time smoker (quit 3-4 months ago) with a history of newly diagnosed COPD as of 23 admission presents with symptoms consistent with biliary colic and is potentially planned for laparoscopic cholecystectomy.     Briefly, about 3 weeks ago she presented with 1 month of progressive cough and shortness of breath. She had associated cough and was treated for a COPD exacerbation with steroids and antibiotics. She was also found to be hypoxic and discharged on home oxygen. She reports she does get a little short of breath when she ambulates to the bathroom but otherwise has been feeling back to her baseline since discharge. She has been using Spiriva which she was prescribed during her last admission. She was intended to follow up at [address] [place] [address] to establish care with pulmonology but had worsening abdominal symptoms and returns to the hospital before even making it to her appointment.     She is a long time smoker, 45 pack years and quit 3.5 months ago. She reports she never knew she had any lung disease until her prior admission. She can walk maybe a few blocks before she has to stop due to dyspnea. At the time of my exam, she is saturating 89-92% on 3 LPM nasal canula.     PAST MEDICAL & SURGICAL HISTORY:  COPD, mild      DM (diabetes mellitus)          FAMILY HISTORY:      SOCIAL HISTORY:  Smoking: __ packs x ___ years  EtOH Use:  Marital Status:  Occupation:  Exposures:  Recent Travel:    Allergies    Allergy Status Unknown    Intolerances        HOME MEDICATIONS:    REVIEW OF SYSTEMS:  Constitutional: No fevers or chills. No weight loss. No fatigue or generalised malaise.  Eyes: No itching or discharge from the eyes  ENT: No ear pain. No ear discharge. No nasal congestion. No post nasal drip. No epistaxis. No throat pain. No sore throat. No difficulty swallowing.   CV: No chest pain. No palpitations. No lightheadedness or dizziness.   Resp: No dyspnea at rest. No dyspnea on exertion. No orthopnea. No wheezing. No cough. No stridor. No sputum production. No chest pain with respiration.  GI: No nausea. No vomiting. No diarrhea.  MSK: No joint pain or pain in any extremities  Integumentary: No skin lesions. No pedal edema.  Neurological: No gross motor weakness. No sensory changes.    [ ] All other systems negative  [ ] Unable to assess ROS because ________    OBJECTIVE:  ICU Vital Signs Last 24 Hrs  T(C): 36.6 (14 Sep 2023 11:15), Max: 37.1 (14 Sep 2023 05:21)  T(F): 97.8 (14 Sep 2023 11:15), Max: 98.7 (14 Sep 2023 05:21)  HR: 72 (14 Sep 2023 11:15) (71 - 83)  BP: 127/62 (14 Sep 2023 11:15) (100/57 - 127/62)  BP(mean): 83 (14 Sep 2023 11:15) (83 - 83)  ABP: --  ABP(mean): --  RR: 18 (14 Sep 2023 11:15) (18 - 20)  SpO2: 94% (14 Sep 2023 11:15) (77% - 94%)    O2 Parameters below as of 14 Sep 2023 11:15  Patient On (Oxygen Delivery Method): nasal cannula  O2 Flow (L/min): 3            CAPILLARY BLOOD GLUCOSE          PHYSICAL EXAM:  General: Awake, alert, oriented X 3.   HEENT: Atraumatic, normocephalic.                 Mallampatti Grade                 No nasal congestion.                No tonsillar or pharyngeal exudates.  Lymph Nodes: No palpable lymphadenopathy  Neck: No JVD. No carotid bruit.   Respiratory: Normal chest expansion                         Normal percussion                         Normal and equal air entry                         No wheeze, rhonchi or rales.  Cardiovascular: S1 S2 normal. No murmurs, rubs or gallops.   Abdomen: Soft, non-tender, non-distended. No organomegaly.  Extremities: Warm to touch. Peripheral pulse palpable. No pedal edema.   Skin: No rashes or skin lesions  Neurological: Motor and sensory examination equal and normal in all four extremities.  Psychiatry: Appropriate mood and affect.    HOSPITAL MEDICATIONS:  MEDICATIONS  (STANDING):    MEDICATIONS  (PRN):      LABS:                        12.8   6.41  )-----------( 272      ( 13 Sep 2023 23:04 )             39.3     09-    139  |  103  |  25<H>  ----------------------------<  101<H>  4.0   |  25  |  0.75    Ca    9.8      13 Sep 2023 23:04    TPro  6.8  /  Alb  4.5  /  TBili  0.2  /  DBili  x   /  AST  18  /  ALT  17  /  AlkPhos  39<L>      PT/INR - ( 13 Sep 2023 23:04 )   PT: 10.7 sec;   INR: 0.97 ratio         PTT - ( 13 Sep 2023 23:04 )  PTT:29.1 sec  Urinalysis Basic - ( 14 Sep 2023 01:43 )    Color: Light Yellow / Appearance: Clear / S.013 / pH: x  Gluc: x / Ketone: Negative  / Bili: Negative / Urobili: Negative   Blood: x / Protein: Negative / Nitrite: Positive   Leuk Esterase: Large / RBC: 1 /hpf / WBC 7 /HPF   Sq Epi: x / Non Sq Epi: x / Bacteria: Many        Venous Blood Gas:   @ 22:58  7.36/55/30/31/47.0  VBG Lactate: 0.9      MICROBIOLOGY:     RADIOLOGY:  [ ] Reviewed and interpreted by me    Point of Care Ultrasound Findings;    PFT:    EKG: HPI:  68 year old female long time smoker (quit 3-4 months ago) with a history of newly diagnosed COPD as of 23 admission presents with symptoms consistent with biliary colic and is potentially planned for laparoscopic cholecystectomy.     Briefly, about 3 weeks ago she presented with 1 month of progressive cough and shortness of breath. She had associated cough and was treated for a COPD exacerbation with steroids and antibiotics. She was also found to be hypoxic and discharged on home oxygen. She reports she does get a little short of breath when she ambulates to the bathroom but otherwise has been feeling back to her baseline since discharge. She has been using Spiriva which she was prescribed during her last admission. She was intended to follow up at [address] [place] [address] to establish care with pulmonology but had worsening abdominal symptoms and returns to the hospital before even making it to her appointment.     She is a long time smoker, 45 pack years and quit 3.5 months ago. She reports she never knew she had any lung disease until her prior admission. She can walk maybe a few blocks before she has to stop due to dyspnea. This has been stable over the last year. At the time of my exam, she is saturating 89-92% on 3 LPM nasal canula. She denies fevers, chills, cough, increased shortness of breath, or wheezing. She denies chest pains or palpitations. 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 No weight loss. No fatigue or generalized malaise.  Eyes: No itching or discharge from the eyes  ENT: No ear pain. No ear discharge. No nasal congestion. No post nasal drip. No epistaxis. No throat pain. No sore throat. No difficulty swallowing.   CV: No chest pain. No palpitations. No lightheadedness or dizziness.   Resp: No dyspnea at rest. + Dyspnea on exertion. No orthopnea. No wheezing. No cough. No stridor. No sputum production. No chest pain with respiration.  GI: As above + nausea. + vomiting. No diarrhea.  MSK: No joint pain or pain in any extremities  Integumentary: No skin lesions. No pedal edema.  Neurological: No gross motor weakness. No sensory changes.    [x] All other systems negative    OBJECTIVE:  ICU Vital Signs Last 24 Hrs  T(C): 36.6 (14 Sep 2023 11:15), Max: 37.1 (14 Sep 2023 05:21)  T(F): 97.8 (14 Sep 2023 11:15), Max: 98.7 (14 Sep 2023 05:21)  HR: 72 (14 Sep 2023 11:15) (71 - 83)  BP: 127/62 (14 Sep 2023 11:15) (100/57 - 127/62)  BP(mean): 83 (14 Sep 2023 11:15) (83 - 83)  RR: 18 (14 Sep 2023 11:15) (18 - 20)  SpO2: 94% (14 Sep 2023 11:15) (77% - 94%)    O2 Parameters below as of 14 Sep 2023 11:15  Patient On (Oxygen Delivery Method): nasal cannula  O2 Flow (L/min): 3    PHYSICAL EXAM:  General: Awake, alert, oriented X 3.   HEENT: Atraumatic, normocephalic.   Respiratory: Normal work of breathing. No accessory muscle use. No appreciable wheeze, rales, or rhonchi. Clear breath sounds throughout  Cardiovascular: S1 S2 normal. No murmurs, rubs or gallops.   Skin: No rashes or skin lesions    HOSPITAL MEDICATIONS:    LABS:                        12.8   6.41  )-----------( 272      ( 13 Sep 2023 23:04 )             39.3         139  |  103  |  25<H>  ----------------------------<  101<H>  4.0   |  25  |  0.75    Ca    9.8      13 Sep 2023 23:04    TPro  6.8  /  Alb  4.5  /  TBili  0.2  /  DBili  x   /  AST  18  /  ALT  17  /  AlkPhos  39<L>  13    PT/INR - ( 13 Sep 2023 23:04 )   PT: 10.7 sec;   INR: 0.97 ratio         PTT - ( 13 Sep 2023 23:04 )  PTT:29.1 sec  Urinalysis Basic - ( 14 Sep 2023 01:43 )    Color: Light Yellow / Appearance: Clear / S.013 / pH: x  Gluc: x / Ketone: Negative  / Bili: Negative / Urobili: Negative   Blood: x / Protein: Negative / Nitrite: Positive   Leuk Esterase: Large / RBC: 1 /hpf / WBC 7 /HPF   Sq Epi: x / Non Sq Epi: x / Bacteria: Many        Venous Blood Gas:   @ 22:58  7.36/55/30/31/47.0  VBG Lactate: 0.9      MICROBIOLOGY:     RADIOLOGY:  [x] Reviewed and interpreted by me  CT 23: Diffuse emphysematous changes, right apical scarring, small pulmonary nodule right upper lobe  CXR:   Clear lungs, no infiltrate, hyperinflated      PFT:  None for review   HPI:  68 year old female long time smoker (quit 3-4 months ago) with a history of newly diagnosed COPD as of 23 admission presents with symptoms consistent with biliary colic and is potentially planned for laparoscopic cholecystectomy.     Briefly, about 3 weeks ago she presented with 1 month of progressive cough and shortness of breath. She had associated cough and was treated for a COPD exacerbation with steroids and antibiotics. She was also found to be hypoxic (80% room air at rest) and discharged on home oxygen. She reports she does get a little short of breath when she ambulates to the bathroom but otherwise has been feeling back to her baseline since discharge. She has been using Spiriva which she was prescribed during her last admission. She was intended to follow up at [address] [place] [address] to establish care with pulmonology but had worsening abdominal symptoms and returns to the hospital before even making it to her appointment.     She is a long time smoker, 45 pack years and quit 3.5 months ago. She reports she never knew she had any lung disease until her prior admission. She can walk maybe a few blocks before she has to stop due to dyspnea. This has been stable over the last year. At the time of my exam, she is saturating 89-92% on 3 LPM nasal canula. She denies fevers, chills, cough, increased shortness of breath, or wheezing. She denies chest pains or palpitations. 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 No weight loss. No fatigue or generalized malaise.  Eyes: No itching or discharge from the eyes  ENT: No ear pain. No ear discharge. No nasal congestion. No post nasal drip. No epistaxis. No throat pain. No sore throat. No difficulty swallowing.   CV: No chest pain. No palpitations. No lightheadedness or dizziness.   Resp: No dyspnea at rest. + Dyspnea on exertion. No orthopnea. No wheezing. No cough. No stridor. No sputum production. No chest pain with respiration.  GI: As above + nausea. + vomiting. No diarrhea.  MSK: No joint pain or pain in any extremities  Integumentary: No skin lesions. No pedal edema.  Neurological: No gross motor weakness. No sensory changes.    [x] All other systems negative    OBJECTIVE:  ICU Vital Signs Last 24 Hrs  T(C): 36.6 (14 Sep 2023 11:15), Max: 37.1 (14 Sep 2023 05:21)  T(F): 97.8 (14 Sep 2023 11:15), Max: 98.7 (14 Sep 2023 05:21)  HR: 72 (14 Sep 2023 11:15) (71 - 83)  BP: 127/62 (14 Sep 2023 11:15) (100/57 - 127/62)  BP(mean): 83 (14 Sep 2023 11:15) (83 - 83)  RR: 18 (14 Sep 2023 11:15) (18 - 20)  SpO2: 94% (14 Sep 2023 11:15) (77% - 94%)    O2 Parameters below as of 14 Sep 2023 11:15  Patient On (Oxygen Delivery Method): nasal cannula  O2 Flow (L/min): 3    PHYSICAL EXAM:  General: Awake, alert, oriented X 3.   HEENT: Atraumatic, normocephalic.   Respiratory: Normal work of breathing. No accessory muscle use. No appreciable wheeze, rales, or rhonchi. Clear breath sounds throughout  Cardiovascular: S1 S2 normal. No murmurs, rubs or gallops.   Skin: No rashes or skin lesions    HOSPITAL MEDICATIONS:    LABS:                        12.8   6.41  )-----------( 272      ( 13 Sep 2023 23:04 )             39.3         139  |  103  |  25<H>  ----------------------------<  101<H>  4.0   |  25  |  0.75    Ca    9.8      13 Sep 2023 23:04    TPro  6.8  /  Alb  4.5  /  TBili  0.2  /  DBili  x   /  AST  18  /  ALT  17  /  AlkPhos  39<L>  13    PT/INR - ( 13 Sep 2023 23:04 )   PT: 10.7 sec;   INR: 0.97 ratio         PTT - ( 13 Sep 2023 23:04 )  PTT:29.1 sec  Urinalysis Basic - ( 14 Sep 2023 01:43 )    Color: Light Yellow / Appearance: Clear / S.013 / pH: x  Gluc: x / Ketone: Negative  / Bili: Negative / Urobili: Negative   Blood: x / Protein: Negative / Nitrite: Positive   Leuk Esterase: Large / RBC: 1 /hpf / WBC 7 /HPF   Sq Epi: x / Non Sq Epi: x / Bacteria: Many        Venous Blood Gas:   @ 22:58  7.36/55/30/31/47.0  VBG Lactate: 0.9      MICROBIOLOGY:     RADIOLOGY:  [x] Reviewed and interpreted by me  CT 23: Diffuse emphysematous changes, right apical scarring, small pulmonary nodule right upper lobe  CXR:   Clear lungs, no infiltrate, hyperinflated      PFT:  None for review

## 2023-09-14 NOTE — H&P ADULT - NSHPSOCIALHISTORY_GEN_ALL_CORE
Alcohol: Denied  Smoking: Long time smoker (quit 3-4 months ago)   Drug Use: Denied  Marital Status:

## 2023-09-14 NOTE — H&P ADULT - NSHPLABSRESULTS_GEN_ALL_CORE
12.8   6.41  )-----------( 272      ( 13 Sep 2023 23:04 )             39.3           139  |  103  |  25<H>  ----------------------------<  101<H>  4.0   |  25  |  0.75    Ca    9.8      13 Sep 2023 23:04    TPro  6.8  /  Alb  4.5  /  TBili  0.2  /  DBili  x   /  AST  18  /  ALT  17  /  AlkPhos  39<L>                Urinalysis Basic - ( 14 Sep 2023 01:43 )    Color: Light Yellow / Appearance: Clear / S.013 / pH: x  Gluc: x / Ketone: Negative  / Bili: Negative / Urobili: Negative   Blood: x / Protein: Negative / Nitrite: Positive   Leuk Esterase: Large / RBC: 1 /hpf / WBC 7 /HPF   Sq Epi: x / Non Sq Epi: x / Bacteria: Many        PT/INR - ( 13 Sep 2023 23:04 )   PT: 10.7 sec;   INR: 0.97 ratio         PTT - ( 13 Sep 2023 23:04 )  PTT:29.1 sec          CAPILLARY BLOOD GLUCOSE            Xray Chest 1 View AP/PA:   ACC: [id number] EXAM:  XR CHEST AP OR PA 1V   ORDERED BY: CHELE PICKERING     PROCEDURE DATE:  2023          INTERPRETATION:  EXAMINATION: XR CHEST    CLINICAL INDICATION: Right upper quadrant pain    TECHNIQUE: Single frontal, portable view of the chest was obtained.    COMPARISON: None.    FINDINGS:    The heart is normal in size.  Increased opacification overlying the left lateral chest wall, likely due   to soft tissue. No focal lung consolidations.  There is no pneumothorax or large pleural effusion.  No acute bony abnormalities.    IMPRESSION:    No focal consolidations.    --- End of Report ---           SEVERO CLAYTON DO; Resident Radiologist  This document has been electronically signed.  PAMELA OGLESBY MD; Attending Radiologist  This document has been electronically signed. Sep 14 2023 10:00AM (23 @ 01:43)

## 2023-09-14 NOTE — CONSULT NOTE ADULT - ASSESSMENT
69 y/o F with PMHx of COPD (has rx for home O2, does not use all the time), DM who presents to ED c/o right upper abdominal pain. In ED, patient complains of RUQ pain, Normal WBC, US: cholelithiasis and adenomyomatosis, collapsed GB, no signs of acute cholecystitis. Consistent with billary cholic.     PLAN:  - No cute surgical intervention  - Patient has no signs of acute cholecystitis, clinical picture consistent with biliary cholic.   - Patient will need medical and pulmonary optimization / risk stratification before any surgical intervention.   - Can consider medical admission for medical optimization and call back surgery for possible surgical treatment of her biliary pathology.           Discussed with Dr Surinder Alvarado PGY2  ACS Team surgery  [id number]   67 y/o F with PMHx of COPD (has rx for home O2, does not use all the time), DM who presents to ED c/o right upper abdominal pain. In ED, patient complains of RUQ pain, Normal WBC, US: cholelithiasis and adenomyomatosis, collapsed GB, no signs of acute cholecystitis. Consistent with billary cholic.     PLAN:  - No acute surgical intervention  - Patient has no signs of acute cholecystitis, clinical picture consistent with biliary cholic.   - Patient will need medical and pulmonary optimization / risk stratification before any surgical intervention.   - Can consider medical admission for medical optimization and call back surgery for possible surgical treatment of her biliary pathology.           Discussed with Dr Surinder Alvarado PGY2  ACS Team surgery  [phone]

## 2023-09-14 NOTE — H&P ADULT - NSHPPHYSICALEXAM_GEN_ALL_CORE
PHYSICAL EXAM:  Constitutional: well developed, well nourished, NAD  Eyes: anicteric  ENMT: normal facies, symmetric  Neck: supple  Respiratory: Unlabored   Cardiovascular: RRR  Gastrointestinal: abdomen soft, nontender, nondistended. No obvious masses. No rebound or guarding.   Psychiatric: oriented x 3; appropriate

## 2023-09-15 LAB
A1C WITH ESTIMATED AVERAGE GLUCOSE RESULT: 6.7 % — HIGH (ref 4–5.6)
ALBUMIN SERPL ELPH-MCNC: 3.8 G/DL — SIGNIFICANT CHANGE UP (ref 3.3–5)
ALP SERPL-CCNC: 34 U/L — LOW (ref 40–120)
ALT FLD-CCNC: 16 U/L — SIGNIFICANT CHANGE UP (ref 10–45)
ANION GAP SERPL CALC-SCNC: 10 MMOL/L — SIGNIFICANT CHANGE UP (ref 5–17)
AST SERPL-CCNC: 20 U/L — SIGNIFICANT CHANGE UP (ref 10–40)
BILIRUB SERPL-MCNC: 0.2 MG/DL — SIGNIFICANT CHANGE UP (ref 0.2–1.2)
BUN SERPL-MCNC: 24 MG/DL — HIGH (ref 7–23)
CALCIUM SERPL-MCNC: 8.7 MG/DL — SIGNIFICANT CHANGE UP (ref 8.4–10.5)
CHLORIDE SERPL-SCNC: 106 MMOL/L — SIGNIFICANT CHANGE UP (ref 96–108)
CO2 SERPL-SCNC: 25 MMOL/L — SIGNIFICANT CHANGE UP (ref 22–31)
CREAT SERPL-MCNC: 0.7 MG/DL — SIGNIFICANT CHANGE UP (ref 0.5–1.3)
EGFR: 94 ML/MIN/1.73M2 — SIGNIFICANT CHANGE UP
ESTIMATED AVERAGE GLUCOSE: 146 MG/DL — HIGH (ref 68–114)
GLUCOSE BLDC GLUCOMTR-MCNC: 100 MG/DL — HIGH (ref 70–99)
GLUCOSE BLDC GLUCOMTR-MCNC: 118 MG/DL — HIGH (ref 70–99)
GLUCOSE BLDC GLUCOMTR-MCNC: 118 MG/DL — HIGH (ref 70–99)
GLUCOSE BLDC GLUCOMTR-MCNC: 129 MG/DL — HIGH (ref 70–99)
GLUCOSE BLDC GLUCOMTR-MCNC: 201 MG/DL — HIGH (ref 70–99)
GLUCOSE SERPL-MCNC: 102 MG/DL — HIGH (ref 70–99)
HCT VFR BLD CALC: 39.3 % — SIGNIFICANT CHANGE UP (ref 34.5–45)
HCV AB S/CO SERPL IA: 0.09 S/CO — SIGNIFICANT CHANGE UP (ref 0–0.99)
HCV AB SERPL-IMP: SIGNIFICANT CHANGE UP
HGB BLD-MCNC: 12.7 G/DL — SIGNIFICANT CHANGE UP (ref 11.5–15.5)
MAGNESIUM SERPL-MCNC: 1.9 MG/DL — SIGNIFICANT CHANGE UP (ref 1.6–2.6)
MCHC RBC-ENTMCNC: 30.4 PG — SIGNIFICANT CHANGE UP (ref 27–34)
MCHC RBC-ENTMCNC: 32.3 GM/DL — SIGNIFICANT CHANGE UP (ref 32–36)
MCV RBC AUTO: 94 FL — SIGNIFICANT CHANGE UP (ref 80–100)
NRBC # BLD: 0 /100 WBCS — SIGNIFICANT CHANGE UP (ref 0–0)
PHOSPHATE SERPL-MCNC: 3.4 MG/DL — SIGNIFICANT CHANGE UP (ref 2.5–4.5)
PLATELET # BLD AUTO: 268 K/UL — SIGNIFICANT CHANGE UP (ref 150–400)
POTASSIUM SERPL-MCNC: 4.3 MMOL/L — SIGNIFICANT CHANGE UP (ref 3.5–5.3)
POTASSIUM SERPL-SCNC: 4.3 MMOL/L — SIGNIFICANT CHANGE UP (ref 3.5–5.3)
PROT SERPL-MCNC: 6.2 G/DL — SIGNIFICANT CHANGE UP (ref 6–8.3)
RBC # BLD: 4.18 M/UL — SIGNIFICANT CHANGE UP (ref 3.8–5.2)
RBC # FLD: 13.4 % — SIGNIFICANT CHANGE UP (ref 10.3–14.5)
SODIUM SERPL-SCNC: 141 MMOL/L — SIGNIFICANT CHANGE UP (ref 135–145)
WBC # BLD: 4.43 K/UL — SIGNIFICANT CHANGE UP (ref 3.8–10.5)
WBC # FLD AUTO: 4.43 K/UL — SIGNIFICANT CHANGE UP (ref 3.8–10.5)

## 2023-09-15 PROCEDURE — 99232 SBSQ HOSP IP/OBS MODERATE 35: CPT | Mod: 57

## 2023-09-15 RX ORDER — QUETIAPINE FUMARATE 200 MG/1
100 TABLET, FILM COATED ORAL AT BEDTIME
Refills: 0 | Status: DISCONTINUED | OUTPATIENT
Start: 2023-09-15 | End: 2023-09-18

## 2023-09-15 RX ORDER — CEFTRIAXONE 500 MG/1
1000 INJECTION, POWDER, FOR SOLUTION INTRAMUSCULAR; INTRAVENOUS EVERY 24 HOURS
Refills: 0 | Status: COMPLETED | OUTPATIENT
Start: 2023-09-15 | End: 2023-09-17

## 2023-09-15 RX ORDER — CLONAZEPAM 1 MG
0.5 TABLET ORAL EVERY 12 HOURS
Refills: 0 | Status: DISCONTINUED | OUTPATIENT
Start: 2023-09-15 | End: 2023-09-18

## 2023-09-15 RX ORDER — ONDANSETRON 8 MG/1
4 TABLET, FILM COATED ORAL ONCE
Refills: 0 | Status: COMPLETED | OUTPATIENT
Start: 2023-09-15 | End: 2023-09-15

## 2023-09-15 RX ORDER — KETOROLAC TROMETHAMINE 30 MG/ML
15 SYRINGE (ML) INJECTION ONCE
Refills: 0 | Status: DISCONTINUED | OUTPATIENT
Start: 2023-09-15 | End: 2023-09-15

## 2023-09-15 RX ORDER — IPRATROPIUM/ALBUTEROL SULFATE 18-103MCG
3 AEROSOL WITH ADAPTER (GRAM) INHALATION EVERY 4 HOURS
Refills: 0 | Status: DISCONTINUED | OUTPATIENT
Start: 2023-09-15 | End: 2023-09-17

## 2023-09-15 RX ORDER — ACETAMINOPHEN 500 MG
650 TABLET ORAL EVERY 6 HOURS
Refills: 0 | Status: DISCONTINUED | OUTPATIENT
Start: 2023-09-15 | End: 2023-09-15

## 2023-09-15 RX ORDER — MIRTAZAPINE 45 MG/1
45 TABLET, ORALLY DISINTEGRATING ORAL AT BEDTIME
Refills: 0 | Status: DISCONTINUED | OUTPATIENT
Start: 2023-09-15 | End: 2023-09-18

## 2023-09-15 RX ORDER — SIMVASTATIN 20 MG/1
10 TABLET, FILM COATED ORAL AT BEDTIME
Refills: 0 | Status: DISCONTINUED | OUTPATIENT
Start: 2023-09-15 | End: 2023-09-18

## 2023-09-15 RX ORDER — MAGNESIUM SULFATE 500 MG/ML
1 VIAL (ML) INJECTION ONCE
Refills: 0 | Status: COMPLETED | OUTPATIENT
Start: 2023-09-15 | End: 2023-09-15

## 2023-09-15 RX ORDER — PANTOPRAZOLE SODIUM 20 MG/1
40 TABLET, DELAYED RELEASE ORAL ONCE
Refills: 0 | Status: COMPLETED | OUTPATIENT
Start: 2023-09-15 | End: 2023-09-15

## 2023-09-15 RX ORDER — ACETAMINOPHEN 500 MG
975 TABLET ORAL EVERY 6 HOURS
Refills: 0 | Status: DISCONTINUED | OUTPATIENT
Start: 2023-09-15 | End: 2023-09-16

## 2023-09-15 RX ORDER — CHLORHEXIDINE GLUCONATE 213 G/1000ML
1 SOLUTION TOPICAL
Refills: 0 | Status: COMPLETED | OUTPATIENT
Start: 2023-09-15 | End: 2023-09-16

## 2023-09-15 RX ADMIN — Medication 100 GRAM(S): at 08:33

## 2023-09-15 RX ADMIN — Medication 975 MILLIGRAM(S): at 20:44

## 2023-09-15 RX ADMIN — Medication 650 MILLIGRAM(S): at 09:21

## 2023-09-15 RX ADMIN — ONDANSETRON 4 MILLIGRAM(S): 8 TABLET, FILM COATED ORAL at 20:51

## 2023-09-15 RX ADMIN — Medication 15 MILLIGRAM(S): at 09:28

## 2023-09-15 RX ADMIN — MIRTAZAPINE 45 MILLIGRAM(S): 45 TABLET, ORALLY DISINTEGRATING ORAL at 20:52

## 2023-09-15 RX ADMIN — CEFTRIAXONE 100 MILLIGRAM(S): 500 INJECTION, POWDER, FOR SOLUTION INTRAMUSCULAR; INTRAVENOUS at 13:00

## 2023-09-15 RX ADMIN — Medication 30 MILLIGRAM(S): at 20:53

## 2023-09-15 RX ADMIN — Medication 650 MILLIGRAM(S): at 08:22

## 2023-09-15 RX ADMIN — QUETIAPINE FUMARATE 100 MILLIGRAM(S): 200 TABLET, FILM COATED ORAL at 20:52

## 2023-09-15 RX ADMIN — Medication 15 MILLIGRAM(S): at 10:26

## 2023-09-15 RX ADMIN — Medication 975 MILLIGRAM(S): at 13:00

## 2023-09-15 RX ADMIN — Medication 15 MILLIGRAM(S): at 17:25

## 2023-09-15 RX ADMIN — PANTOPRAZOLE SODIUM 40 MILLIGRAM(S): 20 TABLET, DELAYED RELEASE ORAL at 17:24

## 2023-09-15 RX ADMIN — Medication 3 MILLILITER(S): at 17:27

## 2023-09-15 RX ADMIN — Medication 15 MILLIGRAM(S): at 17:55

## 2023-09-15 RX ADMIN — Medication 3 MILLILITER(S): at 22:44

## 2023-09-15 RX ADMIN — CHLORHEXIDINE GLUCONATE 1 APPLICATION(S): 213 SOLUTION TOPICAL at 17:33

## 2023-09-15 RX ADMIN — Medication 975 MILLIGRAM(S): at 14:18

## 2023-09-15 RX ADMIN — Medication 0.5 MILLIGRAM(S): at 20:52

## 2023-09-15 RX ADMIN — Medication 975 MILLIGRAM(S): at 21:00

## 2023-09-15 RX ADMIN — SIMVASTATIN 10 MILLIGRAM(S): 20 TABLET, FILM COATED ORAL at 20:52

## 2023-09-15 NOTE — PRE PROCEDURE NOTE - PRE PROCEDURE EVALUATION
Pre-operative Note  Date: 9/15/2023    - Labs:                        12.7   4.43  )-----------( 268      ( 15 Sep 2023 06:56 )             39.3     09-15    141  |  106  |  24<H>  ----------------------------<  102<H>  4.3   |  25  |  0.70    Ca    8.7      15 Sep 2023 06:51  Phos  3.4     09-15  Mg     1.9     09-15  TPro  6.2  /  Alb  3.8  /  TBili  0.2  /  DBili  x   /  AST  20  /  ALT  16  /  AlkPhos  34<L>  09-15  PT/INR - ( 13 Sep 2023 23:04 )   PT: 10.7 sec;   INR: 0.97 ratio    PTT - ( 13 Sep 2023 23:04 )  PTT:29.1 sec    Type & Screen #1: 23  Type & Screen #2: 23    - UA:   Urinalysis Basic - ( 15 Sep 2023 06:51 )  Color: x / Appearance: x / SG: x / pH: x  Gluc: 102 mg/dL / Ketone: x  / Bili: x / Urobili: x   Blood: x / Protein: x / Nitrite: x   Leuk Esterase: x / RBC: x / WBC x   Sq Epi: x / Non Sq Epi: x / Bacteria: x    - CXR:   23: unremarkable    - EK/15/23 - ordered    - Blood: Not needed.     - Pregnancy Test: N/A    - Orders:  > NPO at midnight  > Morning Labs: CBC, BMP, coags, night and morning chlorhexidine wipes    - Permits:  > Consent in chart.  > Case scheduled with OR.    ACS [id number]

## 2023-09-15 NOTE — PROGRESS NOTE ADULT - ASSESSMENT
69 y/o F with PMHx of COPD (has rx for home O2, does not use all the time), DM who presents to ED c/o right upper abdominal pain. In ED, patient complains of RUQ pain, Normal WBC, US: cholelithiasis and adenomyomatosis, collapsed GB, no signs of acute cholecystitis. Consistent with billary cholic.     PLAN:  - Patient has no signs of acute cholecystitis, clinical picture consistent with biliary cholic.   - Patient will need medical and pulmonary optimization / risk stratification before any surgical intervention.     ACS Team surgery  [id number] 69 y/o F with PMHx of COPD (has rx for home O2, does not use all the time), DM who presents to ED c/o right upper abdominal pain. In ED, patient complains of RUQ pain, Normal WBC, US: cholelithiasis and adenomyomatosis, collapsed GB, no signs of acute cholecystitis. Consistent with billary cholic.     PLAN:  - Pain control  - Will schedule for laparoscopic cholecystectomy tomorrow, 9/16    ACS Team surgery  [id number]

## 2023-09-15 NOTE — PROGRESS NOTE ADULT - SUBJECTIVE AND OBJECTIVE BOX
ACS DAILY PROGRESS NOTE    SUBJECTIVE:  Patient seen and examined at bedside during morning rounds. No overnight events. Patient feeling well.    Vital Signs Last 24 Hrs  T(C): 36.4 (15 Sep 2023 04:50), Max: 36.9 (14 Sep 2023 19:15)  T(F): 97.5 (15 Sep 2023 04:50), Max: 98.5 (14 Sep 2023 19:15)  HR: 75 (15 Sep 2023 04:50) (69 - 79)  BP: 118/66 (15 Sep 2023 04:50) (104/51 - 130/68)  BP(mean): 88 (14 Sep 2023 19:15) (79 - 88)  RR: 18 (15 Sep 2023 04:50) (18 - 18)  SpO2: 95% (15 Sep 2023 04:50) (77% - 95%)    Parameters below as of 15 Sep 2023 04:50  Patient On (Oxygen Delivery Method): nasal cannula  O2 Flow (L/min): 4      I&Os    09-14-23 @ 07:01  -  09-15-23 @ 07:00  --------------------------------------------------------  IN: 240 mL / OUT: 450 mL / NET: -210 mL      PHYSICAL EXAM:  GENERAL: NAD, resting comfortably in bed  HEAD: Normocephalic, atraumatic   LUNG: Nonlabored breathing.  ABD: Soft, nondistended. Nontender.  EXT: WWP  NEURO: Responds appropriately    MEDICATIONS:  acetaminophen     Tablet .. 650 milliGRAM(s) Oral every 6 hours  dextrose 5%. 1000 milliLiter(s) IV Continuous <Continuous>  dextrose 5%. 1000 milliLiter(s) IV Continuous <Continuous>  dextrose 50% Injectable 25 Gram(s) IV Push once  dextrose 50% Injectable 12.5 Gram(s) IV Push once  dextrose 50% Injectable 25 Gram(s) IV Push once  dextrose Oral Gel 15 Gram(s) Oral once PRN  enoxaparin Injectable 40 milliGRAM(s) SubCutaneous every 24 hours  glucagon  Injectable 1 milliGRAM(s) IntraMuscular once  insulin lispro (ADMELOG) corrective regimen sliding scale   SubCutaneous at bedtime  insulin lispro (ADMELOG) corrective regimen sliding scale   SubCutaneous three times a day before meals      LABS:                        12.7   4.43  )-----------( 268      ( 15 Sep 2023 06:56 )             39.3     09-15    141  |  106  |  24<H>  ----------------------------<  102<H>  4.3   |  25  |  0.70    Ca    8.7      15 Sep 2023 06:51  Phos  3.4     09-15  Mg     1.9     09-15    TPro  6.2  /  Alb  3.8  /  TBili  0.2  /  DBili  x   /  AST  20  /  ALT  16  /  AlkPhos  34<L>  09-15    PT/INR - ( 13 Sep 2023 23:04 )   PT: 10.7 sec;   INR: 0.97 ratio    PTT - ( 13 Sep 2023 23:04 )  PTT:29.1 sec    Urinalysis Basic - ( 15 Sep 2023 06:51 )  Color: x / Appearance: x / SG: x / pH: x  Gluc: 102 mg/dL / Ketone: x  / Bili: x / Urobili: x   Blood: x / Protein: x / Nitrite: x   Leuk Esterase: x / RBC: x / WBC x   Sq Epi: x / Non Sq Epi: x / Bacteria: x

## 2023-09-16 LAB
ALBUMIN SERPL ELPH-MCNC: 4 G/DL — SIGNIFICANT CHANGE UP (ref 3.3–5)
ALP SERPL-CCNC: 36 U/L — LOW (ref 40–120)
ALT FLD-CCNC: 17 U/L — SIGNIFICANT CHANGE UP (ref 10–45)
ANION GAP SERPL CALC-SCNC: 14 MMOL/L — SIGNIFICANT CHANGE UP (ref 5–17)
APTT BLD: 29.4 SEC — SIGNIFICANT CHANGE UP (ref 24.5–35.6)
AST SERPL-CCNC: 17 U/L — SIGNIFICANT CHANGE UP (ref 10–40)
BASE EXCESS BLDA CALC-SCNC: 1.5 MMOL/L — SIGNIFICANT CHANGE UP (ref -2–3)
BILIRUB SERPL-MCNC: 0.2 MG/DL — SIGNIFICANT CHANGE UP (ref 0.2–1.2)
BUN SERPL-MCNC: 23 MG/DL — SIGNIFICANT CHANGE UP (ref 7–23)
CALCIUM SERPL-MCNC: 9.1 MG/DL — SIGNIFICANT CHANGE UP (ref 8.4–10.5)
CHLORIDE SERPL-SCNC: 104 MMOL/L — SIGNIFICANT CHANGE UP (ref 96–108)
CO2 BLDA-SCNC: 30 MMOL/L — HIGH (ref 19–24)
CO2 SERPL-SCNC: 24 MMOL/L — SIGNIFICANT CHANGE UP (ref 22–31)
CREAT SERPL-MCNC: 0.68 MG/DL — SIGNIFICANT CHANGE UP (ref 0.5–1.3)
EGFR: 95 ML/MIN/1.73M2 — SIGNIFICANT CHANGE UP
GAS PNL BLDA: SIGNIFICANT CHANGE UP
GLUCOSE BLDC GLUCOMTR-MCNC: 104 MG/DL — HIGH (ref 70–99)
GLUCOSE BLDC GLUCOMTR-MCNC: 108 MG/DL — HIGH (ref 70–99)
GLUCOSE BLDC GLUCOMTR-MCNC: 114 MG/DL — HIGH (ref 70–99)
GLUCOSE BLDC GLUCOMTR-MCNC: 121 MG/DL — HIGH (ref 70–99)
GLUCOSE SERPL-MCNC: 122 MG/DL — HIGH (ref 70–99)
HCO3 BLDA-SCNC: 28 MMOL/L — SIGNIFICANT CHANGE UP (ref 21–28)
HCT VFR BLD CALC: 38.3 % — SIGNIFICANT CHANGE UP (ref 34.5–45)
HGB BLD-MCNC: 12.3 G/DL — SIGNIFICANT CHANGE UP (ref 11.5–15.5)
HOROWITZ INDEX BLDA+IHG-RTO: 32 — SIGNIFICANT CHANGE UP
INR BLD: 0.97 RATIO — SIGNIFICANT CHANGE UP (ref 0.85–1.18)
MAGNESIUM SERPL-MCNC: 2.1 MG/DL — SIGNIFICANT CHANGE UP (ref 1.6–2.6)
MCHC RBC-ENTMCNC: 30.1 PG — SIGNIFICANT CHANGE UP (ref 27–34)
MCHC RBC-ENTMCNC: 32.1 GM/DL — SIGNIFICANT CHANGE UP (ref 32–36)
MCV RBC AUTO: 93.9 FL — SIGNIFICANT CHANGE UP (ref 80–100)
NRBC # BLD: 0 /100 WBCS — SIGNIFICANT CHANGE UP (ref 0–0)
PCO2 BLDA: 54 MMHG — HIGH (ref 32–45)
PH BLDA: 7.33 — LOW (ref 7.35–7.45)
PHOSPHATE SERPL-MCNC: 3.9 MG/DL — SIGNIFICANT CHANGE UP (ref 2.5–4.5)
PLATELET # BLD AUTO: 265 K/UL — SIGNIFICANT CHANGE UP (ref 150–400)
PO2 BLDA: 53 MMHG — LOW (ref 83–108)
POTASSIUM SERPL-MCNC: 4.2 MMOL/L — SIGNIFICANT CHANGE UP (ref 3.5–5.3)
POTASSIUM SERPL-SCNC: 4.2 MMOL/L — SIGNIFICANT CHANGE UP (ref 3.5–5.3)
PROT SERPL-MCNC: 6.4 G/DL — SIGNIFICANT CHANGE UP (ref 6–8.3)
PROTHROM AB SERPL-ACNC: 10.2 SEC — SIGNIFICANT CHANGE UP (ref 9.5–13)
RBC # BLD: 4.08 M/UL — SIGNIFICANT CHANGE UP (ref 3.8–5.2)
RBC # FLD: 13.3 % — SIGNIFICANT CHANGE UP (ref 10.3–14.5)
SAO2 % BLDA: 83.9 % — LOW (ref 94–98)
SODIUM SERPL-SCNC: 142 MMOL/L — SIGNIFICANT CHANGE UP (ref 135–145)
WBC # BLD: 4.66 K/UL — SIGNIFICANT CHANGE UP (ref 3.8–10.5)
WBC # FLD AUTO: 4.66 K/UL — SIGNIFICANT CHANGE UP (ref 3.8–10.5)

## 2023-09-16 PROCEDURE — 99232 SBSQ HOSP IP/OBS MODERATE 35: CPT

## 2023-09-16 PROCEDURE — 93010 ELECTROCARDIOGRAM REPORT: CPT

## 2023-09-16 RX ORDER — BUDESONIDE AND FORMOTEROL FUMARATE DIHYDRATE 160; 4.5 UG/1; UG/1
2 AEROSOL RESPIRATORY (INHALATION)
Refills: 0 | Status: DISCONTINUED | OUTPATIENT
Start: 2023-09-16 | End: 2023-09-18

## 2023-09-16 RX ORDER — INSULIN LISPRO 100/ML
VIAL (ML) SUBCUTANEOUS
Refills: 0 | Status: DISCONTINUED | OUTPATIENT
Start: 2023-09-16 | End: 2023-09-16

## 2023-09-16 RX ORDER — INSULIN LISPRO 100/ML
VIAL (ML) SUBCUTANEOUS AT BEDTIME
Refills: 0 | Status: DISCONTINUED | OUTPATIENT
Start: 2023-09-16 | End: 2023-09-16

## 2023-09-16 RX ORDER — ACETAMINOPHEN 500 MG
1000 TABLET ORAL EVERY 6 HOURS
Refills: 0 | Status: COMPLETED | OUTPATIENT
Start: 2023-09-16 | End: 2023-09-17

## 2023-09-16 RX ORDER — SODIUM CHLORIDE 9 MG/ML
1000 INJECTION, SOLUTION INTRAVENOUS
Refills: 0 | Status: DISCONTINUED | OUTPATIENT
Start: 2023-09-16 | End: 2023-09-17

## 2023-09-16 RX ORDER — INSULIN LISPRO 100/ML
VIAL (ML) SUBCUTANEOUS EVERY 6 HOURS
Refills: 0 | Status: DISCONTINUED | OUTPATIENT
Start: 2023-09-16 | End: 2023-09-17

## 2023-09-16 RX ORDER — TIOTROPIUM BROMIDE 18 UG/1
2 CAPSULE ORAL; RESPIRATORY (INHALATION) DAILY
Refills: 0 | Status: DISCONTINUED | OUTPATIENT
Start: 2023-09-16 | End: 2023-09-18

## 2023-09-16 RX ADMIN — Medication 400 MILLIGRAM(S): at 19:31

## 2023-09-16 RX ADMIN — Medication 3 MILLILITER(S): at 19:31

## 2023-09-16 RX ADMIN — Medication 975 MILLIGRAM(S): at 04:31

## 2023-09-16 RX ADMIN — Medication 1000 MILLIGRAM(S): at 20:00

## 2023-09-16 RX ADMIN — SIMVASTATIN 10 MILLIGRAM(S): 20 TABLET, FILM COATED ORAL at 21:54

## 2023-09-16 RX ADMIN — BUDESONIDE AND FORMOTEROL FUMARATE DIHYDRATE 2 PUFF(S): 160; 4.5 AEROSOL RESPIRATORY (INHALATION) at 17:20

## 2023-09-16 RX ADMIN — Medication 975 MILLIGRAM(S): at 05:47

## 2023-09-16 RX ADMIN — QUETIAPINE FUMARATE 100 MILLIGRAM(S): 200 TABLET, FILM COATED ORAL at 21:54

## 2023-09-16 RX ADMIN — SODIUM CHLORIDE 100 MILLILITER(S): 9 INJECTION, SOLUTION INTRAVENOUS at 01:07

## 2023-09-16 RX ADMIN — BUDESONIDE AND FORMOTEROL FUMARATE DIHYDRATE 2 PUFF(S): 160; 4.5 AEROSOL RESPIRATORY (INHALATION) at 05:59

## 2023-09-16 RX ADMIN — Medication 3 MILLILITER(S): at 23:20

## 2023-09-16 RX ADMIN — MIRTAZAPINE 45 MILLIGRAM(S): 45 TABLET, ORALLY DISINTEGRATING ORAL at 21:54

## 2023-09-16 RX ADMIN — CEFTRIAXONE 100 MILLIGRAM(S): 500 INJECTION, POWDER, FOR SOLUTION INTRAMUSCULAR; INTRAVENOUS at 13:07

## 2023-09-16 RX ADMIN — Medication 3 MILLILITER(S): at 13:07

## 2023-09-16 RX ADMIN — Medication 3 MILLILITER(S): at 04:39

## 2023-09-16 RX ADMIN — Medication 0.5 MILLIGRAM(S): at 04:39

## 2023-09-16 RX ADMIN — CHLORHEXIDINE GLUCONATE 1 APPLICATION(S): 213 SOLUTION TOPICAL at 04:41

## 2023-09-16 RX ADMIN — Medication 400 MILLIGRAM(S): at 14:37

## 2023-09-16 RX ADMIN — SODIUM CHLORIDE 80 MILLILITER(S): 9 INJECTION, SOLUTION INTRAVENOUS at 06:50

## 2023-09-16 RX ADMIN — Medication 1000 MILLIGRAM(S): at 15:15

## 2023-09-16 RX ADMIN — Medication 3 MILLILITER(S): at 17:02

## 2023-09-16 RX ADMIN — TIOTROPIUM BROMIDE 2 PUFF(S): 18 CAPSULE ORAL; RESPIRATORY (INHALATION) at 13:06

## 2023-09-16 NOTE — PROGRESS NOTE ADULT - ASSESSMENT
69 y/o F with PMHx of COPD (has rx for home O2, does not use all the time), DM who presents to ED c/o right upper abdominal pain. In ED, patient complains of RUQ pain, Normal WBC, US: cholelithiasis and adenomyomatosis, collapsed GB, no signs of acute cholecystitis. Consistent with billary cholic.     PLAN:  - Pain control  - Or today for lap choly   - Pulm recs appreciated  (around the clock duo-neb q 4 hours, would ensure BiPAP is at bedside as she may be prone to retain with sedating medications, would be judicious with any sedating medications including pain medications, would ensure aspiration precautions in place, OOB as soon as able and IS bedside post-procedure, Supplemental oxygen to goal 88-92%    ACS Team surgery  [id number]

## 2023-09-16 NOTE — PROGRESS NOTE ADULT - TIME BILLING
as above:  multifactorial resp failure--severe copd by hx (no PFTS at this time), debility/deconditioning, ? CAD--on O2 NC sat above 90%  copd--initiate symbicort 160 2 bid, spiriva 2 q day, duoneb q 6, incentive spirometry  pndrip-flonase 1 sniff bid    The patients [place] respiratory risk is 3% risk of mechanical ventilation for >48 hours after surgery or unplanned intubation <30 days of surgery. Her ARISCAT Score is 59 considering mostly driven by her oxygen saturation and recent respiratory infection requiring antibiotics, placing her at high risk 42.1% of in hospital pulmonary complications including respiratory failure, respiratory infection, pleural effusion, atelectasis pneumothorax, bronchospasm, aspiration pneumonitis.   That being said, she is not currently in an exacerbation. She was not wheezing and reportedly at her baseline respiratory status. Her pulmonary disease would not be prohibitive for an urgent, emergent, or necessary procedure  POST OP:  - She has a CO2 of 55 on her blood gas, would ensure BiPAP is at bedside as she may be prone to retain with sedating medications  - Would be judicious with any sedating medications including pain medications  - Would ensure aspiration precautions in place, OOB as soon as able and IS bedside post-procedure  - Supplemental oxygen to goal 88-92%    DVT prophlaxis:  subcutaneous lovenox or heparin as per primary team   sequential teds stockings   early ambulation  Post op as per surg team-pain control  Out pt-PFTS, lung cancer screening etc    Willam Jones MD-Pulmonary   [phone]

## 2023-09-16 NOTE — PROGRESS NOTE ADULT - SUBJECTIVE AND OBJECTIVE BOX
CHIEF COMPLAINT: f/up for SOB, copd, pulmonary clearance--some pain, stable breathing over all--no cough or pain    Interval Events: for surgery today    REVIEW OF SYSTEMS:  Constitutional: No fevers or chills. No weight loss. No fatigue or generalized malaise.  Eyes: No itching or discharge from the eyes  ENT: No ear pain. No ear discharge. No nasal congestion. No post nasal drip. No epistaxis. No throat pain. No sore throat. No difficulty swallowing.   CV: No chest pain. No palpitations. No lightheadedness or dizziness.   Resp: No dyspnea at rest. + dyspnea on exertion. No orthopnea. No wheezing. No cough. No stridor. No sputum production. No chest pain with respiration.  GI: No nausea. No vomiting. No diarrhea.  MSK: No joint pain or pain in any extremities  Integumentary: No skin lesions. No pedal edema.  Neurological: No gross motor weakness. No sensory changes.  [+ ] All other systems negative  [ ] Unable to assess ROS because ________    OBJECTIVE:  ICU Vital Signs Last 24 Hrs  T(C): 36.7 (16 Sep 2023 05:11), Max: 36.9 (15 Sep 2023 09:35)  T(F): 98 (16 Sep 2023 05:11), Max: 98.5 (15 Sep 2023 09:35)  HR: 77 (16 Sep 2023 05:11) (74 - 87)  BP: 103/58 (16 Sep 2023 05:11) (103/58 - 150/68)  BP(mean): --  ABP: --  ABP(mean): --  RR: 18 (16 Sep 2023 05:11) (18 - 19)  SpO2: 96% (16 Sep 2023 05:11) (91% - 96%)    O2 Parameters below as of 16 Sep 2023 05:11  Patient On (Oxygen Delivery Method): nasal cannula  O2 Flow (L/min): 3            09-14 @ 07:01  -  09-15 @ 07:00  --------------------------------------------------------  IN: 240 mL / OUT: 450 mL / NET: -210 mL    09-15 @ 07:01  -  09-16 @ 05:17  --------------------------------------------------------  IN: 980 mL / OUT: 1850 mL / NET: -870 mL      CAPILLARY BLOOD GLUCOSE      POCT Blood Glucose.: 201 mg/dL (15 Sep 2023 23:49)      PHYSICAL EXAM: NAD in bed on NC O2  General: Awake, alert, oriented X 3.   HEENT: Atraumatic, normocephalic.                 Mallampatti Grade 2                No nasal congestion.                No tonsillar or pharyngeal exudates.  Lymph Nodes: No palpable lymphadenopathy  Neck: No JVD. No carotid bruit.   Respiratory: Normal chest expansion                         Normal percussion                         Normal and equal air entry                         mild exp wheeze,no rhonchi or rales.  Cardiovascular: S1 S2 normal. No murmurs, rubs or gallops.   Abdomen: Soft, non-tender, non-distended. No organomegaly. Normoactive bowel sounds.  Extremities: Warm to touch. Peripheral pulse palpable. No pedal edema.   Skin: No rashes or skin lesions  Neurological: Motor and sensory examination equal and normal in all four extremities.  Psychiatry: Appropriate mood and affect.    HOSPITAL MEDICATIONS:  MEDICATIONS  (STANDING):  acetaminophen     Tablet .. 975 milliGRAM(s) Oral every 6 hours  albuterol/ipratropium for Nebulization 3 milliLiter(s) Nebulizer every 4 hours  cefTRIAXone   IVPB 1000 milliGRAM(s) IV Intermittent every 24 hours  clonazePAM  Tablet 0.5 milliGRAM(s) Oral every 12 hours  dextrose 5%. 1000 milliLiter(s) (100 mL/Hr) IV Continuous <Continuous>  dextrose 5%. 1000 milliLiter(s) (50 mL/Hr) IV Continuous <Continuous>  dextrose 50% Injectable 25 Gram(s) IV Push once  dextrose 50% Injectable 25 Gram(s) IV Push once  dextrose 50% Injectable 12.5 Gram(s) IV Push once  enoxaparin Injectable 40 milliGRAM(s) SubCutaneous every 24 hours  glucagon  Injectable 1 milliGRAM(s) IntraMuscular once  insulin lispro (ADMELOG) corrective regimen sliding scale   SubCutaneous at bedtime  insulin lispro (ADMELOG) corrective regimen sliding scale   SubCutaneous three times a day before meals  lactated ringers. 1000 milliLiter(s) (80 mL/Hr) IV Continuous <Continuous>  mirtazapine 45 milliGRAM(s) Oral at bedtime  PARoxetine 30 milliGRAM(s) Oral daily  QUEtiapine 100 milliGRAM(s) Oral at bedtime  simvastatin 10 milliGRAM(s) Oral at bedtime    MEDICATIONS  (PRN):  dextrose Oral Gel 15 Gram(s) Oral once PRN Blood Glucose LESS THAN 70 milliGRAM(s)/deciliter      LABS:                        12.7   4.43  )-----------( 268      ( 15 Sep 2023 06:56 )             39.3     09-15    141  |  106  |  24<H>  ----------------------------<  102<H>  4.3   |  25  |  0.70    Ca    8.7      15 Sep 2023 06:51  Phos  3.4     09-15  Mg     1.9     09-15    TPro  6.2  /  Alb  3.8  /  TBili  0.2  /  DBili  x   /  AST  20  /  ALT  16  /  AlkPhos  34<L>  09-15      Urinalysis Basic - ( 15 Sep 2023 06:51 )    Color: x / Appearance: x / SG: x / pH: x  Gluc: 102 mg/dL / Ketone: x  / Bili: x / Urobili: x   Blood: x / Protein: x / Nitrite: x   Leuk Esterase: x / RBC: x / WBC x   Sq Epi: x / Non Sq Epi: x / Bacteria: x      Arterial Blood Gas:  09-16 @ 04:28  7.33/54/53/28/83.9/1.5  ABG lactate: --        MICROBIOLOGY:     RADIOLOGY:  [ ] Reviewed and interpreted by me    Point of Care Ultrasound Findings:    PFT:    EKG:

## 2023-09-16 NOTE — PROGRESS NOTE ADULT - SUBJECTIVE AND OBJECTIVE BOX
ACS DAILY PROGRESS NOTE    SUBJECTIVE:  Patient seen and examined at bedside during morning rounds. No overnight events. Patient endorsing persistent RUQ pain and expressed her desire to have surgery to remove gallbladder. Patient was informed of pulmonary recommendation and concerns for intubation and surgery given her pulmonary history, patient understands and wanted to continue with surgery. Patient denies subjective fever/chills, CP, SOB, or n/v.       Vital Signs Last 24 Hrs  T(C): 36.4 (15 Sep 2023 04:50), Max: 36.9 (14 Sep 2023 19:15)  T(F): 97.5 (15 Sep 2023 04:50), Max: 98.5 (14 Sep 2023 19:15)  HR: 75 (15 Sep 2023 04:50) (69 - 79)  BP: 118/66 (15 Sep 2023 04:50) (104/51 - 130/68)  BP(mean): 88 (14 Sep 2023 19:15) (79 - 88)  RR: 18 (15 Sep 2023 04:50) (18 - 18)  SpO2: 95% (15 Sep 2023 04:50) (77% - 95%)    Parameters below as of 15 Sep 2023 04:50  Patient On (Oxygen Delivery Method): nasal cannula  O2 Flow (L/min): 4      I&Os    09-14-23 @ 07:01  -  09-15-23 @ 07:00  --------------------------------------------------------  IN: 240 mL / OUT: 450 mL / NET: -210 mL      PHYSICAL EXAM:  GENERAL: NAD, resting comfortably in bed  HEAD: Normocephalic, atraumatic   LUNG: Nonlabored breathing.  ABD: Soft, nondistended. Nontender.  EXT: WWP  NEURO: Responds appropriately    MEDICATIONS:  acetaminophen     Tablet .. 650 milliGRAM(s) Oral every 6 hours  dextrose 5%. 1000 milliLiter(s) IV Continuous <Continuous>  dextrose 5%. 1000 milliLiter(s) IV Continuous <Continuous>  dextrose 50% Injectable 25 Gram(s) IV Push once  dextrose 50% Injectable 12.5 Gram(s) IV Push once  dextrose 50% Injectable 25 Gram(s) IV Push once  dextrose Oral Gel 15 Gram(s) Oral once PRN  enoxaparin Injectable 40 milliGRAM(s) SubCutaneous every 24 hours  glucagon  Injectable 1 milliGRAM(s) IntraMuscular once  insulin lispro (ADMELOG) corrective regimen sliding scale   SubCutaneous at bedtime  insulin lispro (ADMELOG) corrective regimen sliding scale   SubCutaneous three times a day before meals      LABS:                        12.7   4.43  )-----------( 268      ( 15 Sep 2023 06:56 )             39.3     09-15    141  |  106  |  24<H>  ----------------------------<  102<H>  4.3   |  25  |  0.70    Ca    8.7      15 Sep 2023 06:51  Phos  3.4     09-15  Mg     1.9     09-15    TPro  6.2  /  Alb  3.8  /  TBili  0.2  /  DBili  x   /  AST  20  /  ALT  16  /  AlkPhos  34<L>  09-15    PT/INR - ( 13 Sep 2023 23:04 )   PT: 10.7 sec;   INR: 0.97 ratio    PTT - ( 13 Sep 2023 23:04 )  PTT:29.1 sec    Urinalysis Basic - ( 15 Sep 2023 06:51 )  Color: x / Appearance: x / SG: x / pH: x  Gluc: 102 mg/dL / Ketone: x  / Bili: x / Urobili: x   Blood: x / Protein: x / Nitrite: x   Leuk Esterase: x / RBC: x / WBC x   Sq Epi: x / Non Sq Epi: x / Bacteria: x

## 2023-09-16 NOTE — PROGRESS NOTE ADULT - ASSESSMENT
68 year old female long time smoker (quit 3-4 months ago) with a history of newly diagnosed COPD as of 8/22/23 admission presents with symptoms consistent with biliary colic and is potentially planned for laparoscopic cholecystectomy. Pulmonary is consulted for preoperative risk stratification.     Unfortunately, we do not have PFT's or prior data to better qualify her disease status. She is a new diagnosis as of her recent admission.   The patients [place] respiratory risk is 3% risk of mechanical ventilation for >48 hours after surgery or unplanned intubation <30 days of surgery. Her ARISCAT Score is 59 considering mostly driven by her oxygen saturation and recent respiratory infection requiring antibiotics, placing her at high risk 42.1% of in hospital pulmonary complications including respiratory failure, respiratory infection, pleural effusion, atelectasis pneumothorax, bronchospasm, aspiration pneumonitis.   That being said, she is not currently in an exacerbation. She was not wheezing and reportedly at her baseline respiratory status. Her pulmonary disease would not be prohibitive for an urgent, emergent, or necessary procedure. Would ensure the following if she is to go to the OR.    - She should be on around the clock duo-neb q 4 hours  - She has a CO2 of 55 on her blood gas, would ensure BiPAP is at bedside as she may be prone to retain with sedating medications  - Would be judicious with any sedating medications including pain medications  - Would ensure aspiration precautions in place, OOB as soon as able and IS bedside post-procedure  - Supplemental oxygen to goal 88-92%  ***************************                                          SEE Below:  9/16-stable o/n awaiting OR

## 2023-09-17 ENCOUNTER — TRANSCRIPTION ENCOUNTER (OUTPATIENT)
Age: 68
End: 2023-09-17

## 2023-09-17 LAB
ANION GAP SERPL CALC-SCNC: 11 MMOL/L — SIGNIFICANT CHANGE UP (ref 5–17)
BASE EXCESS BLDV CALC-SCNC: 1.9 MMOL/L — SIGNIFICANT CHANGE UP (ref -2–3)
BUN SERPL-MCNC: 16 MG/DL — SIGNIFICANT CHANGE UP (ref 7–23)
CA-I SERPL-SCNC: 1.28 MMOL/L — SIGNIFICANT CHANGE UP (ref 1.15–1.33)
CALCIUM SERPL-MCNC: 9.3 MG/DL — SIGNIFICANT CHANGE UP (ref 8.4–10.5)
CHLORIDE BLDV-SCNC: 107 MMOL/L — SIGNIFICANT CHANGE UP (ref 96–108)
CHLORIDE SERPL-SCNC: 108 MMOL/L — SIGNIFICANT CHANGE UP (ref 96–108)
CO2 BLDV-SCNC: 30 MMOL/L — HIGH (ref 22–26)
CO2 SERPL-SCNC: 23 MMOL/L — SIGNIFICANT CHANGE UP (ref 22–31)
CREAT SERPL-MCNC: 0.69 MG/DL — SIGNIFICANT CHANGE UP (ref 0.5–1.3)
EGFR: 94 ML/MIN/1.73M2 — SIGNIFICANT CHANGE UP
GAS PNL BLDV: 139 MMOL/L — SIGNIFICANT CHANGE UP (ref 136–145)
GAS PNL BLDV: SIGNIFICANT CHANGE UP
GAS PNL BLDV: SIGNIFICANT CHANGE UP
GLUCOSE BLDC GLUCOMTR-MCNC: 102 MG/DL — HIGH (ref 70–99)
GLUCOSE BLDC GLUCOMTR-MCNC: 103 MG/DL — HIGH (ref 70–99)
GLUCOSE BLDC GLUCOMTR-MCNC: 120 MG/DL — HIGH (ref 70–99)
GLUCOSE BLDC GLUCOMTR-MCNC: 145 MG/DL — HIGH (ref 70–99)
GLUCOSE BLDC GLUCOMTR-MCNC: 238 MG/DL — HIGH (ref 70–99)
GLUCOSE BLDV-MCNC: 121 MG/DL — HIGH (ref 70–99)
GLUCOSE SERPL-MCNC: 118 MG/DL — HIGH (ref 70–99)
HCO3 BLDV-SCNC: 28 MMOL/L — SIGNIFICANT CHANGE UP (ref 22–29)
HCT VFR BLD CALC: 37.9 % — SIGNIFICANT CHANGE UP (ref 34.5–45)
HCT VFR BLDA CALC: 41 % — SIGNIFICANT CHANGE UP (ref 34.5–46.5)
HGB BLD CALC-MCNC: 13.5 G/DL — SIGNIFICANT CHANGE UP (ref 11.7–16.1)
HGB BLD-MCNC: 12.2 G/DL — SIGNIFICANT CHANGE UP (ref 11.5–15.5)
LACTATE BLDV-MCNC: 1.4 MMOL/L — SIGNIFICANT CHANGE UP (ref 0.5–2)
MAGNESIUM SERPL-MCNC: 1.8 MG/DL — SIGNIFICANT CHANGE UP (ref 1.6–2.6)
MCHC RBC-ENTMCNC: 30.6 PG — SIGNIFICANT CHANGE UP (ref 27–34)
MCHC RBC-ENTMCNC: 32.2 GM/DL — SIGNIFICANT CHANGE UP (ref 32–36)
MCV RBC AUTO: 95 FL — SIGNIFICANT CHANGE UP (ref 80–100)
NRBC # BLD: 0 /100 WBCS — SIGNIFICANT CHANGE UP (ref 0–0)
PCO2 BLDV: 50 MMHG — HIGH (ref 39–42)
PH BLDV: 7.36 — SIGNIFICANT CHANGE UP (ref 7.32–7.43)
PHOSPHATE SERPL-MCNC: 3.1 MG/DL — SIGNIFICANT CHANGE UP (ref 2.5–4.5)
PLATELET # BLD AUTO: 273 K/UL — SIGNIFICANT CHANGE UP (ref 150–400)
PO2 BLDV: 47 MMHG — HIGH (ref 25–45)
POTASSIUM BLDV-SCNC: 4.6 MMOL/L — SIGNIFICANT CHANGE UP (ref 3.5–5.1)
POTASSIUM SERPL-MCNC: 4.4 MMOL/L — SIGNIFICANT CHANGE UP (ref 3.5–5.3)
POTASSIUM SERPL-SCNC: 4.4 MMOL/L — SIGNIFICANT CHANGE UP (ref 3.5–5.3)
RBC # BLD: 3.99 M/UL — SIGNIFICANT CHANGE UP (ref 3.8–5.2)
RBC # FLD: 13.7 % — SIGNIFICANT CHANGE UP (ref 10.3–14.5)
SAO2 % BLDV: 80 % — SIGNIFICANT CHANGE UP (ref 67–88)
SODIUM SERPL-SCNC: 142 MMOL/L — SIGNIFICANT CHANGE UP (ref 135–145)
WBC # BLD: 4.58 K/UL — SIGNIFICANT CHANGE UP (ref 3.8–10.5)
WBC # FLD AUTO: 4.58 K/UL — SIGNIFICANT CHANGE UP (ref 3.8–10.5)

## 2023-09-17 PROCEDURE — 99232 SBSQ HOSP IP/OBS MODERATE 35: CPT

## 2023-09-17 PROCEDURE — 99231 SBSQ HOSP IP/OBS SF/LOW 25: CPT

## 2023-09-17 RX ORDER — SODIUM CHLORIDE 9 MG/ML
1000 INJECTION, SOLUTION INTRAVENOUS
Refills: 0 | Status: DISCONTINUED | OUTPATIENT
Start: 2023-09-17 | End: 2023-09-18

## 2023-09-17 RX ORDER — INSULIN LISPRO 100/ML
VIAL (ML) SUBCUTANEOUS AT BEDTIME
Refills: 0 | Status: DISCONTINUED | OUTPATIENT
Start: 2023-09-17 | End: 2023-09-18

## 2023-09-17 RX ORDER — ACETAMINOPHEN 500 MG
1000 TABLET ORAL ONCE
Refills: 0 | Status: COMPLETED | OUTPATIENT
Start: 2023-09-17 | End: 2023-09-17

## 2023-09-17 RX ORDER — DEXTROSE 50 % IN WATER 50 %
15 SYRINGE (ML) INTRAVENOUS ONCE
Refills: 0 | Status: DISCONTINUED | OUTPATIENT
Start: 2023-09-17 | End: 2023-09-18

## 2023-09-17 RX ORDER — MAGNESIUM SULFATE 500 MG/ML
2 VIAL (ML) INJECTION ONCE
Refills: 0 | Status: COMPLETED | OUTPATIENT
Start: 2023-09-17 | End: 2023-09-17

## 2023-09-17 RX ORDER — INSULIN LISPRO 100/ML
VIAL (ML) SUBCUTANEOUS
Refills: 0 | Status: DISCONTINUED | OUTPATIENT
Start: 2023-09-17 | End: 2023-09-18

## 2023-09-17 RX ORDER — PANTOPRAZOLE SODIUM 20 MG/1
40 TABLET, DELAYED RELEASE ORAL DAILY
Refills: 0 | Status: DISCONTINUED | OUTPATIENT
Start: 2023-09-17 | End: 2023-09-18

## 2023-09-17 RX ORDER — ONDANSETRON 8 MG/1
4 TABLET, FILM COATED ORAL ONCE
Refills: 0 | Status: COMPLETED | OUTPATIENT
Start: 2023-09-17 | End: 2023-09-17

## 2023-09-17 RX ORDER — GLUCAGON INJECTION, SOLUTION 0.5 MG/.1ML
1 INJECTION, SOLUTION SUBCUTANEOUS ONCE
Refills: 0 | Status: DISCONTINUED | OUTPATIENT
Start: 2023-09-17 | End: 2023-09-18

## 2023-09-17 RX ADMIN — Medication 2: at 00:02

## 2023-09-17 RX ADMIN — Medication 1000 MILLIGRAM(S): at 01:23

## 2023-09-17 RX ADMIN — Medication 1000 MILLIGRAM(S): at 20:11

## 2023-09-17 RX ADMIN — TIOTROPIUM BROMIDE 2 PUFF(S): 18 CAPSULE ORAL; RESPIRATORY (INHALATION) at 11:49

## 2023-09-17 RX ADMIN — SIMVASTATIN 10 MILLIGRAM(S): 20 TABLET, FILM COATED ORAL at 21:20

## 2023-09-17 RX ADMIN — ENOXAPARIN SODIUM 40 MILLIGRAM(S): 100 INJECTION SUBCUTANEOUS at 11:50

## 2023-09-17 RX ADMIN — Medication 3 MILLILITER(S): at 12:15

## 2023-09-17 RX ADMIN — Medication 30 MILLIGRAM(S): at 12:15

## 2023-09-17 RX ADMIN — Medication 3 MILLILITER(S): at 08:46

## 2023-09-17 RX ADMIN — Medication 400 MILLIGRAM(S): at 08:45

## 2023-09-17 RX ADMIN — QUETIAPINE FUMARATE 100 MILLIGRAM(S): 200 TABLET, FILM COATED ORAL at 21:20

## 2023-09-17 RX ADMIN — Medication 0.5 MILLIGRAM(S): at 17:38

## 2023-09-17 RX ADMIN — PANTOPRAZOLE SODIUM 40 MILLIGRAM(S): 20 TABLET, DELAYED RELEASE ORAL at 19:41

## 2023-09-17 RX ADMIN — BUDESONIDE AND FORMOTEROL FUMARATE DIHYDRATE 2 PUFF(S): 160; 4.5 AEROSOL RESPIRATORY (INHALATION) at 05:09

## 2023-09-17 RX ADMIN — BUDESONIDE AND FORMOTEROL FUMARATE DIHYDRATE 2 PUFF(S): 160; 4.5 AEROSOL RESPIRATORY (INHALATION) at 17:37

## 2023-09-17 RX ADMIN — Medication 400 MILLIGRAM(S): at 19:41

## 2023-09-17 RX ADMIN — ONDANSETRON 4 MILLIGRAM(S): 8 TABLET, FILM COATED ORAL at 19:41

## 2023-09-17 RX ADMIN — Medication 400 MILLIGRAM(S): at 01:04

## 2023-09-17 RX ADMIN — Medication 400 MILLIGRAM(S): at 11:49

## 2023-09-17 RX ADMIN — CEFTRIAXONE 100 MILLIGRAM(S): 500 INJECTION, POWDER, FOR SOLUTION INTRAMUSCULAR; INTRAVENOUS at 14:46

## 2023-09-17 RX ADMIN — Medication 1000 MILLIGRAM(S): at 12:15

## 2023-09-17 RX ADMIN — MIRTAZAPINE 45 MILLIGRAM(S): 45 TABLET, ORALLY DISINTEGRATING ORAL at 21:20

## 2023-09-17 RX ADMIN — Medication 25 GRAM(S): at 11:59

## 2023-09-17 RX ADMIN — Medication 3 MILLILITER(S): at 03:45

## 2023-09-17 NOTE — PRE PROCEDURE NOTE - PRE PROCEDURE EVALUATION
Surgery Preop Note    Procedure: Lap ollie  Surgeon:                          12.2   4.58  )-----------( 273      ( 17 Sep 2023 07:23 )             37.9     09-17    142  |  108  |  16  ----------------------------<  118<H>  4.4   |  23  |  0.69    Ca    9.3      17 Sep 2023 07:22  Phos  3.1     09-17  Mg     1.8     09-17    TPro  6.4  /  Alb  4.0  /  TBili  0.2  /  DBili  x   /  AST  17  /  ALT  17  /  AlkPhos  36<L>  09-16    PT/INR - ( 16 Sep 2023 07:13 )   PT: 10.2 sec;   INR: 0.97 ratio         PTT - ( 16 Sep 2023 07:13 )  PTT:29.4 sec    Urinalysis Basic - ( 17 Sep 2023 07:22 )    Color: x / Appearance: x / SG: x / pH: x  Gluc: 118 mg/dL / Ketone: x  / Bili: x / Urobili: x   Blood: x / Protein: x / Nitrite: x   Leuk Esterase: x / RBC: x / WBC x   Sq Epi: x / Non Sq Epi: x / Bacteria: x        Assessment:    Preop Checklist:  [x]NPO after midnight  [x]IVF  [x]AM labs (CBC, BMP, coags)  [x]Type and Screen x2  [x]Consent    Trauma/ACS Team  [phone]

## 2023-09-17 NOTE — PROGRESS NOTE ADULT - ASSESSMENT
68 year old female long time smoker (quit 3-4 months ago) with a history of newly diagnosed COPD as of 8/22/23 admission presents with symptoms consistent with biliary colic and is potentially planned for laparoscopic cholecystectomy. Pulmonary is consulted for preoperative risk stratification.     Unfortunately, we do not have PFT's or prior data to better qualify her disease status. She is a new diagnosis as of her recent admission.   The patients Valencia respiratory risk is 3% risk of mechanical ventilation for >48 hours after surgery or unplanned intubation <30 days of surgery. Her ARISCAT Score is 59 considering mostly driven by her oxygen saturation and recent respiratory infection requiring antibiotics, placing her at high risk 42.1% of in hospital pulmonary complications including respiratory failure, respiratory infection, pleural effusion, atelectasis pneumothorax, bronchospasm, aspiration pneumonitis.   That being said, she is not currently in an exacerbation. She was not wheezing and reportedly at her baseline respiratory status. Her pulmonary disease would not be prohibitive for an urgent, emergent, or necessary procedure. Would ensure the following if she is to go to the OR.    - She should be on around the clock duo-neb q 4 hours  - She has a CO2 of 55 on her blood gas, would ensure BiPAP is at bedside as she may be prone to retain with sedating medications  - Would be judicious with any sedating medications including pain medications  - Would ensure aspiration precautions in place, OOB as soon as able and IS bedside post-procedure  - Supplemental oxygen to goal 88-92%  ***************************                                          SEE Below:  9/16-stable o/n awaiting OR  68 year old female long time smoker (quit 3-4 months ago) with a history of newly diagnosed COPD as of 8/22/23 admission presents with symptoms consistent with biliary colic and is potentially planned for laparoscopic cholecystectomy. Pulmonary is consulted for preoperative risk stratification.     Unfortunately, we do not have PFT's or prior data to better qualify her disease status. She is a new diagnosis as of her recent admission.   The patients Valencia respiratory risk is 3% risk of mechanical ventilation for >48 hours after surgery or unplanned intubation <30 days of surgery. Her ARISCAT Score is 59 considering mostly driven by her oxygen saturation and recent respiratory infection requiring antibiotics, placing her at high risk 42.1% of in hospital pulmonary complications including respiratory failure, respiratory infection, pleural effusion, atelectasis pneumothorax, bronchospasm, aspiration pneumonitis.   That being said, she is not currently in an exacerbation. She was not wheezing and reportedly at her baseline respiratory status. Her pulmonary disease would not be prohibitive for an urgent, emergent, or necessary procedure. Would ensure the following if she is to go to the OR.    - She should be on around the clock duo-neb q 4 hours  - She has a CO2 of 55 on her blood gas, would ensure BiPAP is at bedside as she may be prone to retain with sedating medications  - Would be judicious with any sedating medications including pain medications  - Would ensure aspiration precautions in place, OOB as soon as able and IS bedside post-procedure  - Supplemental oxygen to goal 88-92%  ***************************                                          SEE Below:  9/16-stable o/n awaiting OR  9/17-still awaiting OR

## 2023-09-17 NOTE — PROGRESS NOTE ADULT - ASSESSMENT
69 y/o F with PMHx of COPD (has rx for home O2, does not use all the time), DM who presents to ED c/o right upper abdominal pain. In ED, patient complains of RUQ pain, Normal WBC, US: cholelithiasis and adenomyomatosis, collapsed GB, no signs of acute cholecystitis. Consistent with billary cholic.     PLAN:  - Pain control  - OR today for lap choly   - Pulm recs appreciated  (around the clock duo-neb q 4 hours, would ensure BiPAP is at bedside as she may be prone to retain with sedating medications, would be judicious with any sedating medications including pain medications, would ensure aspiration precautions in place, OOB as soon as able and IS bedside post-procedure, Supplemental oxygen to goal 88-92%    ACS Team surgery  [id number]

## 2023-09-17 NOTE — PROGRESS NOTE ADULT - ATTENDING COMMENTS
Pt is a 68 year old female with a medical history significant for COPD who was admitted to [facility] with biliary colic. Pt was seen by pulmonary medicine in the ED and they report she is optimized for surgery. Pt was admitted to the surgical service and was added on for an elective cholecystectomy. She is aware that prolonged intubation may result. If significant, a tracheostomy may be necessary. Pt understands these risks and requests cholecystectomy. No acute events overnight. Pt remains on the add on schedule for cholecystectomy.    WBC 4.6    A/p  Biliary Colic  COPD  Continue ceftriaxone  For lx CCY.
Pt is a 68 year old female with a medical history significant for COPD who was admitted to [facility] with biliary colic. Pt was seen by pulmonary medicine in the ED and they report she is optimized for surgery. Pt was admitted to the surgical service and was added on for an elective cholecystectomy. She is aware that prolonged intubation may result. If significant, a tracheostomy may be necessary. Pt understands these risks and requests cholecystectomy. No acute events overnight. Pt remains on the add on schedule for cholecystectomy.    WBC 4.7  U/A  positive    A/p  Biliary Colic  COPD  Continue ceftriaxone  For lx CCY .
Pt is a 68 year old female with a medical history signficant for COPD who was admitted to [facility] with biliary colic. Pt was seen by pulmonary medicine in the ED and they report she is optimized for surgery. Pt was admitted to the surgical service and was added on for an elective cholecystectomy. She is aware that prolonged intubation may result. If significant, a tracheostomy may be necessary. Pt understands these risks and requests cholecystectomy. No acute events overnight. Pt was placed on the add on schedule for cholecystectomy.    WBC 4.43  U/A  positive    A/p  Biliary Colic  COPD  For lx CCY

## 2023-09-17 NOTE — PROGRESS NOTE ADULT - TIME BILLING
as above:  multifactorial resp failure--severe copd by hx (no PFTS at this time), debility/deconditioning, ? CAD--on O2 NC sat above 90%  copd--initiate symbicort 160 2 bid, spiriva 2 q day, duoneb q 6, incentive spirometry  pndrip-flonase 1 sniff bid    The patients Valencia respiratory risk is 3% risk of mechanical ventilation for >48 hours after surgery or unplanned intubation <30 days of surgery. Her ARISCAT Score is 59 considering mostly driven by her oxygen saturation and recent respiratory infection requiring antibiotics, placing her at high risk 42.1% of in hospital pulmonary complications including respiratory failure, respiratory infection, pleural effusion, atelectasis pneumothorax, bronchospasm, aspiration pneumonitis.   That being said, she is not currently in an exacerbation. She was not wheezing and reportedly at her baseline respiratory status. Her pulmonary disease would not be prohibitive for an urgent, emergent, or necessary procedure  POST OP:  - She has a CO2 of 55 on her blood gas, would ensure BiPAP is at bedside as she may be prone to retain with sedating medications  - Would be judicious with any sedating medications including pain medications  - Would ensure aspiration precautions in place, OOB as soon as able and IS bedside post-procedure  - Supplemental oxygen to goal 88-92%    DVT prophlaxis:  subcutaneous lovenox or heparin as per primary team   sequential teds stockings   early ambulation  Post op as per surg team-pain control  Out pt-PFTS, lung cancer screening etc    Willam Jones MD-Pulmonary   [phone] as above: ?OR today  multifactorial resp failure--severe copd by hx (no PFTS at this time), debility/deconditioning, ? CAD--on O2 NC sat above 90%  copd--initiate symbicort 160 2 bid, spiriva 2 q day, duoneb q 6, incentive spirometry  pndrip-flonase 1 sniff bid      ***No absolute pulmonary contras to planned surgery (see below)    The patients Valencia respiratory risk is 3% risk of mechanical ventilation for >48 hours after surgery or unplanned intubation <30 days of surgery. Her ARISCAT Score is 59 considering mostly driven by her oxygen saturation and recent respiratory infection requiring antibiotics, placing her at high risk 42.1% of in hospital pulmonary complications including respiratory failure, respiratory infection, pleural effusion, atelectasis pneumothorax, bronchospasm, aspiration pneumonitis.   That being said, she is not currently in an exacerbation. She was not wheezing and reportedly at her baseline respiratory status. Her pulmonary disease would not be prohibitive for an urgent, emergent, or necessary procedure  POST OP:  - She has a CO2 of 55 on her blood gas, would ensure BiPAP is at bedside as she may be prone to retain with sedating medications  - Would be judicious with any sedating medications including pain medications  - Would ensure aspiration precautions in place, OOB as soon as able and IS bedside post-procedure  - Supplemental oxygen to goal 88-92%    DVT prophlaxis:  subcutaneous lovenox or heparin as per primary team   sequential teds stockings   early ambulation  Post op as per surg team-pain control  Out pt-PFTS, lung cancer screening etc    Willam Jones MD-Pulmonary   [phone]

## 2023-09-17 NOTE — PROGRESS NOTE ADULT - SUBJECTIVE AND OBJECTIVE BOX
CHIEF COMPLAINT: f/up for SOB, copd, pulmonary clearance--    Interval Events:    REVIEW OF SYSTEMS:  Constitutional: No fevers or chills. No weight loss. No fatigue or generalized malaise.  Eyes: No itching or discharge from the eyes  ENT: No ear pain. No ear discharge. No nasal congestion. No post nasal drip. No epistaxis. No throat pain. No sore throat. No difficulty swallowing.   CV: No chest pain. No palpitations. No lightheadedness or dizziness.   Resp: No dyspnea at rest. No dyspnea on exertion. No orthopnea. No wheezing. No cough. No stridor. No sputum production. No chest pain with respiration.  GI: No nausea. No vomiting. No diarrhea.  MSK: No joint pain or pain in any extremities  Integumentary: No skin lesions. No pedal edema.  Neurological: No gross motor weakness. No sensory changes.  [ ] All other systems negative  [ ] Unable to assess ROS because ________    OBJECTIVE:  ICU Vital Signs Last 24 Hrs  T(C): 37.1 (17 Sep 2023 05:35), Max: 37.1 (17 Sep 2023 05:35)  T(F): 98.7 (17 Sep 2023 05:35), Max: 98.7 (17 Sep 2023 05:35)  HR: 87 (17 Sep 2023 05:35) (78 - 90)  BP: 106/68 (17 Sep 2023 05:35) (106/68 - 144/72)  BP(mean): 88 (16 Sep 2023 14:24) (88 - 88)  ABP: --  ABP(mean): --  RR: 18 (17 Sep 2023 05:35) (18 - 18)  SpO2: 96% (17 Sep 2023 05:35) (92% - 96%)    O2 Parameters below as of 17 Sep 2023 05:35  Patient On (Oxygen Delivery Method): nasal cannula  O2 Flow (L/min): 3            09-15 @ 07:01  -  09-16 @ 07:00  --------------------------------------------------------  IN: 980 mL / OUT: 1850 mL / NET: -870 mL    09-16 @ 07:01  -  09-17 @ 05:56  --------------------------------------------------------  IN: 0 mL / OUT: 1450 mL / NET: -1450 mL      CAPILLARY BLOOD GLUCOSE      POCT Blood Glucose.: 145 mg/dL (17 Sep 2023 05:08)      PHYSICAL EXAM:  General: Awake, alert, oriented X 3.   HEENT: Atraumatic, normocephalic.                 Mallampatti Grade                 No nasal congestion.                No tonsillar or pharyngeal exudates.  Lymph Nodes: No palpable lymphadenopathy  Neck: No JVD. No carotid bruit.   Respiratory: Normal chest expansion                         Normal percussion                         Normal and equal air entry                         No wheeze, rhonchi or rales.  Cardiovascular: S1 S2 normal. No murmurs, rubs or gallops.   Abdomen: Soft, non-tender, non-distended. No organomegaly. Normoactive bowel sounds.  Extremities: Warm to touch. Peripheral pulse palpable. No pedal edema.   Skin: No rashes or skin lesions  Neurological: Motor and sensory examination equal and normal in all four extremities.  Psychiatry: Appropriate mood and affect.    HOSPITAL MEDICATIONS:  MEDICATIONS  (STANDING):  acetaminophen   IVPB .. 1000 milliGRAM(s) IV Intermittent every 6 hours  albuterol/ipratropium for Nebulization 3 milliLiter(s) Nebulizer every 4 hours  budesonide 160 MICROgram(s)/formoterol 4.5 MICROgram(s) Inhaler 2 Puff(s) Inhalation two times a day  cefTRIAXone   IVPB 1000 milliGRAM(s) IV Intermittent every 24 hours  clonazePAM  Tablet 0.5 milliGRAM(s) Oral every 12 hours  dextrose 5%. 1000 milliLiter(s) (100 mL/Hr) IV Continuous <Continuous>  dextrose 5%. 1000 milliLiter(s) (50 mL/Hr) IV Continuous <Continuous>  dextrose 50% Injectable 25 Gram(s) IV Push once  dextrose 50% Injectable 12.5 Gram(s) IV Push once  dextrose 50% Injectable 25 Gram(s) IV Push once  enoxaparin Injectable 40 milliGRAM(s) SubCutaneous every 24 hours  glucagon  Injectable 1 milliGRAM(s) IntraMuscular once  insulin lispro (ADMELOG) corrective regimen sliding scale   SubCutaneous every 6 hours  lactated ringers. 1000 milliLiter(s) (80 mL/Hr) IV Continuous <Continuous>  mirtazapine 45 milliGRAM(s) Oral at bedtime  PARoxetine 30 milliGRAM(s) Oral daily  QUEtiapine 100 milliGRAM(s) Oral at bedtime  simvastatin 10 milliGRAM(s) Oral at bedtime  tiotropium 2.5 MICROgram(s) Inhaler 2 Puff(s) Inhalation daily    MEDICATIONS  (PRN):  dextrose Oral Gel 15 Gram(s) Oral once PRN Blood Glucose LESS THAN 70 milliGRAM(s)/deciliter      LABS:                        12.3   4.66  )-----------( 265      ( 16 Sep 2023 07:16 )             38.3     09-16    142  |  104  |  23  ----------------------------<  122<H>  4.2   |  24  |  0.68    Ca    9.1      16 Sep 2023 07:01  Phos  3.9     09-16  Mg     2.1     09-16    TPro  6.4  /  Alb  4.0  /  TBili  0.2  /  DBili  x   /  AST  17  /  ALT  17  /  AlkPhos  36<L>  09-16    PT/INR - ( 16 Sep 2023 07:13 )   PT: 10.2 sec;   INR: 0.97 ratio         PTT - ( 16 Sep 2023 07:13 )  PTT:29.4 sec  Urinalysis Basic - ( 16 Sep 2023 07:01 )    Color: x / Appearance: x / SG: x / pH: x  Gluc: 122 mg/dL / Ketone: x  / Bili: x / Urobili: x   Blood: x / Protein: x / Nitrite: x   Leuk Esterase: x / RBC: x / WBC x   Sq Epi: x / Non Sq Epi: x / Bacteria: x      Arterial Blood Gas:  09-16 @ 04:28  7.33/54/53/28/83.9/1.5  ABG lactate: --        MICROBIOLOGY:     RADIOLOGY:  [ ] Reviewed and interpreted by me    Point of Care Ultrasound Findings:    PFT:    EKG: CHIEF COMPLAINT: f/up for SOB, copd, pulmonary clearance--feels ok-breathing better-some reflux    Interval Events: for surgery today?    REVIEW OF SYSTEMS:  Constitutional: No fevers or chills. No weight loss. No fatigue or generalized malaise.  Eyes: No itching or discharge from the eyes  ENT: No ear pain. No ear discharge. No nasal congestion. No post nasal drip. No epistaxis. No throat pain. No sore throat. No difficulty swallowing.   CV: No chest pain. No palpitations. No lightheadedness or dizziness.   Resp: No dyspnea at rest. No dyspnea on exertion. No orthopnea. No wheezing. No cough. No stridor. No sputum production. No chest pain with respiration.  GI: No nausea. No vomiting. No diarrhea.  MSK: No joint pain or pain in any extremities  Integumentary: No skin lesions. No pedal edema.  Neurological: No gross motor weakness. No sensory changes.  [+ ] All other systems negative  [ ] Unable to assess ROS because ________    OBJECTIVE:  ICU Vital Signs Last 24 Hrs  T(C): 37.1 (17 Sep 2023 05:35), Max: 37.1 (17 Sep 2023 05:35)  T(F): 98.7 (17 Sep 2023 05:35), Max: 98.7 (17 Sep 2023 05:35)  HR: 87 (17 Sep 2023 05:35) (78 - 90)  BP: 106/68 (17 Sep 2023 05:35) (106/68 - 144/72)  BP(mean): 88 (16 Sep 2023 14:24) (88 - 88)  ABP: --  ABP(mean): --  RR: 18 (17 Sep 2023 05:35) (18 - 18)  SpO2: 96% (17 Sep 2023 05:35) (92% - 96%)    O2 Parameters below as of 17 Sep 2023 05:35  Patient On (Oxygen Delivery Method): nasal cannula  O2 Flow (L/min): 3            09-15 @ 07:01  -  09-16 @ 07:00  --------------------------------------------------------  IN: 980 mL / OUT: 1850 mL / NET: -870 mL    09-16 @ 07:01  -  09-17 @ 05:56  --------------------------------------------------------  IN: 0 mL / OUT: 1450 mL / NET: -1450 mL      CAPILLARY BLOOD GLUCOSE      POCT Blood Glucose.: 145 mg/dL (17 Sep 2023 05:08)      PHYSICAL EXAM: NAD in bed on NC  General: Awake, alert, oriented X 3.   HEENT: Atraumatic, normocephalic.                 Mallampatti Grade 3                No nasal congestion.                No tonsillar or pharyngeal exudates.  Lymph Nodes: No palpable lymphadenopathy  Neck: No JVD. No carotid bruit.   Respiratory: Normal chest expansion                         Normal percussion                         Normal and equal air entry                         No wheeze, rhonchi or rales.  Cardiovascular: S1 S2 normal. No murmurs, rubs or gallops.   Abdomen: Soft, non-tender, non-distended. No organomegaly. Normoactive bowel sounds.  Extremities: Warm to touch. Peripheral pulse palpable. No pedal edema.   Skin: No rashes or skin lesions  Neurological: Motor and sensory examination equal and normal in all four extremities.  Psychiatry: Appropriate mood and affect.    HOSPITAL MEDICATIONS:  MEDICATIONS  (STANDING):  acetaminophen   IVPB .. 1000 milliGRAM(s) IV Intermittent every 6 hours  albuterol/ipratropium for Nebulization 3 milliLiter(s) Nebulizer every 4 hours  budesonide 160 MICROgram(s)/formoterol 4.5 MICROgram(s) Inhaler 2 Puff(s) Inhalation two times a day  cefTRIAXone   IVPB 1000 milliGRAM(s) IV Intermittent every 24 hours  clonazePAM  Tablet 0.5 milliGRAM(s) Oral every 12 hours  dextrose 5%. 1000 milliLiter(s) (100 mL/Hr) IV Continuous <Continuous>  dextrose 5%. 1000 milliLiter(s) (50 mL/Hr) IV Continuous <Continuous>  dextrose 50% Injectable 25 Gram(s) IV Push once  dextrose 50% Injectable 12.5 Gram(s) IV Push once  dextrose 50% Injectable 25 Gram(s) IV Push once  enoxaparin Injectable 40 milliGRAM(s) SubCutaneous every 24 hours  glucagon  Injectable 1 milliGRAM(s) IntraMuscular once  insulin lispro (ADMELOG) corrective regimen sliding scale   SubCutaneous every 6 hours  lactated ringers. 1000 milliLiter(s) (80 mL/Hr) IV Continuous <Continuous>  mirtazapine 45 milliGRAM(s) Oral at bedtime  PARoxetine 30 milliGRAM(s) Oral daily  QUEtiapine 100 milliGRAM(s) Oral at bedtime  simvastatin 10 milliGRAM(s) Oral at bedtime  tiotropium 2.5 MICROgram(s) Inhaler 2 Puff(s) Inhalation daily    MEDICATIONS  (PRN):  dextrose Oral Gel 15 Gram(s) Oral once PRN Blood Glucose LESS THAN 70 milliGRAM(s)/deciliter      LABS:                        12.3   4.66  )-----------( 265      ( 16 Sep 2023 07:16 )             38.3     09-16    142  |  104  |  23  ----------------------------<  122<H>  4.2   |  24  |  0.68    Ca    9.1      16 Sep 2023 07:01  Phos  3.9     09-16  Mg     2.1     09-16    TPro  6.4  /  Alb  4.0  /  TBili  0.2  /  DBili  x   /  AST  17  /  ALT  17  /  AlkPhos  36<L>  09-16    PT/INR - ( 16 Sep 2023 07:13 )   PT: 10.2 sec;   INR: 0.97 ratio         PTT - ( 16 Sep 2023 07:13 )  PTT:29.4 sec  Urinalysis Basic - ( 16 Sep 2023 07:01 )    Color: x / Appearance: x / SG: x / pH: x  Gluc: 122 mg/dL / Ketone: x  / Bili: x / Urobili: x   Blood: x / Protein: x / Nitrite: x   Leuk Esterase: x / RBC: x / WBC x   Sq Epi: x / Non Sq Epi: x / Bacteria: x      Arterial Blood Gas:  09-16 @ 04:28  7.33/54/53/28/83.9/1.5  ABG lactate: --        MICROBIOLOGY:     RADIOLOGY:  [ ] Reviewed and interpreted by me    Point of Care Ultrasound Findings:    PFT:    EKG:

## 2023-09-17 NOTE — PROGRESS NOTE ADULT - SUBJECTIVE AND OBJECTIVE BOX
ACS DAILY PROGRESS NOTE    SUBJECTIVE:  Patient seen and examined at bedside during morning rounds. No overnight events. Pain is controlled. Patient denies subjective fever/chills, CP, SOB, or n/v.       ICU Vital Signs Last 24 Hrs  T(C): 36.8 (17 Sep 2023 09:21), Max: 37.1 (17 Sep 2023 05:35)  T(F): 98.3 (17 Sep 2023 09:21), Max: 98.7 (17 Sep 2023 05:35)  HR: 79 (17 Sep 2023 09:21) (78 - 90)  BP: 152/72 (17 Sep 2023 09:21) (106/68 - 152/72)  BP(mean): 88 (16 Sep 2023 14:24) (88 - 88)  RR: 18 (17 Sep 2023 09:21) (18 - 18)  SpO2: 97% (17 Sep 2023 09:21) (92% - 97%)    O2 Parameters below as of 17 Sep 2023 09:21  Patient On (Oxygen Delivery Method): nasal cannula      I&O's Summary    16 Sep 2023 07:01  -  17 Sep 2023 07:00  --------------------------------------------------------  IN: 960 mL / OUT: 1900 mL / NET: -940 mL    17 Sep 2023 07:01  -  17 Sep 2023 11:31  --------------------------------------------------------  IN: 320 mL / OUT: 300 mL / NET: 20 mL        PHYSICAL EXAM:  GENERAL: NAD, resting comfortably in bed  HEAD: Normocephalic, atraumatic   LUNG: Nonlabored breathing.  ABD: Soft, nondistended. Nontender.  EXT: WWP  NEURO: Responds appropriately      LABS:  cret                        12.2   4.58  )-----------( 273      ( 17 Sep 2023 07:23 )             37.9     09-17    142  |  108  |  16  ----------------------------<  118<H>  4.4   |  23  |  0.69    Ca    9.3      17 Sep 2023 07:22  Phos  3.1     09-17  Mg     1.8     09-17    TPro  6.4  /  Alb  4.0  /  TBili  0.2  /  DBili  x   /  AST  17  /  ALT  17  /  AlkPhos  36<L>  09-16    PT/INR - ( 16 Sep 2023 07:13 )   PT: 10.2 sec;   INR: 0.97 ratio         PTT - ( 16 Sep 2023 07:13 )  PTT:29.4 sec    MEDICATIONS  (STANDING):  acetaminophen   IVPB .. 1000 milliGRAM(s) IV Intermittent once  albuterol/ipratropium for Nebulization 3 milliLiter(s) Nebulizer every 4 hours  budesonide 160 MICROgram(s)/formoterol 4.5 MICROgram(s) Inhaler 2 Puff(s) Inhalation two times a day  cefTRIAXone   IVPB 1000 milliGRAM(s) IV Intermittent every 24 hours  clonazePAM  Tablet 0.5 milliGRAM(s) Oral every 12 hours  dextrose 5%. 1000 milliLiter(s) (50 mL/Hr) IV Continuous <Continuous>  dextrose 5%. 1000 milliLiter(s) (100 mL/Hr) IV Continuous <Continuous>  dextrose 50% Injectable 25 Gram(s) IV Push once  dextrose 50% Injectable 25 Gram(s) IV Push once  dextrose 50% Injectable 12.5 Gram(s) IV Push once  enoxaparin Injectable 40 milliGRAM(s) SubCutaneous every 24 hours  glucagon  Injectable 1 milliGRAM(s) IntraMuscular once  insulin lispro (ADMELOG) corrective regimen sliding scale   SubCutaneous every 6 hours  lactated ringers. 1000 milliLiter(s) (80 mL/Hr) IV Continuous <Continuous>  mirtazapine 45 milliGRAM(s) Oral at bedtime  PARoxetine 30 milliGRAM(s) Oral daily  QUEtiapine 100 milliGRAM(s) Oral at bedtime  simvastatin 10 milliGRAM(s) Oral at bedtime  tiotropium 2.5 MICROgram(s) Inhaler 2 Puff(s) Inhalation daily    MEDICATIONS  (PRN):  dextrose Oral Gel 15 Gram(s) Oral once PRN Blood Glucose LESS THAN 70 milliGRAM(s)/deciliter

## 2023-09-18 ENCOUNTER — RESULT REVIEW (OUTPATIENT)
Age: 68
End: 2023-09-18

## 2023-09-18 ENCOUNTER — TRANSCRIPTION ENCOUNTER (OUTPATIENT)
Age: 68
End: 2023-09-18

## 2023-09-18 LAB
ANION GAP SERPL CALC-SCNC: 11 MMOL/L — SIGNIFICANT CHANGE UP (ref 5–17)
APTT BLD: 29.2 SEC — SIGNIFICANT CHANGE UP (ref 24.5–35.6)
BASE EXCESS BLDV CALC-SCNC: 0.3 MMOL/L — SIGNIFICANT CHANGE UP (ref -2–3)
BLD GP AB SCN SERPL QL: NEGATIVE — SIGNIFICANT CHANGE UP
BUN SERPL-MCNC: 13 MG/DL — SIGNIFICANT CHANGE UP (ref 7–23)
CA-I SERPL-SCNC: 1.29 MMOL/L — SIGNIFICANT CHANGE UP (ref 1.15–1.33)
CALCIUM SERPL-MCNC: 9.3 MG/DL — SIGNIFICANT CHANGE UP (ref 8.4–10.5)
CHLORIDE BLDV-SCNC: 105 MMOL/L — SIGNIFICANT CHANGE UP (ref 96–108)
CHLORIDE SERPL-SCNC: 106 MMOL/L — SIGNIFICANT CHANGE UP (ref 96–108)
CO2 BLDV-SCNC: 29 MMOL/L — HIGH (ref 22–26)
CO2 SERPL-SCNC: 25 MMOL/L — SIGNIFICANT CHANGE UP (ref 22–31)
CREAT SERPL-MCNC: 0.65 MG/DL — SIGNIFICANT CHANGE UP (ref 0.5–1.3)
EGFR: 96 ML/MIN/1.73M2 — SIGNIFICANT CHANGE UP
GAS PNL BLDV: 139 MMOL/L — SIGNIFICANT CHANGE UP (ref 136–145)
GAS PNL BLDV: SIGNIFICANT CHANGE UP
GLUCOSE BLDC GLUCOMTR-MCNC: 109 MG/DL — HIGH (ref 70–99)
GLUCOSE BLDC GLUCOMTR-MCNC: 133 MG/DL — HIGH (ref 70–99)
GLUCOSE BLDC GLUCOMTR-MCNC: 159 MG/DL — HIGH (ref 70–99)
GLUCOSE BLDC GLUCOMTR-MCNC: 179 MG/DL — HIGH (ref 70–99)
GLUCOSE BLDV-MCNC: 127 MG/DL — HIGH (ref 70–99)
GLUCOSE SERPL-MCNC: 125 MG/DL — HIGH (ref 70–99)
HCO3 BLDV-SCNC: 27 MMOL/L — SIGNIFICANT CHANGE UP (ref 22–29)
HCT VFR BLD CALC: 40.6 % — SIGNIFICANT CHANGE UP (ref 34.5–45)
HCT VFR BLDA CALC: 40 % — SIGNIFICANT CHANGE UP (ref 34.5–46.5)
HGB BLD CALC-MCNC: 13.2 G/DL — SIGNIFICANT CHANGE UP (ref 11.7–16.1)
HGB BLD-MCNC: 13 G/DL — SIGNIFICANT CHANGE UP (ref 11.5–15.5)
INR BLD: 0.98 RATIO — SIGNIFICANT CHANGE UP (ref 0.85–1.18)
LACTATE BLDV-MCNC: 1.1 MMOL/L — SIGNIFICANT CHANGE UP (ref 0.5–2)
MAGNESIUM SERPL-MCNC: 2 MG/DL — SIGNIFICANT CHANGE UP (ref 1.6–2.6)
MCHC RBC-ENTMCNC: 30 PG — SIGNIFICANT CHANGE UP (ref 27–34)
MCHC RBC-ENTMCNC: 32 GM/DL — SIGNIFICANT CHANGE UP (ref 32–36)
MCV RBC AUTO: 93.5 FL — SIGNIFICANT CHANGE UP (ref 80–100)
NRBC # BLD: 0 /100 WBCS — SIGNIFICANT CHANGE UP (ref 0–0)
PCO2 BLDV: 53 MMHG — HIGH (ref 39–42)
PH BLDV: 7.32 — SIGNIFICANT CHANGE UP (ref 7.32–7.43)
PHOSPHATE SERPL-MCNC: 3.6 MG/DL — SIGNIFICANT CHANGE UP (ref 2.5–4.5)
PLATELET # BLD AUTO: 294 K/UL — SIGNIFICANT CHANGE UP (ref 150–400)
PO2 BLDV: 71 MMHG — HIGH (ref 25–45)
POTASSIUM BLDV-SCNC: 4.1 MMOL/L — SIGNIFICANT CHANGE UP (ref 3.5–5.1)
POTASSIUM SERPL-MCNC: 4.1 MMOL/L — SIGNIFICANT CHANGE UP (ref 3.5–5.3)
POTASSIUM SERPL-SCNC: 4.1 MMOL/L — SIGNIFICANT CHANGE UP (ref 3.5–5.3)
PROTHROM AB SERPL-ACNC: 10.3 SEC — SIGNIFICANT CHANGE UP (ref 9.5–13)
RBC # BLD: 4.34 M/UL — SIGNIFICANT CHANGE UP (ref 3.8–5.2)
RBC # FLD: 13.4 % — SIGNIFICANT CHANGE UP (ref 10.3–14.5)
RH IG SCN BLD-IMP: POSITIVE — SIGNIFICANT CHANGE UP
SAO2 % BLDV: 94.5 % — HIGH (ref 67–88)
SODIUM SERPL-SCNC: 142 MMOL/L — SIGNIFICANT CHANGE UP (ref 135–145)
WBC # BLD: 4.03 K/UL — SIGNIFICANT CHANGE UP (ref 3.8–10.5)
WBC # FLD AUTO: 4.03 K/UL — SIGNIFICANT CHANGE UP (ref 3.8–10.5)

## 2023-09-18 PROCEDURE — 88304 TISSUE EXAM BY PATHOLOGIST: CPT | Mod: 26

## 2023-09-18 PROCEDURE — 99232 SBSQ HOSP IP/OBS MODERATE 35: CPT | Mod: 57

## 2023-09-18 PROCEDURE — 47562 LAPAROSCOPIC CHOLECYSTECTOMY: CPT | Mod: GC

## 2023-09-18 DEVICE — LIGATING CLIPS WECK HEMOLOK POLYMER MEDIUM-LARGE (GREEN) 6: Type: IMPLANTABLE DEVICE | Status: FUNCTIONAL

## 2023-09-18 RX ORDER — ENOXAPARIN SODIUM 100 MG/ML
40 INJECTION SUBCUTANEOUS EVERY 24 HOURS
Refills: 0 | Status: DISCONTINUED | OUTPATIENT
Start: 2023-09-18 | End: 2023-09-19

## 2023-09-18 RX ORDER — OXYCODONE HYDROCHLORIDE 5 MG/1
5 TABLET ORAL EVERY 6 HOURS
Refills: 0 | Status: DISCONTINUED | OUTPATIENT
Start: 2023-09-18 | End: 2023-09-19

## 2023-09-18 RX ORDER — DEXTROSE 50 % IN WATER 50 %
15 SYRINGE (ML) INTRAVENOUS ONCE
Refills: 0 | Status: DISCONTINUED | OUTPATIENT
Start: 2023-09-18 | End: 2023-09-19

## 2023-09-18 RX ORDER — INSULIN LISPRO 100/ML
VIAL (ML) SUBCUTANEOUS EVERY 6 HOURS
Refills: 0 | Status: DISCONTINUED | OUTPATIENT
Start: 2023-09-18 | End: 2023-09-18

## 2023-09-18 RX ORDER — SODIUM CHLORIDE 9 MG/ML
1000 INJECTION, SOLUTION INTRAVENOUS
Refills: 0 | Status: DISCONTINUED | OUTPATIENT
Start: 2023-09-18 | End: 2023-09-19

## 2023-09-18 RX ORDER — DEXTROSE 50 % IN WATER 50 %
12.5 SYRINGE (ML) INTRAVENOUS ONCE
Refills: 0 | Status: DISCONTINUED | OUTPATIENT
Start: 2023-09-18 | End: 2023-09-19

## 2023-09-18 RX ORDER — LANOLIN ALCOHOL/MO/W.PET/CERES
3 CREAM (GRAM) TOPICAL ONCE
Refills: 0 | Status: COMPLETED | OUTPATIENT
Start: 2023-09-18 | End: 2023-09-18

## 2023-09-18 RX ORDER — SIMVASTATIN 20 MG/1
10 TABLET, FILM COATED ORAL AT BEDTIME
Refills: 0 | Status: DISCONTINUED | OUTPATIENT
Start: 2023-09-18 | End: 2023-09-19

## 2023-09-18 RX ORDER — DEXTROSE 50 % IN WATER 50 %
25 SYRINGE (ML) INTRAVENOUS ONCE
Refills: 0 | Status: DISCONTINUED | OUTPATIENT
Start: 2023-09-18 | End: 2023-09-19

## 2023-09-18 RX ORDER — ONDANSETRON 8 MG/1
4 TABLET, FILM COATED ORAL ONCE
Refills: 0 | Status: DISCONTINUED | OUTPATIENT
Start: 2023-09-18 | End: 2023-09-18

## 2023-09-18 RX ORDER — INSULIN LISPRO 100/ML
VIAL (ML) SUBCUTANEOUS
Refills: 0 | Status: DISCONTINUED | OUTPATIENT
Start: 2023-09-18 | End: 2023-09-19

## 2023-09-18 RX ORDER — GLUCAGON INJECTION, SOLUTION 0.5 MG/.1ML
1 INJECTION, SOLUTION SUBCUTANEOUS ONCE
Refills: 0 | Status: DISCONTINUED | OUTPATIENT
Start: 2023-09-18 | End: 2023-09-19

## 2023-09-18 RX ORDER — MIRTAZAPINE 45 MG/1
45 TABLET, ORALLY DISINTEGRATING ORAL AT BEDTIME
Refills: 0 | Status: DISCONTINUED | OUTPATIENT
Start: 2023-09-18 | End: 2023-09-19

## 2023-09-18 RX ORDER — TIOTROPIUM BROMIDE 18 UG/1
2 CAPSULE ORAL; RESPIRATORY (INHALATION) DAILY
Refills: 0 | Status: DISCONTINUED | OUTPATIENT
Start: 2023-09-18 | End: 2023-09-19

## 2023-09-18 RX ORDER — OXYCODONE HYDROCHLORIDE 5 MG/1
2.5 TABLET ORAL EVERY 6 HOURS
Refills: 0 | Status: DISCONTINUED | OUTPATIENT
Start: 2023-09-18 | End: 2023-09-19

## 2023-09-18 RX ORDER — IPRATROPIUM/ALBUTEROL SULFATE 18-103MCG
3 AEROSOL WITH ADAPTER (GRAM) INHALATION EVERY 4 HOURS
Refills: 0 | Status: DISCONTINUED | OUTPATIENT
Start: 2023-09-18 | End: 2023-09-19

## 2023-09-18 RX ORDER — OXYCODONE HYDROCHLORIDE 5 MG/1
5 TABLET ORAL ONCE
Refills: 0 | Status: DISCONTINUED | OUTPATIENT
Start: 2023-09-18 | End: 2023-09-18

## 2023-09-18 RX ORDER — BUDESONIDE AND FORMOTEROL FUMARATE DIHYDRATE 160; 4.5 UG/1; UG/1
2 AEROSOL RESPIRATORY (INHALATION)
Refills: 0 | Status: DISCONTINUED | OUTPATIENT
Start: 2023-09-18 | End: 2023-09-19

## 2023-09-18 RX ORDER — CLONAZEPAM 1 MG
0.5 TABLET ORAL EVERY 12 HOURS
Refills: 0 | Status: DISCONTINUED | OUTPATIENT
Start: 2023-09-18 | End: 2023-09-19

## 2023-09-18 RX ORDER — QUETIAPINE FUMARATE 200 MG/1
100 TABLET, FILM COATED ORAL AT BEDTIME
Refills: 0 | Status: DISCONTINUED | OUTPATIENT
Start: 2023-09-18 | End: 2023-09-19

## 2023-09-18 RX ORDER — INSULIN LISPRO 100/ML
VIAL (ML) SUBCUTANEOUS
Refills: 0 | Status: DISCONTINUED | OUTPATIENT
Start: 2023-09-18 | End: 2023-09-18

## 2023-09-18 RX ORDER — INSULIN LISPRO 100/ML
VIAL (ML) SUBCUTANEOUS AT BEDTIME
Refills: 0 | Status: DISCONTINUED | OUTPATIENT
Start: 2023-09-18 | End: 2023-09-19

## 2023-09-18 RX ORDER — ACETAMINOPHEN 500 MG
1000 TABLET ORAL EVERY 6 HOURS
Refills: 0 | Status: DISCONTINUED | OUTPATIENT
Start: 2023-09-18 | End: 2023-09-19

## 2023-09-18 RX ORDER — FENTANYL CITRATE 50 UG/ML
25 INJECTION INTRAVENOUS
Refills: 0 | Status: DISCONTINUED | OUTPATIENT
Start: 2023-09-18 | End: 2023-09-18

## 2023-09-18 RX ORDER — OXYCODONE HYDROCHLORIDE 5 MG/1
2.5 TABLET ORAL ONCE
Refills: 0 | Status: DISCONTINUED | OUTPATIENT
Start: 2023-09-18 | End: 2023-09-18

## 2023-09-18 RX ORDER — HYDROMORPHONE HYDROCHLORIDE 2 MG/ML
0.25 INJECTION INTRAMUSCULAR; INTRAVENOUS; SUBCUTANEOUS
Refills: 0 | Status: COMPLETED | OUTPATIENT
Start: 2023-09-18 | End: 2023-09-18

## 2023-09-18 RX ADMIN — Medication 30 MILLIGRAM(S): at 17:44

## 2023-09-18 RX ADMIN — HYDROMORPHONE HYDROCHLORIDE 0.25 MILLIGRAM(S): 2 INJECTION INTRAMUSCULAR; INTRAVENOUS; SUBCUTANEOUS at 12:52

## 2023-09-18 RX ADMIN — FENTANYL CITRATE 25 MICROGRAM(S): 50 INJECTION INTRAVENOUS at 11:20

## 2023-09-18 RX ADMIN — SIMVASTATIN 10 MILLIGRAM(S): 20 TABLET, FILM COATED ORAL at 21:33

## 2023-09-18 RX ADMIN — FENTANYL CITRATE 25 MICROGRAM(S): 50 INJECTION INTRAVENOUS at 11:55

## 2023-09-18 RX ADMIN — QUETIAPINE FUMARATE 100 MILLIGRAM(S): 200 TABLET, FILM COATED ORAL at 21:33

## 2023-09-18 RX ADMIN — Medication 1000 MILLIGRAM(S): at 16:30

## 2023-09-18 RX ADMIN — OXYCODONE HYDROCHLORIDE 5 MILLIGRAM(S): 5 TABLET ORAL at 11:50

## 2023-09-18 RX ADMIN — HYDROMORPHONE HYDROCHLORIDE 0.25 MILLIGRAM(S): 2 INJECTION INTRAMUSCULAR; INTRAVENOUS; SUBCUTANEOUS at 13:20

## 2023-09-18 RX ADMIN — HYDROMORPHONE HYDROCHLORIDE 0.25 MILLIGRAM(S): 2 INJECTION INTRAMUSCULAR; INTRAVENOUS; SUBCUTANEOUS at 13:06

## 2023-09-18 RX ADMIN — MIRTAZAPINE 45 MILLIGRAM(S): 45 TABLET, ORALLY DISINTEGRATING ORAL at 21:33

## 2023-09-18 RX ADMIN — Medication 3 MILLILITER(S): at 17:44

## 2023-09-18 RX ADMIN — HYDROMORPHONE HYDROCHLORIDE 0.25 MILLIGRAM(S): 2 INJECTION INTRAMUSCULAR; INTRAVENOUS; SUBCUTANEOUS at 13:05

## 2023-09-18 RX ADMIN — Medication 3 MILLIGRAM(S): at 23:43

## 2023-09-18 RX ADMIN — Medication 1000 MILLIGRAM(S): at 22:13

## 2023-09-18 RX ADMIN — Medication 0.5 MILLIGRAM(S): at 05:40

## 2023-09-18 RX ADMIN — SODIUM CHLORIDE 90 MILLILITER(S): 9 INJECTION, SOLUTION INTRAVENOUS at 05:40

## 2023-09-18 RX ADMIN — OXYCODONE HYDROCHLORIDE 5 MILLIGRAM(S): 5 TABLET ORAL at 20:31

## 2023-09-18 RX ADMIN — Medication 1000 MILLIGRAM(S): at 15:26

## 2023-09-18 RX ADMIN — OXYCODONE HYDROCHLORIDE 5 MILLIGRAM(S): 5 TABLET ORAL at 19:31

## 2023-09-18 RX ADMIN — OXYCODONE HYDROCHLORIDE 5 MILLIGRAM(S): 5 TABLET ORAL at 12:20

## 2023-09-18 RX ADMIN — ENOXAPARIN SODIUM 40 MILLIGRAM(S): 100 INJECTION SUBCUTANEOUS at 21:33

## 2023-09-18 RX ADMIN — Medication 0.5 MILLIGRAM(S): at 17:44

## 2023-09-18 RX ADMIN — BUDESONIDE AND FORMOTEROL FUMARATE DIHYDRATE 2 PUFF(S): 160; 4.5 AEROSOL RESPIRATORY (INHALATION) at 05:40

## 2023-09-18 RX ADMIN — OXYCODONE HYDROCHLORIDE 2.5 MILLIGRAM(S): 5 TABLET ORAL at 23:42

## 2023-09-18 RX ADMIN — BUDESONIDE AND FORMOTEROL FUMARATE DIHYDRATE 2 PUFF(S): 160; 4.5 AEROSOL RESPIRATORY (INHALATION) at 17:45

## 2023-09-18 RX ADMIN — Medication 3 MILLILITER(S): at 21:33

## 2023-09-18 RX ADMIN — Medication 1000 MILLIGRAM(S): at 21:33

## 2023-09-18 NOTE — DISCHARGE NOTE PROVIDER - NSDCCPCAREPLAN_GEN_ALL_CORE_FT
PRINCIPAL DISCHARGE DIAGNOSIS  Diagnosis: Cholelithiases  Assessment and Plan of Treatment: WOUND CARE: -Covering your incisions are white pieces of tape called steri-strips. You can shower with these and they will curl up and begin to fall off on their own in about 7 days.   BATHING: Please do not submerge wound underwater. You may shower and/or sponge bathe.  ACTIVITY: No heavy lifting or straining. Otherwise, you may return to your usual level of physical activity. If you are taking narcotic pain medication (such as Percocet), do NOT drive a car, operate machinery or make important decisions.  DIET: Return to your usual diet.  NOTIFY YOUR SURGEON IF: You have any bleeding that does not stop, any pus draining from your wound, any fever (over 100.4 F) or chills, persistent nausea/vomiting, persistent diarrhea, or if your pain is not controlled on your discharge pain medications.  FOLLOW-UP:  1. Follow-up with Dr. León or one of their partners: Dr. Sin, Dr. Palomino, Dr. Mendieta, Dr. Iglesias, Dr. Cadena, Dr. Chau,  Dr. Thomas, Dr. Pederson, Dr. Borja or Dr. Tucker.  Please call [phone] to schedule an appointment in 1-2 weeks  within 1-2 weeks of discharge.  Please call office for appointment  2. Please follow up with your primary care physician in one week regarding your hospitalization.

## 2023-09-18 NOTE — PROGRESS NOTE ADULT - ASSESSMENT
cholelithiasis with chronic cholecystitis and intractable biliary colic  -The risks (bleeding, infection, bile duct injury, retained bile duct stone or bile leak requiring endoscopy), benefits and alternatives of laparoscopic (possible open) cholecystectomy were discussed with the patient. Written informed consent was obtained for urgent surgery.    I reviewed her labs and radiologic images.   8/22 - 8/24/2023 - admitted ot [facility] for symptomatic cholelithiasis and new-onset COPD    cholelithiasis with chronic cholecystitis and intractable biliary colic  -The risks (bleeding, infection, bile duct injury, retained bile duct stone or bile leak requiring endoscopy), benefits and alternatives of laparoscopic (possible open) cholecystectomy were discussed with the patient. Written informed consent was obtained for urgent surgery.    I reviewed her labs and radiologic images.

## 2023-09-18 NOTE — PRE-ANESTHESIA EVALUATION ADULT - NSANTHOSAYNRD_GEN_A_CORE
No. JORDEN screening performed.  STOP BANG Legend: 0-2 = LOW Risk; 3-4 = INTERMEDIATE Risk; 5-8 = HIGH Risk
No. JORDEN screening performed.  STOP BANG Legend: 0-2 = LOW Risk; 3-4 = INTERMEDIATE Risk; 5-8 = HIGH Risk

## 2023-09-18 NOTE — DISCHARGE NOTE PROVIDER - CARE PROVIDERS DIRECT ADDRESSES
,[email or web address]

## 2023-09-18 NOTE — DISCHARGE NOTE PROVIDER - NSDCHC_MEDRECSTATUS_GEN_ALL_CORE
Home Admission Reconciliation is Completed  Discharge Reconciliation is Not Complete Admission Reconciliation is Completed  Discharge Reconciliation is Completed

## 2023-09-18 NOTE — DISCHARGE NOTE PROVIDER - CARE PROVIDER_API CALL
Taiwo León  Surgery  [address] [place] [address], [facility] [address]  [place] [address]  Phone: [phone]  Fax: [phone]  Follow Up Time: 2 weeks   Taiwo León  Surgery  [address], [address]  [place] [address]  Phone: [phone]  Fax: [phone]  Follow Up Time: 2 weeks    Willam Jones  Pulmonary Disease  [address], [facility] [address]  [place] [address]  Phone: [phone]  Fax: [phone]  Follow Up Time: 2 weeks

## 2023-09-18 NOTE — CHART NOTE - NSCHARTNOTEFT_GEN_A_CORE
Surgery Post-Op Note    Pre-Op Dx: Acute Choly  Procedure: Laparoscopic cholecystectomy      Subjective:   Pt seen and examined at the bedside. Pt w/ no complaints. Patient denies subjective fever/chills, CP, SOB, n/v, or abdominal pain. -Flatus/-BM. +Voiding. Pain well controlled, tolerating diet, +ambulating.    Vital Signs Last 24 Hrs  T(C): 36.6 (18 Sep 2023 14:47), Max: 37.1 (18 Sep 2023 00:39)  T(F): 97.8 (18 Sep 2023 14:47), Max: 98.7 (18 Sep 2023 00:39)  HR: 85 (18 Sep 2023 14:47) (67 - 85)  BP: 112/63 (18 Sep 2023 14:47) (112/63 - 181/96)  BP(mean): 106 (18 Sep 2023 13:30) (88 - 111)  RR: 18 (18 Sep 2023 14:47) (14 - 18)  SpO2: 94% (18 Sep 2023 14:47) (91% - 100%)    Parameters below as of 18 Sep 2023 14:47  Patient On (Oxygen Delivery Method): nasal cannula, 3        Physical Exam:  General Appearance: no acute distress, NTND   Chest: airway intact, non-labored breathing, 3L NC  CV: no JVD, palpable pulses b/l  Abdomen: soft, non-tender, non-distended, +BS, incision sites cdi   Extremities: WWP         LABS:                        13.0   4.03  )-----------( 294      ( 18 Sep 2023 07:44 )             40.6     09-18    142  |  106  |  13  ----------------------------<  125<H>  4.1   |  25  |  0.65    Ca    9.3      18 Sep 2023 07:42  Phos  3.6     09-18  Mg     2.0     09-18      PT/INR - ( 18 Sep 2023 07:43 )   PT: 10.3 sec;   INR: 0.98 ratio         PTT - ( 18 Sep 2023 07:43 )  PTT:29.2 sec  CAPILLARY BLOOD GLUCOSE      POCT Blood Glucose.: 159 mg/dL (18 Sep 2023 11:16)  POCT Blood Glucose.: 109 mg/dL (18 Sep 2023 05:39)  POCT Blood Glucose.: 102 mg/dL (17 Sep 2023 21:39)  POCT Blood Glucose.: 103 mg/dL (17 Sep 2023 17:20)    Urinalysis Basic - ( 18 Sep 2023 07:42 )    Color: x / Appearance: x / SG: x / pH: x  Gluc: 125 mg/dL / Ketone: x  / Bili: x / Urobili: x   Blood: x / Protein: x / Nitrite: x   Leuk Esterase: x / RBC: x / WBC x   Sq Epi: x / Non Sq Epi: x / Bacteria: x        ABO Interpretation: A (09-18 @ 08:58)        Radiology and Additional Studies:    Assessment:68y Female s/p above procedure    Plan:  IVF, Diet: regular diet   Pain/nausea control PRN  Incentive spirometer/OOB/Ambulate  Supp O2 goal to 88-92% given COPD history   DVT PPX

## 2023-09-18 NOTE — DISCHARGE NOTE PROVIDER - PROVIDER TOKENS
PROVIDER:[TOKEN:[[id number]],FOLLOWUP:[2 weeks]] PROVIDER:[TOKEN:[[id number]],FOLLOWUP:[2 weeks]],PROVIDER:[TOKEN:[[id number]],FOLLOWUP:[2 weeks]]

## 2023-09-18 NOTE — PROGRESS NOTE ADULT - SUBJECTIVE AND OBJECTIVE BOX
c/o RUQ pain    afeb  soft / mild RUQ tenderness / ND  no surgical scars on abdomen  no jaundice    WBC = 4  LFTs (9/16) - wnl    RUQ U/S (9/13) - cholelithiasis in a contracted gallbladder CBD = 6 mm.

## 2023-09-18 NOTE — DISCHARGE NOTE PROVIDER - HOSPITAL COURSE
69 y/o F with PMHx of COPD (has rx for home O2, does not use all the time), DM who presents to ED c/o right upper abdominal pain with radiation to the R back onset ~2 weeks ago. Pain was initially intermittent, now constant with associated nausea, green tinged vomiting and decreased PO intake. During her last admission, she had imaging which demonstrated gallstones and was told she would need her gallbladder out in the future, but it was not emergent. Given symptoms, was sent from outpatient provider for ACS consult and to r/o cholecystitis vs. choledocholithiasis. Denies fevers, chest pain, difficulty breathing, diarrhea or constipation, urinary symptoms. NKDA.    In ED, patient complains of RUQ pain, Normal WBC, US: cholelithiasis and adenomyomatosis, collapsed GB, no signs of acute cholecystitis.   Pulmonology was consulted for preop risk assessment given recent dx of COPD.  Recommendations were provided.  On 9/18 pt underwent laparoscopic cholecystectomy. She tolerated the procedure well, was extubated and sent to PACU in stable condition. The patient was hemodynamically stable and was transferred to a surgical floor. The patient's pain was controlled by IV pain medications and then by PO pain medications. The patient was advanced from clears to regular diet and tolerated it well.  The patient is ambulating, voiding, tolerating a regular diet, and pain is controlled with oral pain medications.  She was monitored for respiratory status and remained stable postoperatively.  Patient is stable for discharge home and will follow-up with Dr. León and her pulmonologist within 1-2 weeks.

## 2023-09-18 NOTE — PRE-ANESTHESIA EVALUATION ADULT - NSANTHPMHFT_GEN_ALL_CORE
severe COPD on 2L NC   Pulm clearance noted
68 year old female long time smoker (quit 3-4 months ago) with a history of newly diagnosed COPD as of 8/22/23 admission presents with symptoms consistent with biliary colic and is potentially planned for laparoscopic cholecystectomy.    Pulm consult in [place]. Appreciate recs.

## 2023-09-18 NOTE — BRIEF OPERATIVE NOTE - OPERATION/FINDINGS
Veress entry into abdomen in LUQ. 3 x 5mm ports on R costal margin and umbilicus and 11mm Versastep in subxiphoid. Critical view of safety obtained. Duct and artery ligated with 10mm Hemolock clips

## 2023-09-18 NOTE — DISCHARGE NOTE PROVIDER - NSDCMRMEDTOKEN_GEN_ALL_CORE_FT
Albuterol (Eqv-Proventil HFA) 90 mcg/inh inhalation aerosol: 2 puff(s) inhaled every 4 hours as needed for  shortness of breath and/or wheezing  clonazePAM 0.5 mg oral tablet: 1 tab(s) orally every 12 hours AM and PM  fenofibrate 160 mg oral tablet: 1 tab(s) orally once a day  Flovent Diskus 100 mcg/inh inhalation powder: 1 puff(s) inhaled 2 times a day  metFORMIN 500 mg oral tablet: 1 tab(s) orally 2 times a day  mirtazapine 45 mg oral tablet: 1 tab(s) orally once a day (at bedtime)  Myrbetriq 25 mg oral tablet, extended release: 1 tab(s) orally once a day  omeprazole 40 mg oral delayed release capsule: 1 cap(s) orally once a day  PARoxetine 30 mg oral tablet: 1 tab(s) orally once a day  pravastatin 20 mg oral tablet: 1 tab(s) orally once a day  Seroquel 100 mg oral tablet: 1 tab(s) orally once a day (at bedtime)   acetaminophen 500 mg oral tablet: 2 tab(s) orally every 6 hours  Albuterol (Eqv-Proventil HFA) 90 mcg/inh inhalation aerosol: 2 puff(s) inhaled every 4 hours as needed for  shortness of breath and/or wheezing  clonazePAM 0.5 mg oral tablet: 1 tab(s) orally every 12 hours AM and PM  fenofibrate 160 mg oral tablet: 1 tab(s) orally once a day  Flovent Diskus 100 mcg/inh inhalation powder: 1 puff(s) inhaled 2 times a day  metFORMIN 500 mg oral tablet: 1 tab(s) orally 2 times a day  mirtazapine 45 mg oral tablet: 1 tab(s) orally once a day (at bedtime)  Myrbetriq 25 mg oral tablet, extended release: 1 tab(s) orally once a day  omeprazole 40 mg oral delayed release capsule: 1 cap(s) orally once a day  PARoxetine 30 mg oral tablet: 1 tab(s) orally once a day  pravastatin 20 mg oral tablet: 1 tab(s) orally once a day  Seroquel 100 mg oral tablet: 1 tab(s) orally once a day (at bedtime)   acetaminophen 500 mg oral tablet: 2 tab(s) orally every 6 hours  Albuterol (Eqv-Proventil HFA) 90 mcg/inh inhalation aerosol: 2 puff(s) inhaled every 4 hours as needed for  shortness of breath and/or wheezing  clonazePAM 0.5 mg oral tablet: 1 tab(s) orally every 12 hours AM and PM  fenofibrate 160 mg oral tablet: 1 tab(s) orally once a day  Flovent Diskus 100 mcg/inh inhalation powder: 1 puff(s) inhaled 2 times a day  metFORMIN 500 mg oral tablet: 1 tab(s) orally 2 times a day  mirtazapine 45 mg oral tablet: 1 tab(s) orally once a day (at bedtime)  Myrbetriq 25 mg oral tablet, extended release: 1 tab(s) orally once a day  omeprazole 40 mg oral delayed release capsule: 1 cap(s) orally once a day  oxyCODONE 5 mg oral tablet: 1 tab(s) orally every 6 hours as needed for  severe pain MDD: 4  PARoxetine 30 mg oral tablet: 1 tab(s) orally once a day  pravastatin 20 mg oral tablet: 1 tab(s) orally once a day  Seroquel 100 mg oral tablet: 1 tab(s) orally once a day (at bedtime)

## 2023-09-19 ENCOUNTER — TRANSCRIPTION ENCOUNTER (OUTPATIENT)
Age: 68
End: 2023-09-19

## 2023-09-19 VITALS
DIASTOLIC BLOOD PRESSURE: 79 MMHG | TEMPERATURE: 98 F | SYSTOLIC BLOOD PRESSURE: 139 MMHG | HEART RATE: 79 BPM | OXYGEN SATURATION: 93 % | RESPIRATION RATE: 18 BRPM

## 2023-09-19 LAB
ALBUMIN SERPL ELPH-MCNC: 4 G/DL — SIGNIFICANT CHANGE UP (ref 3.3–5)
ALP SERPL-CCNC: 37 U/L — LOW (ref 40–120)
ALT FLD-CCNC: 28 U/L — SIGNIFICANT CHANGE UP (ref 10–45)
ANION GAP SERPL CALC-SCNC: 12 MMOL/L — SIGNIFICANT CHANGE UP (ref 5–17)
AST SERPL-CCNC: 32 U/L — SIGNIFICANT CHANGE UP (ref 10–40)
BILIRUB SERPL-MCNC: 0.3 MG/DL — SIGNIFICANT CHANGE UP (ref 0.2–1.2)
BUN SERPL-MCNC: 12 MG/DL — SIGNIFICANT CHANGE UP (ref 7–23)
CALCIUM SERPL-MCNC: 9.8 MG/DL — SIGNIFICANT CHANGE UP (ref 8.4–10.5)
CHLORIDE SERPL-SCNC: 105 MMOL/L — SIGNIFICANT CHANGE UP (ref 96–108)
CO2 SERPL-SCNC: 24 MMOL/L — SIGNIFICANT CHANGE UP (ref 22–31)
CREAT SERPL-MCNC: 0.55 MG/DL — SIGNIFICANT CHANGE UP (ref 0.5–1.3)
EGFR: 100 ML/MIN/1.73M2 — SIGNIFICANT CHANGE UP
GLUCOSE BLDC GLUCOMTR-MCNC: 117 MG/DL — HIGH (ref 70–99)
GLUCOSE BLDC GLUCOMTR-MCNC: 128 MG/DL — HIGH (ref 70–99)
GLUCOSE BLDC GLUCOMTR-MCNC: 145 MG/DL — HIGH (ref 70–99)
GLUCOSE SERPL-MCNC: 138 MG/DL — HIGH (ref 70–99)
HCT VFR BLD CALC: 36.2 % — SIGNIFICANT CHANGE UP (ref 34.5–45)
HGB BLD-MCNC: 11.9 G/DL — SIGNIFICANT CHANGE UP (ref 11.5–15.5)
MCHC RBC-ENTMCNC: 30.8 PG — SIGNIFICANT CHANGE UP (ref 27–34)
MCHC RBC-ENTMCNC: 32.9 GM/DL — SIGNIFICANT CHANGE UP (ref 32–36)
MCV RBC AUTO: 93.8 FL — SIGNIFICANT CHANGE UP (ref 80–100)
NRBC # BLD: 0 /100 WBCS — SIGNIFICANT CHANGE UP (ref 0–0)
PLATELET # BLD AUTO: 258 K/UL — SIGNIFICANT CHANGE UP (ref 150–400)
POTASSIUM SERPL-MCNC: 3.9 MMOL/L — SIGNIFICANT CHANGE UP (ref 3.5–5.3)
POTASSIUM SERPL-SCNC: 3.9 MMOL/L — SIGNIFICANT CHANGE UP (ref 3.5–5.3)
PROT SERPL-MCNC: 6.5 G/DL — SIGNIFICANT CHANGE UP (ref 6–8.3)
RBC # BLD: 3.86 M/UL — SIGNIFICANT CHANGE UP (ref 3.8–5.2)
RBC # FLD: 13.5 % — SIGNIFICANT CHANGE UP (ref 10.3–14.5)
SODIUM SERPL-SCNC: 141 MMOL/L — SIGNIFICANT CHANGE UP (ref 135–145)
WBC # BLD: 6.61 K/UL — SIGNIFICANT CHANGE UP (ref 3.8–10.5)
WBC # FLD AUTO: 6.61 K/UL — SIGNIFICANT CHANGE UP (ref 3.8–10.5)

## 2023-09-19 PROCEDURE — 83735 ASSAY OF MAGNESIUM: CPT

## 2023-09-19 PROCEDURE — 84295 ASSAY OF SERUM SODIUM: CPT

## 2023-09-19 PROCEDURE — 99285 EMERGENCY DEPT VISIT HI MDM: CPT | Mod: 25

## 2023-09-19 PROCEDURE — 84100 ASSAY OF PHOSPHORUS: CPT

## 2023-09-19 PROCEDURE — 71045 X-RAY EXAM CHEST 1 VIEW: CPT

## 2023-09-19 PROCEDURE — 96374 THER/PROPH/DIAG INJ IV PUSH: CPT

## 2023-09-19 PROCEDURE — 83605 ASSAY OF LACTIC ACID: CPT

## 2023-09-19 PROCEDURE — 84132 ASSAY OF SERUM POTASSIUM: CPT

## 2023-09-19 PROCEDURE — C1889: CPT

## 2023-09-19 PROCEDURE — 85025 COMPLETE CBC W/AUTO DIFF WBC: CPT

## 2023-09-19 PROCEDURE — 83036 HEMOGLOBIN GLYCOSYLATED A1C: CPT

## 2023-09-19 PROCEDURE — 85014 HEMATOCRIT: CPT

## 2023-09-19 PROCEDURE — 85610 PROTHROMBIN TIME: CPT

## 2023-09-19 PROCEDURE — 81001 URINALYSIS AUTO W/SCOPE: CPT

## 2023-09-19 PROCEDURE — 80053 COMPREHEN METABOLIC PANEL: CPT

## 2023-09-19 PROCEDURE — 82962 GLUCOSE BLOOD TEST: CPT

## 2023-09-19 PROCEDURE — 83690 ASSAY OF LIPASE: CPT

## 2023-09-19 PROCEDURE — 36415 COLL VENOUS BLD VENIPUNCTURE: CPT

## 2023-09-19 PROCEDURE — 99024 POSTOP FOLLOW-UP VISIT: CPT

## 2023-09-19 PROCEDURE — 86901 BLOOD TYPING SEROLOGIC RH(D): CPT

## 2023-09-19 PROCEDURE — 85027 COMPLETE CBC AUTOMATED: CPT

## 2023-09-19 PROCEDURE — 82330 ASSAY OF CALCIUM: CPT

## 2023-09-19 PROCEDURE — 82435 ASSAY OF BLOOD CHLORIDE: CPT

## 2023-09-19 PROCEDURE — 84484 ASSAY OF TROPONIN QUANT: CPT

## 2023-09-19 PROCEDURE — 82803 BLOOD GASES ANY COMBINATION: CPT

## 2023-09-19 PROCEDURE — 76705 ECHO EXAM OF ABDOMEN: CPT

## 2023-09-19 PROCEDURE — 94640 AIRWAY INHALATION TREATMENT: CPT

## 2023-09-19 PROCEDURE — C9399: CPT

## 2023-09-19 PROCEDURE — 85730 THROMBOPLASTIN TIME PARTIAL: CPT

## 2023-09-19 PROCEDURE — 86803 HEPATITIS C AB TEST: CPT

## 2023-09-19 PROCEDURE — 82947 ASSAY GLUCOSE BLOOD QUANT: CPT

## 2023-09-19 PROCEDURE — 80048 BASIC METABOLIC PNL TOTAL CA: CPT

## 2023-09-19 PROCEDURE — 85018 HEMOGLOBIN: CPT

## 2023-09-19 PROCEDURE — 88304 TISSUE EXAM BY PATHOLOGIST: CPT

## 2023-09-19 PROCEDURE — 86850 RBC ANTIBODY SCREEN: CPT

## 2023-09-19 PROCEDURE — 86900 BLOOD TYPING SEROLOGIC ABO: CPT

## 2023-09-19 PROCEDURE — 93005 ELECTROCARDIOGRAM TRACING: CPT

## 2023-09-19 RX ORDER — OXYCODONE HYDROCHLORIDE 5 MG/1
1 TABLET ORAL
Qty: 8 | Refills: 0
Start: 2023-09-19 | End: 2023-09-20

## 2023-09-19 RX ORDER — ACETAMINOPHEN 500 MG
2 TABLET ORAL
Qty: 0 | Refills: 0 | DISCHARGE
Start: 2023-09-19

## 2023-09-19 RX ADMIN — OXYCODONE HYDROCHLORIDE 2.5 MILLIGRAM(S): 5 TABLET ORAL at 00:42

## 2023-09-19 RX ADMIN — Medication 1000 MILLIGRAM(S): at 12:57

## 2023-09-19 RX ADMIN — OXYCODONE HYDROCHLORIDE 5 MILLIGRAM(S): 5 TABLET ORAL at 17:11

## 2023-09-19 RX ADMIN — TIOTROPIUM BROMIDE 2 PUFF(S): 18 CAPSULE ORAL; RESPIRATORY (INHALATION) at 12:10

## 2023-09-19 RX ADMIN — Medication 1000 MILLIGRAM(S): at 04:15

## 2023-09-19 RX ADMIN — Medication 3 MILLILITER(S): at 12:09

## 2023-09-19 RX ADMIN — OXYCODONE HYDROCHLORIDE 5 MILLIGRAM(S): 5 TABLET ORAL at 12:57

## 2023-09-19 RX ADMIN — Medication 1000 MILLIGRAM(S): at 12:09

## 2023-09-19 RX ADMIN — OXYCODONE HYDROCHLORIDE 5 MILLIGRAM(S): 5 TABLET ORAL at 12:09

## 2023-09-19 RX ADMIN — OXYCODONE HYDROCHLORIDE 5 MILLIGRAM(S): 5 TABLET ORAL at 06:10

## 2023-09-19 RX ADMIN — Medication 3 MILLILITER(S): at 06:10

## 2023-09-19 RX ADMIN — Medication 30 MILLIGRAM(S): at 12:50

## 2023-09-19 RX ADMIN — OXYCODONE HYDROCHLORIDE 5 MILLIGRAM(S): 5 TABLET ORAL at 06:48

## 2023-09-19 RX ADMIN — Medication 3 MILLILITER(S): at 17:07

## 2023-09-19 RX ADMIN — BUDESONIDE AND FORMOTEROL FUMARATE DIHYDRATE 2 PUFF(S): 160; 4.5 AEROSOL RESPIRATORY (INHALATION) at 17:33

## 2023-09-19 RX ADMIN — BUDESONIDE AND FORMOTEROL FUMARATE DIHYDRATE 2 PUFF(S): 160; 4.5 AEROSOL RESPIRATORY (INHALATION) at 06:10

## 2023-09-19 RX ADMIN — Medication 0.5 MILLIGRAM(S): at 06:10

## 2023-09-19 RX ADMIN — Medication 3 MILLILITER(S): at 02:27

## 2023-09-19 RX ADMIN — Medication 1000 MILLIGRAM(S): at 17:07

## 2023-09-19 RX ADMIN — Medication 1000 MILLIGRAM(S): at 05:15

## 2023-09-19 RX ADMIN — Medication 0.5 MILLIGRAM(S): at 17:07

## 2023-09-19 NOTE — DISCHARGE NOTE NURSING/CASE MANAGEMENT/SOCIAL WORK - PATIENT PORTAL LINK FT
You can access the FollowMyHealth Patient Portal offered by [facility] by registering at the following website: [email or web address]. By joining [facility]’s FollowMyHealth portal, you will also be able to view your health information using other applications (apps) compatible with our system.

## 2023-09-19 NOTE — PROGRESS NOTE ADULT - SUBJECTIVE AND OBJECTIVE BOX
ACS/TRAUMA SURGERY DAILY PROGRESS NOTE    SUBJECTIVE:  Patient seen and examined at bedside during morning rounds. No overnight events. Patient feeling well without complaints.     Vital Signs Last 24 Hrs  T(C): 36.9 (19 Sep 2023 05:51), Max: 37.2 (18 Sep 2023 15:40)  T(F): 98.4 (19 Sep 2023 05:51), Max: 99 (18 Sep 2023 15:40)  HR: 74 (19 Sep 2023 05:51) (67 - 90)  BP: 117/58 (19 Sep 2023 05:51) (112/63 - 181/96)  BP(mean): 106 (18 Sep 2023 13:30) (88 - 111)  RR: 18 (19 Sep 2023 05:51) (14 - 18)  SpO2: 93% (19 Sep 2023 05:51) (91% - 100%)    Parameters below as of 19 Sep 2023 05:51  Patient On (Oxygen Delivery Method): nasal cannula  O2 Flow (L/min): 2    I&Os    09-18-23 @ 07:01  -  09-19-23 @ 07:00  --------------------------------------------------------  IN: 670 mL / OUT: 1600 mL / NET: -930 mL    PHYSICAL EXAM:  GENERAL: NAD, resting comfortably in bed  HEAD: Normocephalic, atraumatic   LUNG: Nonlabored breathing. On supp O2  ABD: Incisions clean, dry and intact with steri-strips. Soft, nondistended. +Incisional tenderness.   EXT: Warm, well perfused.  NEURO: Responds appropriately, A&Ox3    MEDICATIONS:  acetaminophen     Tablet .. 1000 milliGRAM(s) Oral every 6 hours  albuterol/ipratropium for Nebulization 3 milliLiter(s) Nebulizer every 4 hours  budesonide 160 MICROgram(s)/formoterol 4.5 MICROgram(s) Inhaler 2 Puff(s) Inhalation two times a day  clonazePAM  Tablet 0.5 milliGRAM(s) Oral every 12 hours  dextrose 5%. 1000 milliLiter(s) IV Continuous <Continuous>  dextrose 5%. 1000 milliLiter(s) IV Continuous <Continuous>  dextrose 50% Injectable 12.5 Gram(s) IV Push once  dextrose 50% Injectable 25 Gram(s) IV Push once  dextrose 50% Injectable 25 Gram(s) IV Push once  dextrose Oral Gel 15 Gram(s) Oral once PRN  dextrose Oral Gel 15 Gram(s) Oral once PRN  enoxaparin Injectable 40 milliGRAM(s) SubCutaneous every 24 hours  glucagon  Injectable 1 milliGRAM(s) IntraMuscular once  glucagon  Injectable 1 milliGRAM(s) IntraMuscular once  insulin lispro (ADMELOG) corrective regimen sliding scale   SubCutaneous at bedtime  insulin lispro (ADMELOG) corrective regimen sliding scale   SubCutaneous three times a day before meals  mirtazapine 45 milliGRAM(s) Oral at bedtime  oxyCODONE    IR 2.5 milliGRAM(s) Oral every 6 hours PRN  oxyCODONE    IR 5 milliGRAM(s) Oral every 6 hours PRN  PARoxetine 30 milliGRAM(s) Oral daily  QUEtiapine 100 milliGRAM(s) Oral at bedtime  simvastatin 10 milliGRAM(s) Oral at bedtime  tiotropium 2.5 MICROgram(s) Inhaler 2 Puff(s) Inhalation daily      LABS:                        11.9   6.61  )-----------( 258      ( 19 Sep 2023 06:32 )             36.2     09-19    141  |  105  |  12  ----------------------------<  138<H>  3.9   |  24  |  0.55    Ca    9.8      19 Sep 2023 06:32  Phos  3.6     09-18  Mg     2.0     09-18    TPro  6.5  /  Alb  4.0  /  TBili  0.3  /  DBili  x   /  AST  32  /  ALT  28  /  AlkPhos  37<L>  09-19    PT/INR - ( 18 Sep 2023 07:43 )   PT: 10.3 sec;   INR: 0.98 ratio      PTT - ( 18 Sep 2023 07:43 )  PTT:29.2 sec    Urinalysis Basic - ( 19 Sep 2023 06:32 )  Color: x / Appearance: x / SG: x / pH: x  Gluc: 138 mg/dL / Ketone: x  / Bili: x / Urobili: x   Blood: x / Protein: x / Nitrite: x   Leuk Esterase: x / RBC: x / WBC x   Sq Epi: x / Non Sq Epi: x / Bacteria: x

## 2023-09-19 NOTE — PROGRESS NOTE ADULT - NS ATTEND AMEND GEN_ALL_CORE FT
seen and examined 09-19-23 @ 0918    tolerating regular diet w/o nausea or vomiting    soft / NT / ND  no jaundice      8/22 - 8/24/2023 - admitted ot [facility] for symptomatic cholelithiasis and new-onset COPD  9/18/2023 - laparoscopic cholecystectomy for cholelithiasis with chronic cholecystitis and intractable biliary colic  -discharge home

## 2023-09-19 NOTE — PROGRESS NOTE ADULT - ASSESSMENT
68y Female s/p jadiel levin (7/18/23)    Plan:  Regular diet   Pain control PRN  Incentive spirometer/OOB/Ambulate  Supp O2 goal to 88-92% given COPD history   DVT PPX with Lovenox  Dispo: home    ACS/Trauma  [id number]

## 2023-09-19 NOTE — PROGRESS NOTE ADULT - REASON FOR ADMISSION
Biliary cholic

## 2023-09-20 ENCOUNTER — TRANSCRIPTION ENCOUNTER (OUTPATIENT)
Age: 68
End: 2023-09-20

## 2023-09-25 ENCOUNTER — APPOINTMENT (OUTPATIENT)
Dept: PULMONOLOGY | Facility: CLINIC | Age: 68
End: 2023-09-25

## 2023-09-28 PROBLEM — E11.9 TYPE 2 DIABETES MELLITUS WITHOUT COMPLICATIONS: Chronic | Status: ACTIVE | Noted: 2023-09-14

## 2023-09-28 PROBLEM — J44.9 CHRONIC OBSTRUCTIVE PULMONARY DISEASE, UNSPECIFIED: Chronic | Status: ACTIVE | Noted: 2023-09-13

## 2023-10-02 ENCOUNTER — APPOINTMENT (OUTPATIENT)
Dept: TRAUMA SURGERY | Facility: CLINIC | Age: 68
End: 2023-10-02
Payer: MEDICARE

## 2023-10-02 DIAGNOSIS — K80.10 CALCULUS OF GALLBLADDER WITH CHRONIC CHOLECYSTITIS W/OUT OBSTRUCTION: ICD-10-CM

## 2023-10-02 PROCEDURE — 99024 POSTOP FOLLOW-UP VISIT: CPT

## 2023-10-05 ENCOUNTER — APPOINTMENT (OUTPATIENT)
Dept: PULMONOLOGY | Facility: CLINIC | Age: 68
End: 2023-10-05
Payer: MEDICARE

## 2023-10-05 VITALS
BODY MASS INDEX: 24.84 KG/M2 | HEART RATE: 79 BPM | WEIGHT: 135 LBS | TEMPERATURE: 97.3 F | OXYGEN SATURATION: 92 % | RESPIRATION RATE: 18 BRPM | DIASTOLIC BLOOD PRESSURE: 70 MMHG | HEIGHT: 62 IN | SYSTOLIC BLOOD PRESSURE: 120 MMHG

## 2023-10-05 DIAGNOSIS — J96.11 CHRONIC RESPIRATORY FAILURE WITH HYPOXIA: ICD-10-CM

## 2023-10-05 DIAGNOSIS — Z81.8 FAMILY HISTORY OF OTHER MENTAL AND BEHAVIORAL DISORDERS: ICD-10-CM

## 2023-10-05 DIAGNOSIS — J96.12 CHRONIC RESPIRATORY FAILURE WITH HYPOXIA: ICD-10-CM

## 2023-10-05 DIAGNOSIS — Z12.2 ENCOUNTER FOR SCREENING FOR MALIGNANT NEOPLASM OF RESPIRATORY ORGANS: ICD-10-CM

## 2023-10-05 DIAGNOSIS — Z87.891 PERSONAL HISTORY OF NICOTINE DEPENDENCE: ICD-10-CM

## 2023-10-05 DIAGNOSIS — Z83.0 FAMILY HISTORY OF HUMAN IMMUNODEFICIENCY VIRUS [HIV] DISEASE: ICD-10-CM

## 2023-10-05 DIAGNOSIS — Z86.69 PERSONAL HISTORY OF OTHER DISEASES OF THE NERVOUS SYSTEM AND SENSE ORGANS: ICD-10-CM

## 2023-10-05 PROCEDURE — 95012 NITRIC OXIDE EXP GAS DETER: CPT

## 2023-10-05 PROCEDURE — 94618 PULMONARY STRESS TESTING: CPT

## 2023-10-05 PROCEDURE — 99214 OFFICE O/P EST MOD 30 MIN: CPT | Mod: 25

## 2023-10-05 PROCEDURE — 94729 DIFFUSING CAPACITY: CPT

## 2023-10-05 PROCEDURE — 94727 GAS DIL/WSHOT DETER LNG VOL: CPT

## 2023-10-05 PROCEDURE — 71046 X-RAY EXAM CHEST 2 VIEWS: CPT

## 2023-10-05 PROCEDURE — 94010 BREATHING CAPACITY TEST: CPT

## 2023-10-05 RX ORDER — OLOPATADINE HYDROCHLORIDE 665 UG/1
0.6 SPRAY, METERED NASAL
Qty: 3 | Refills: 1 | Status: ACTIVE | COMMUNITY
Start: 2023-10-05 | End: 1900-01-01

## 2023-10-05 RX ORDER — FLUTICASONE FUROATE, UMECLIDINIUM BROMIDE AND VILANTEROL TRIFENATATE 200; 62.5; 25 UG/1; UG/1; UG/1
200-62.5-25 POWDER RESPIRATORY (INHALATION)
Qty: 3 | Refills: 1 | Status: ACTIVE | COMMUNITY
Start: 2023-10-05 | End: 1900-01-01

## 2023-10-06 LAB — SURGICAL PATHOLOGY STUDY: SIGNIFICANT CHANGE UP

## 2023-10-07 ENCOUNTER — LABORATORY RESULT (OUTPATIENT)
Age: 68
End: 2023-10-07

## 2023-10-08 LAB
24R-OH-CALCIDIOL SERPL-MCNC: 58.3 PG/ML
25(OH)D3 SERPL-MCNC: 26.3 NG/ML
A1AT SERPL-MCNC: 144 MG/DL
BASOPHILS # BLD AUTO: 0.06 K/UL
BASOPHILS NFR BLD AUTO: 1.1 %
EOSINOPHIL # BLD AUTO: 0.19 K/UL
EOSINOPHIL NFR BLD AUTO: 3.6 %
HCT VFR BLD CALC: 42.6 %
HGB BLD-MCNC: 13.7 G/DL
IMM GRANULOCYTES NFR BLD AUTO: 0.4 %
LYMPHOCYTES # BLD AUTO: 1.72 K/UL
LYMPHOCYTES NFR BLD AUTO: 32.3 %
MAN DIFF?: NORMAL
MCHC RBC-ENTMCNC: 30.5 PG
MCHC RBC-ENTMCNC: 32.2 GM/DL
MCV RBC AUTO: 94.9 FL
MONOCYTES # BLD AUTO: 0.62 K/UL
MONOCYTES NFR BLD AUTO: 11.7 %
NEUTROPHILS # BLD AUTO: 2.71 K/UL
NEUTROPHILS NFR BLD AUTO: 50.9 %
PLATELET # BLD AUTO: 388 K/UL
RBC # BLD: 4.49 M/UL
RBC # FLD: 13.6 %
WBC # FLD AUTO: 5.32 K/UL

## 2023-10-09 LAB
A ALTERNATA IGE QN: <0.1 KUA/L
A FUMIGATUS IGE QN: <0.1 KUA/L
BERMUDA GRASS IGE QN: <0.1 KUA/L
BOXELDER IGE QN: <0.1 KUA/L
C HERBARUM IGE QN: <0.1 KUA/L
CALIF WALNUT IGE QN: <0.1 KUA/L
CAT DANDER IGE QN: <0.1 KUA/L
CLAM IGE QN: <0.1 KUA/L
CMN PIGWEED IGE QN: <0.1 KUA/L
CODFISH IGE QN: <0.1 KUA/L
COMMON RAGWEED IGE QN: <0.1 KUA/L
CORN IGE QN: <0.1 KUA/L
COTTONWOOD IGE QN: <0.1 KUA/L
COW MILK IGE QN: <0.1 KUA/L
D FARINAE IGE QN: <0.1 KUA/L
D PTERONYSS IGE QN: <0.1 KUA/L
DEPRECATED A ALTERNATA IGE RAST QL: 0
DEPRECATED A FUMIGATUS IGE RAST QL: 0
DEPRECATED BERMUDA GRASS IGE RAST QL: 0
DEPRECATED BOXELDER IGE RAST QL: 0
DEPRECATED C HERBARUM IGE RAST QL: 0
DEPRECATED CAT DANDER IGE RAST QL: 0
DEPRECATED CLAM IGE RAST QL: 0
DEPRECATED CODFISH IGE RAST QL: 0
DEPRECATED COMMON PIGWEED IGE RAST QL: 0
DEPRECATED COMMON RAGWEED IGE RAST QL: 0
DEPRECATED CORN IGE RAST QL: 0
DEPRECATED COTTONWOOD IGE RAST QL: 0
DEPRECATED COW MILK IGE RAST QL: 0
DEPRECATED D FARINAE IGE RAST QL: 0
DEPRECATED D PTERONYSS IGE RAST QL: 0
DEPRECATED DOG DANDER IGE RAST QL: 0
DEPRECATED DUCK FEATHER IGE RAST QL: 0
DEPRECATED EGG WHITE IGE RAST QL: 0
DEPRECATED GOOSE FEATHER IGE RAST QL: 0
DEPRECATED GOOSEFOOT IGE RAST QL: 0
DEPRECATED LONDON PLANE IGE RAST QL: 0
DEPRECATED MOUSE URINE PROT IGE RAST QL: 0
DEPRECATED MUGWORT IGE RAST QL: 0
DEPRECATED P NOTATUM IGE RAST QL: 0
DEPRECATED PEANUT IGE RAST QL: 0
DEPRECATED RED CEDAR IGE RAST QL: 0
DEPRECATED ROACH IGE RAST QL: 0
DEPRECATED SCALLOP IGE RAST QL: <0.1 KUA/L
DEPRECATED SESAME SEED IGE RAST QL: 0
DEPRECATED SHEEP SORREL IGE RAST QL: 0
DEPRECATED SHRIMP IGE RAST QL: 0
DEPRECATED SILVER BIRCH IGE RAST QL: 0
DEPRECATED SOYBEAN IGE RAST QL: 0
DEPRECATED TIMOTHY IGE RAST QL: 0
DEPRECATED WALNUT IGE RAST QL: 0
DEPRECATED WHEAT IGE RAST QL: 0
DEPRECATED WHITE ASH IGE RAST QL: 0
DEPRECATED WHITE OAK IGE RAST QL: 0
DOG DANDER IGE QN: <0.1 KUA/L
DUCK FEATHER IGE QN: <0.1 KUA/L
EGG WHITE IGE QN: <0.1 KUA/L
GOOSE FEATHER IGE QN: <0.1 KUA/L
GOOSEFOOT IGE QN: <0.1 KUA/L
LONDON PLANE IGE QN: <0.1 KUA/L
MOUSE URINE PROT IGE QN: <0.1 KUA/L
MUGWORT IGE QN: <0.1 KUA/L
MULBERRY (T70) CLASS: 0
MULBERRY (T70) CONC: <0.1 KUA/L
P NOTATUM IGE QN: <0.1 KUA/L
PEANUT IGE QN: <0.1 KUA/L
RED CEDAR IGE QN: <0.1 KUA/L
ROACH IGE QN: <0.1 KUA/L
SCALLOP IGE QN: 0
SCALLOP IGE QN: <0.1 KUA/L
SESAME SEED IGE QN: <0.1 KUA/L
SHEEP SORREL IGE QN: <0.1 KUA/L
SILVER BIRCH IGE QN: <0.1 KUA/L
SOYBEAN IGE QN: <0.1 KUA/L
TIMOTHY IGE QN: <0.1 KUA/L
TOTAL IGE SMQN RAST: 10 KU/L
TREE ALLERG MIX1 IGE QL: 0
WALNUT IGE QN: <0.1 KUA/L
WHEAT IGE QN: <0.1 KUA/L
WHITE ASH IGE QN: <0.1 KUA/L
WHITE ELM IGE QN: 0
WHITE ELM IGE QN: <0.1 KUA/L
WHITE OAK IGE QN: <0.1 KUA/L

## 2023-10-10 LAB
A ALTERNATA IGE QN: <0.1 KUA/L
A FUMIGATUS IGE QN: <0.1 KUA/L
C ALBICANS IGE QN: <0.1 KUA/L
C HERBARUM IGE QN: <0.1 KUA/L
CAT DANDER IGE QN: <0.1 KUA/L
COMMON RAGWEED IGE QN: <0.1 KUA/L
D FARINAE IGE QN: <0.1 KUA/L
D PTERONYSS IGE QN: <0.1 KUA/L
DEPRECATED A ALTERNATA IGE RAST QL: 0
DEPRECATED A FUMIGATUS IGE RAST QL: 0
DEPRECATED C ALBICANS IGE RAST QL: 0
DEPRECATED C HERBARUM IGE RAST QL: 0
DEPRECATED CAT DANDER IGE RAST QL: 0
DEPRECATED COMMON RAGWEED IGE RAST QL: 0
DEPRECATED D FARINAE IGE RAST QL: 0
DEPRECATED D PTERONYSS IGE RAST QL: 0
DEPRECATED DOG DANDER IGE RAST QL: 0
DEPRECATED M RACEMOSUS IGE RAST QL: 0
DEPRECATED ROACH IGE RAST QL: 0
DEPRECATED TIMOTHY IGE RAST QL: 0
DEPRECATED WHITE OAK IGE RAST QL: 0
DOG DANDER IGE QN: <0.1 KUA/L
M RACEMOSUS IGE QN: <0.1 KUA/L
ROACH IGE QN: <0.1 KUA/L
TIMOTHY IGE QN: <0.1 KUA/L
WHITE OAK IGE QN: <0.1 KUA/L

## 2023-10-11 LAB
A1AT PHENOTYP SERPL-IMP: NORMAL
A1AT SERPL-MCNC: 147 MG/DL

## 2023-10-17 LAB
ANNOTATION COMMENT IMP: NORMAL
ELECTRONIC SIGNATURE: NORMAL
SERPINA1 GENE MUT TESTED BLD/T: NORMAL

## 2023-10-29 ENCOUNTER — INPATIENT (INPATIENT)
Facility: HOSPITAL | Age: 68
LOS: 4 days | Discharge: NURSING, REHAB OR LONG-TERM CARE | DRG: 563 | End: 2023-11-03
Attending: INTERNAL MEDICINE | Admitting: INTERNAL MEDICINE
Payer: COMMERCIAL

## 2023-10-29 VITALS
HEIGHT: 62 IN | TEMPERATURE: 98 F | HEART RATE: 76 BPM | WEIGHT: 175.05 LBS | OXYGEN SATURATION: 93 % | SYSTOLIC BLOOD PRESSURE: 124 MMHG | DIASTOLIC BLOOD PRESSURE: 78 MMHG | RESPIRATION RATE: 20 BRPM

## 2023-10-29 DIAGNOSIS — S93.325A DISLOCATION OF TARSOMETATARSAL JOINT OF LEFT FOOT, INITIAL ENCOUNTER: ICD-10-CM

## 2023-10-29 DIAGNOSIS — Z96.641 PRESENCE OF RIGHT ARTIFICIAL HIP JOINT: Chronic | ICD-10-CM

## 2023-10-29 LAB
ALBUMIN SERPL ELPH-MCNC: 4.4 G/DL — SIGNIFICANT CHANGE UP (ref 3.3–5)
ALBUMIN SERPL ELPH-MCNC: 4.4 G/DL — SIGNIFICANT CHANGE UP (ref 3.3–5)
ALP SERPL-CCNC: 38 U/L — LOW (ref 40–120)
ALP SERPL-CCNC: 38 U/L — LOW (ref 40–120)
ALT FLD-CCNC: 10 U/L — SIGNIFICANT CHANGE UP (ref 10–45)
ALT FLD-CCNC: 10 U/L — SIGNIFICANT CHANGE UP (ref 10–45)
ANION GAP SERPL CALC-SCNC: 10 MMOL/L — SIGNIFICANT CHANGE UP (ref 5–17)
ANION GAP SERPL CALC-SCNC: 10 MMOL/L — SIGNIFICANT CHANGE UP (ref 5–17)
AST SERPL-CCNC: 15 U/L — SIGNIFICANT CHANGE UP (ref 10–40)
AST SERPL-CCNC: 15 U/L — SIGNIFICANT CHANGE UP (ref 10–40)
BASOPHILS # BLD AUTO: 0.06 K/UL — SIGNIFICANT CHANGE UP (ref 0–0.2)
BASOPHILS # BLD AUTO: 0.06 K/UL — SIGNIFICANT CHANGE UP (ref 0–0.2)
BASOPHILS NFR BLD AUTO: 0.7 % — SIGNIFICANT CHANGE UP (ref 0–2)
BASOPHILS NFR BLD AUTO: 0.7 % — SIGNIFICANT CHANGE UP (ref 0–2)
BILIRUB SERPL-MCNC: 0.2 MG/DL — SIGNIFICANT CHANGE UP (ref 0.2–1.2)
BILIRUB SERPL-MCNC: 0.2 MG/DL — SIGNIFICANT CHANGE UP (ref 0.2–1.2)
BUN SERPL-MCNC: 23 MG/DL — SIGNIFICANT CHANGE UP (ref 7–23)
BUN SERPL-MCNC: 23 MG/DL — SIGNIFICANT CHANGE UP (ref 7–23)
CALCIUM SERPL-MCNC: 9.8 MG/DL — SIGNIFICANT CHANGE UP (ref 8.4–10.5)
CALCIUM SERPL-MCNC: 9.8 MG/DL — SIGNIFICANT CHANGE UP (ref 8.4–10.5)
CHLORIDE SERPL-SCNC: 102 MMOL/L — SIGNIFICANT CHANGE UP (ref 96–108)
CHLORIDE SERPL-SCNC: 102 MMOL/L — SIGNIFICANT CHANGE UP (ref 96–108)
CO2 SERPL-SCNC: 26 MMOL/L — SIGNIFICANT CHANGE UP (ref 22–31)
CO2 SERPL-SCNC: 26 MMOL/L — SIGNIFICANT CHANGE UP (ref 22–31)
CREAT SERPL-MCNC: 0.63 MG/DL — SIGNIFICANT CHANGE UP (ref 0.5–1.3)
CREAT SERPL-MCNC: 0.63 MG/DL — SIGNIFICANT CHANGE UP (ref 0.5–1.3)
EGFR: 97 ML/MIN/1.73M2 — SIGNIFICANT CHANGE UP
EGFR: 97 ML/MIN/1.73M2 — SIGNIFICANT CHANGE UP
EOSINOPHIL # BLD AUTO: 0.19 K/UL — SIGNIFICANT CHANGE UP (ref 0–0.5)
EOSINOPHIL # BLD AUTO: 0.19 K/UL — SIGNIFICANT CHANGE UP (ref 0–0.5)
EOSINOPHIL NFR BLD AUTO: 2.4 % — SIGNIFICANT CHANGE UP (ref 0–6)
EOSINOPHIL NFR BLD AUTO: 2.4 % — SIGNIFICANT CHANGE UP (ref 0–6)
GLUCOSE BLDC GLUCOMTR-MCNC: 111 MG/DL — HIGH (ref 70–99)
GLUCOSE BLDC GLUCOMTR-MCNC: 111 MG/DL — HIGH (ref 70–99)
GLUCOSE SERPL-MCNC: 110 MG/DL — HIGH (ref 70–99)
GLUCOSE SERPL-MCNC: 110 MG/DL — HIGH (ref 70–99)
HCT VFR BLD CALC: 41.8 % — SIGNIFICANT CHANGE UP (ref 34.5–45)
HCT VFR BLD CALC: 41.8 % — SIGNIFICANT CHANGE UP (ref 34.5–45)
HGB BLD-MCNC: 13.5 G/DL — SIGNIFICANT CHANGE UP (ref 11.5–15.5)
HGB BLD-MCNC: 13.5 G/DL — SIGNIFICANT CHANGE UP (ref 11.5–15.5)
IMM GRANULOCYTES NFR BLD AUTO: 0.4 % — SIGNIFICANT CHANGE UP (ref 0–0.9)
IMM GRANULOCYTES NFR BLD AUTO: 0.4 % — SIGNIFICANT CHANGE UP (ref 0–0.9)
LYMPHOCYTES # BLD AUTO: 1.4 K/UL — SIGNIFICANT CHANGE UP (ref 1–3.3)
LYMPHOCYTES # BLD AUTO: 1.4 K/UL — SIGNIFICANT CHANGE UP (ref 1–3.3)
LYMPHOCYTES # BLD AUTO: 17.4 % — SIGNIFICANT CHANGE UP (ref 13–44)
LYMPHOCYTES # BLD AUTO: 17.4 % — SIGNIFICANT CHANGE UP (ref 13–44)
MCHC RBC-ENTMCNC: 29.7 PG — SIGNIFICANT CHANGE UP (ref 27–34)
MCHC RBC-ENTMCNC: 29.7 PG — SIGNIFICANT CHANGE UP (ref 27–34)
MCHC RBC-ENTMCNC: 32.3 GM/DL — SIGNIFICANT CHANGE UP (ref 32–36)
MCHC RBC-ENTMCNC: 32.3 GM/DL — SIGNIFICANT CHANGE UP (ref 32–36)
MCV RBC AUTO: 92.1 FL — SIGNIFICANT CHANGE UP (ref 80–100)
MCV RBC AUTO: 92.1 FL — SIGNIFICANT CHANGE UP (ref 80–100)
MONOCYTES # BLD AUTO: 0.54 K/UL — SIGNIFICANT CHANGE UP (ref 0–0.9)
MONOCYTES # BLD AUTO: 0.54 K/UL — SIGNIFICANT CHANGE UP (ref 0–0.9)
MONOCYTES NFR BLD AUTO: 6.7 % — SIGNIFICANT CHANGE UP (ref 2–14)
MONOCYTES NFR BLD AUTO: 6.7 % — SIGNIFICANT CHANGE UP (ref 2–14)
NEUTROPHILS # BLD AUTO: 5.82 K/UL — SIGNIFICANT CHANGE UP (ref 1.8–7.4)
NEUTROPHILS # BLD AUTO: 5.82 K/UL — SIGNIFICANT CHANGE UP (ref 1.8–7.4)
NEUTROPHILS NFR BLD AUTO: 72.4 % — SIGNIFICANT CHANGE UP (ref 43–77)
NEUTROPHILS NFR BLD AUTO: 72.4 % — SIGNIFICANT CHANGE UP (ref 43–77)
NRBC # BLD: 0 /100 WBCS — SIGNIFICANT CHANGE UP (ref 0–0)
NRBC # BLD: 0 /100 WBCS — SIGNIFICANT CHANGE UP (ref 0–0)
PLATELET # BLD AUTO: 431 K/UL — HIGH (ref 150–400)
PLATELET # BLD AUTO: 431 K/UL — HIGH (ref 150–400)
POTASSIUM SERPL-MCNC: 4.3 MMOL/L — SIGNIFICANT CHANGE UP (ref 3.5–5.3)
POTASSIUM SERPL-MCNC: 4.3 MMOL/L — SIGNIFICANT CHANGE UP (ref 3.5–5.3)
POTASSIUM SERPL-SCNC: 4.3 MMOL/L — SIGNIFICANT CHANGE UP (ref 3.5–5.3)
POTASSIUM SERPL-SCNC: 4.3 MMOL/L — SIGNIFICANT CHANGE UP (ref 3.5–5.3)
PROT SERPL-MCNC: 7.2 G/DL — SIGNIFICANT CHANGE UP (ref 6–8.3)
PROT SERPL-MCNC: 7.2 G/DL — SIGNIFICANT CHANGE UP (ref 6–8.3)
RBC # BLD: 4.54 M/UL — SIGNIFICANT CHANGE UP (ref 3.8–5.2)
RBC # BLD: 4.54 M/UL — SIGNIFICANT CHANGE UP (ref 3.8–5.2)
RBC # FLD: 13.1 % — SIGNIFICANT CHANGE UP (ref 10.3–14.5)
RBC # FLD: 13.1 % — SIGNIFICANT CHANGE UP (ref 10.3–14.5)
SODIUM SERPL-SCNC: 138 MMOL/L — SIGNIFICANT CHANGE UP (ref 135–145)
SODIUM SERPL-SCNC: 138 MMOL/L — SIGNIFICANT CHANGE UP (ref 135–145)
WBC # BLD: 8.04 K/UL — SIGNIFICANT CHANGE UP (ref 3.8–10.5)
WBC # BLD: 8.04 K/UL — SIGNIFICANT CHANGE UP (ref 3.8–10.5)
WBC # FLD AUTO: 8.04 K/UL — SIGNIFICANT CHANGE UP (ref 3.8–10.5)
WBC # FLD AUTO: 8.04 K/UL — SIGNIFICANT CHANGE UP (ref 3.8–10.5)

## 2023-10-29 PROCEDURE — 73630 X-RAY EXAM OF FOOT: CPT | Mod: 26,LT,RT

## 2023-10-29 PROCEDURE — 76377 3D RENDER W/INTRP POSTPROCES: CPT | Mod: 26

## 2023-10-29 PROCEDURE — 99285 EMERGENCY DEPT VISIT HI MDM: CPT

## 2023-10-29 PROCEDURE — 73610 X-RAY EXAM OF ANKLE: CPT | Mod: 26,LT,RT

## 2023-10-29 PROCEDURE — 73700 CT LOWER EXTREMITY W/O DYE: CPT | Mod: 26,LT,MA

## 2023-10-29 RX ORDER — FESOTERODINE FUMARATE 8 MG/1
1 TABLET, FILM COATED, EXTENDED RELEASE ORAL
Refills: 0 | DISCHARGE

## 2023-10-29 RX ORDER — FENOFIBRATE,MICRONIZED 130 MG
145 CAPSULE ORAL DAILY
Refills: 0 | Status: DISCONTINUED | OUTPATIENT
Start: 2023-10-29 | End: 2023-11-03

## 2023-10-29 RX ORDER — METFORMIN HYDROCHLORIDE 850 MG/1
1 TABLET ORAL
Refills: 0 | DISCHARGE

## 2023-10-29 RX ORDER — FLUTICASONE FUROATE, UMECLIDINIUM BROMIDE AND VILANTEROL TRIFENATATE 200; 62.5; 25 UG/1; UG/1; UG/1
1 POWDER RESPIRATORY (INHALATION)
Refills: 0 | DISCHARGE

## 2023-10-29 RX ORDER — OMEPRAZOLE 10 MG/1
1 CAPSULE, DELAYED RELEASE ORAL
Refills: 0 | DISCHARGE

## 2023-10-29 RX ORDER — SODIUM CHLORIDE 9 MG/ML
1000 INJECTION, SOLUTION INTRAVENOUS
Refills: 0 | Status: DISCONTINUED | OUTPATIENT
Start: 2023-10-29 | End: 2023-11-03

## 2023-10-29 RX ORDER — OXYBUTYNIN CHLORIDE 5 MG
10 TABLET ORAL
Refills: 0 | Status: DISCONTINUED | OUTPATIENT
Start: 2023-10-29 | End: 2023-11-03

## 2023-10-29 RX ORDER — CLONAZEPAM 1 MG
1 TABLET ORAL
Refills: 0 | DISCHARGE

## 2023-10-29 RX ORDER — QUETIAPINE FUMARATE 200 MG/1
1 TABLET, FILM COATED ORAL
Refills: 0 | DISCHARGE

## 2023-10-29 RX ORDER — THEOPHYLLINE ANHYDROUS 200 MG
1 TABLET, EXTENDED RELEASE 12 HR ORAL
Refills: 0 | DISCHARGE

## 2023-10-29 RX ORDER — MIRTAZAPINE 45 MG/1
45 TABLET, ORALLY DISINTEGRATING ORAL AT BEDTIME
Refills: 0 | Status: DISCONTINUED | OUTPATIENT
Start: 2023-10-29 | End: 2023-11-03

## 2023-10-29 RX ORDER — MIRABEGRON 50 MG/1
1 TABLET, EXTENDED RELEASE ORAL
Refills: 0 | DISCHARGE

## 2023-10-29 RX ORDER — MIRTAZAPINE 45 MG/1
1 TABLET, ORALLY DISINTEGRATING ORAL
Refills: 0 | DISCHARGE

## 2023-10-29 RX ORDER — GLUCAGON INJECTION, SOLUTION 0.5 MG/.1ML
1 INJECTION, SOLUTION SUBCUTANEOUS ONCE
Refills: 0 | Status: DISCONTINUED | OUTPATIENT
Start: 2023-10-29 | End: 2023-11-03

## 2023-10-29 RX ORDER — ACETAMINOPHEN 500 MG
1000 TABLET ORAL ONCE
Refills: 0 | Status: COMPLETED | OUTPATIENT
Start: 2023-10-29 | End: 2023-10-29

## 2023-10-29 RX ORDER — QUETIAPINE FUMARATE 200 MG/1
100 TABLET, FILM COATED ORAL DAILY
Refills: 0 | Status: DISCONTINUED | OUTPATIENT
Start: 2023-10-29 | End: 2023-11-03

## 2023-10-29 RX ORDER — FENOFIBRATE,MICRONIZED 130 MG
1 CAPSULE ORAL
Refills: 0 | DISCHARGE

## 2023-10-29 RX ORDER — DEXTROSE 50 % IN WATER 50 %
25 SYRINGE (ML) INTRAVENOUS ONCE
Refills: 0 | Status: DISCONTINUED | OUTPATIENT
Start: 2023-10-29 | End: 2023-11-03

## 2023-10-29 RX ORDER — INSULIN LISPRO 100/ML
VIAL (ML) SUBCUTANEOUS AT BEDTIME
Refills: 0 | Status: DISCONTINUED | OUTPATIENT
Start: 2023-10-29 | End: 2023-11-03

## 2023-10-29 RX ORDER — THEOPHYLLINE ANHYDROUS 200 MG
400 TABLET, EXTENDED RELEASE 12 HR ORAL DAILY
Refills: 0 | Status: DISCONTINUED | OUTPATIENT
Start: 2023-10-29 | End: 2023-11-03

## 2023-10-29 RX ORDER — MORPHINE SULFATE 50 MG/1
4 CAPSULE, EXTENDED RELEASE ORAL ONCE
Refills: 0 | Status: DISCONTINUED | OUTPATIENT
Start: 2023-10-29 | End: 2023-10-29

## 2023-10-29 RX ORDER — FLUTICASONE PROPIONATE 220 MCG
1 AEROSOL WITH ADAPTER (GRAM) INHALATION
Refills: 0 | DISCHARGE

## 2023-10-29 RX ORDER — CLONAZEPAM 1 MG
0.5 TABLET ORAL EVERY 12 HOURS
Refills: 0 | Status: DISCONTINUED | OUTPATIENT
Start: 2023-10-29 | End: 2023-11-03

## 2023-10-29 RX ORDER — ACETAMINOPHEN 500 MG
650 TABLET ORAL EVERY 6 HOURS
Refills: 0 | Status: DISCONTINUED | OUTPATIENT
Start: 2023-10-29 | End: 2023-11-03

## 2023-10-29 RX ORDER — PANTOPRAZOLE SODIUM 20 MG/1
40 TABLET, DELAYED RELEASE ORAL
Refills: 0 | Status: DISCONTINUED | OUTPATIENT
Start: 2023-10-29 | End: 2023-11-03

## 2023-10-29 RX ORDER — IPRATROPIUM/ALBUTEROL SULFATE 18-103MCG
3 AEROSOL WITH ADAPTER (GRAM) INHALATION EVERY 6 HOURS
Refills: 0 | Status: DISCONTINUED | OUTPATIENT
Start: 2023-10-29 | End: 2023-11-03

## 2023-10-29 RX ORDER — TIOTROPIUM BROMIDE 18 UG/1
2 CAPSULE ORAL; RESPIRATORY (INHALATION) DAILY
Refills: 0 | Status: DISCONTINUED | OUTPATIENT
Start: 2023-10-29 | End: 2023-11-03

## 2023-10-29 RX ORDER — DEXTROSE 50 % IN WATER 50 %
15 SYRINGE (ML) INTRAVENOUS ONCE
Refills: 0 | Status: DISCONTINUED | OUTPATIENT
Start: 2023-10-29 | End: 2023-11-03

## 2023-10-29 RX ORDER — INSULIN LISPRO 100/ML
VIAL (ML) SUBCUTANEOUS
Refills: 0 | Status: DISCONTINUED | OUTPATIENT
Start: 2023-10-29 | End: 2023-11-03

## 2023-10-29 RX ORDER — DEXTROSE 50 % IN WATER 50 %
12.5 SYRINGE (ML) INTRAVENOUS ONCE
Refills: 0 | Status: DISCONTINUED | OUTPATIENT
Start: 2023-10-29 | End: 2023-11-03

## 2023-10-29 RX ORDER — ALBUTEROL 90 UG/1
2 AEROSOL, METERED ORAL
Refills: 0 | DISCHARGE

## 2023-10-29 RX ORDER — ENOXAPARIN SODIUM 100 MG/ML
40 INJECTION SUBCUTANEOUS EVERY 24 HOURS
Refills: 0 | Status: DISCONTINUED | OUTPATIENT
Start: 2023-10-29 | End: 2023-11-03

## 2023-10-29 RX ADMIN — QUETIAPINE FUMARATE 100 MILLIGRAM(S): 200 TABLET, FILM COATED ORAL at 23:05

## 2023-10-29 RX ADMIN — Medication 1000 MILLIGRAM(S): at 12:16

## 2023-10-29 RX ADMIN — Medication 3 MILLILITER(S): at 23:05

## 2023-10-29 RX ADMIN — Medication 0.5 MILLIGRAM(S): at 23:05

## 2023-10-29 RX ADMIN — Medication 400 MILLIGRAM(S): at 12:11

## 2023-10-29 RX ADMIN — MIRTAZAPINE 45 MILLIGRAM(S): 45 TABLET, ORALLY DISINTEGRATING ORAL at 23:44

## 2023-10-29 RX ADMIN — MORPHINE SULFATE 4 MILLIGRAM(S): 50 CAPSULE, EXTENDED RELEASE ORAL at 13:08

## 2023-10-29 NOTE — ED PROVIDER NOTE - ATTENDING WITH...
RETURN TO THE EMERGENCY DEPARTMENT AS NEEDED, PARTICULARLY FOR GI BLEEDING OR ABDOMINAL PAIN OR OTHERWISE AS NECESSARY.   Resident

## 2023-10-29 NOTE — H&P ADULT - NSHPSOCIALHISTORY_GEN_ALL_CORE
Social History:    Marital Status:  (   )    (   ) Single    (   )    ( x )   Occupation:   Lives with: (  ) alone  ( x ) children   (  ) spouse   (  ) parents  (  ) other    Substance Use (street drugs): (  x) never used  (  ) other:  Tobacco Usage:  (   ) never smoked   ( x  ) former smoker   (   ) current smoker  (     ) pack years  (        ) last cigarette date  Alcohol Usage: no    (     ) Advanced Directives: (     ) None    (      ) DNR    (     ) DNI    (     ) Health Care Proxy:

## 2023-10-29 NOTE — H&P ADULT - ASSESSMENT
68 f with    Feet fractures  - pain control  - Podiatry evaluation  - PT    EKG changes  - Echo  - cardiology evaluation dr Begum    COPD  - nebs  - supplement O2    Diabetes Mellitus  - BS control  - ADA diet     HTN  - control    Anxiety  - control    DVT prophylaxis

## 2023-10-29 NOTE — ED PROVIDER NOTE - NSICDXPASTMEDICALHX_GEN_ALL_CORE_FT
PAST MEDICAL HISTORY:  COPD, mild     DM (diabetes mellitus)     Hyperlipidemia     Hypertension     Overactive bladder     Type 2 diabetes mellitus     UTI (urinary tract infection)

## 2023-10-29 NOTE — ED PROVIDER NOTE - NSICDXPASTSURGICALHX_GEN_ALL_CORE_FT
PAST SURGICAL HISTORY:  No significant past surgical history     No significant past surgical history     S/P hip replacement, right

## 2023-10-29 NOTE — PATIENT PROFILE ADULT - FALL HARM RISK - RISK INTERVENTIONS

## 2023-10-29 NOTE — PATIENT PROFILE ADULT - FUNCTIONAL ASSESSMENT - BASIC MOBILITY 6.
4-calculated by average/Not able to assess (calculate score using [facility] averaging method)

## 2023-10-29 NOTE — ED PROVIDER NOTE - ATTENDING CONTRIBUTION TO CARE
69 y/o F with PMHx of COPD (has rx for home O2, does not use all the time), DM who presents Status post recent lap ollie has been doing well no new abdominal pains no nausea vomiting.  Patient had a mechanical fall going downstairs tripped on the second to last step inverted both of her feet fell did not hit her head.  Patient has no other complaints besides bilateral lateral foot pain.  Patient noted to have ecchymosis to the anterior portion of both of her feet full range of motion of her ankles knees and hips but significant chronic ecchymosis bony tenderness x-rays ordered rule out fractures analgesia given.

## 2023-10-29 NOTE — CONSULT NOTE ADULT - SUBJECTIVE AND OBJECTIVE BOX
Patient is a 68y old  Female who presents with a chief complaint of     HPI:  69 y/o F with PMHx of COPD (on home O2, 2L 24/7), HTN, HLD and PSHx of cholecystectomy 1 month ago, presents to ED for evaluation of bilateral foot and ankle pain following mechanical fall this morning. States she was walking down steps in her home, when she slipped on the 2nd step from the bottom, inverting both ankles. Has been unable to bear weight since. Recalls all details from the fall, did not hit her head and is not on anticoagulation. Denies recent illness, chest pain/dizziness prior to the fall or currently, pain in the knees/hips/back/neck or upper extremities. NKDA. Given Toradol by EMS PTA.    PAST MEDICAL & SURGICAL HISTORY:  UTI (urinary tract infection)      Hypertension      Hyperlipidemia      Type 2 diabetes mellitus      Overactive bladder      COPD, mild      DM (diabetes mellitus)      No significant past surgical history      S/P hip replacement, right      No significant past surgical history          MEDICATIONS  (STANDING):    MEDICATIONS  (PRN):      Allergies    No Known Allergies    Intolerances        VITALS:    Vital Signs Last 24 Hrs  T(C): 36.6 (29 Oct 2023 11:35), Max: 36.6 (29 Oct 2023 11:35)  T(F): 97.9 (29 Oct 2023 11:35), Max: 97.9 (29 Oct 2023 11:35)  HR: 68 (29 Oct 2023 12:10) (68 - 76)  BP: 116/80 (29 Oct 2023 12:10) (116/80 - 124/78)  BP(mean): --  RR: 18 (29 Oct 2023 12:10) (18 - 20)  SpO2: 90% (29 Oct 2023 12:10) (90% - 93%)    Parameters below as of 29 Oct 2023 12:10  Patient On (Oxygen Delivery Method): nasal cannula  O2 Flow (L/min): 2      LABS:                          13.5   8.04  )-----------( 431      ( 29 Oct 2023 12:57 )             41.8       10-29    138  |  102  |  23  ----------------------------<  110<H>  4.3   |  26  |  0.63    Ca    9.8      29 Oct 2023 12:57    TPro  7.2  /  Alb  4.4  /  TBili  0.2  /  DBili  x   /  AST  15  /  ALT  10  /  AlkPhos  38<L>  10-29      CAPILLARY BLOOD GLUCOSE              LOWER EXTREMITY PHYSICAL EXAM:    Vascular: DP/PT 2/4, B/L, CFT <3 seconds B/L, Temperature gradient warm to cool, B/L.   Neuro: Epicritic sensation intact to the level of digits, B/L.  Musculoskeletal/Ortho: unremarkable  Skin: Right foot posterior lateral ecchymosis, moderate global edema, no open wounds or lesions, no acute signs of infection. Left foot mild global edema, no open wounds or lesions, no acute signs of infection.      RADIOLOGY & ADDITIONAL STUDIES:  < from: Xray Foot AP + Lateral + Oblique, Bilat (10.29.23 @ 12:30) >  CC: [id number] EXAM:  XR ANKLE COMP MIN 3 VIEWS BI   ORDERED BY:  ZEINA GARAY     ACC: [id number] EXAM:  XR FOOT COMP MIN 3 VIEWS BI   ORDERED BY:  ZEINA GARAY     PROCEDURE DATE:  10/29/2023          INTERPRETATION:  3 views of bilateral feet and 3 views of the bilateral   ankles.    Clinical indications: Bilateral foot pain.    IMPRESSION:    Right foot and ankle: There is a nondisplaced fracture at the base of the   fifth metatarsal. Mild hallux valgus deformity is present.    Left foot and ankle: There is moderate hallux valgus deformity. There is   no fracture or dislocation.      < end of copied text >    < from: CT 3D Reconstruct w/ Workstation (10.29.23 @ 15:32) >  ACC: [id number] [facility]  [facility]   ORDERED BY:  ZEINA GARAY     ACC: [id number] EXAM:  CT FOOT ONLY BI   ORDERED BY:  ZEINA GARAY     PROCEDURE DATE:  10/29/2023          INTERPRETATION:  Exam Type: CT FOOT BILATERAL, CT3D RECONSTRUCTION W   WORKSTATION  Exam Date and Time: 10/29/2023 3:31 PM  Indication: Bilateral foot pain. Concern for Lisfranc injury.  Comparison: Same-day prior bilateral ankle and foot x-rays.    TECHNIQUE:  Multiplanar CT images of the bilateral ankle and feet were obtained   without contrast. 3-D reformatted images were also obtained for   evaluation.    FINDINGS:  Right foot: There is acute comminuted fracture at the anterior process of   the calcaneus adjacent to the calcaneocuboid articulation. There is acute   nondisplaced fracture at the navicular, preferentially along its medial   aspect. Again seen is acute nondisplaced fracture at the base of the   fifth metatarsal. Punctate ossification is noted about the posterolateral   talus. This may reflect a small avulsion type fracture. There is no   widening or fracture at the Lisfranc articulation. There is subcutaneous   swelling about the foot. Visualized tendons are grossly intact. Mild   hallux valgus alignment alignment.    Left foot: There is acute minimally displaced fractures at the medial   cuneiform and across the plantar surfaces of the first, second, third and   fourth metatarsal bases. Few small ossifications are present about the   fracture sites. Although there is no widening at the Lisfranc interval, a   Lisfranc injury is expected given the location of these fractures.   Subcutaneous swelling about the foot. Visualized tendons are grossly   intact. There is moderate hallux valgus alignment.      IMPRESSION:  Multiple fractures at the bilateral feet with a Lisfranc injury at the   left foot and no evidence of a Lisfranc injury at the right foot as   described.    < end of copied text >   Patient is a 68y old  Female who presents with a chief complaint of     HPI:  69 y/o F with PMHx of COPD (on home O2, 2L 24/7), HTN, HLD and PSHx of cholecystectomy 1 month ago, presents to ED for evaluation of bilateral foot and ankle pain following mechanical fall this morning. States she was walking down steps in her home, when she slipped on the 2nd step from the bottom, inverting both ankles. Has been unable to bear weight since. Recalls all details from the fall, did not hit her head and is not on anticoagulation. Denies recent illness, chest pain/dizziness prior to the fall or currently, pain in the knees/hips/back/neck or upper extremities. NKDA. Given Toradol by EMS PTA.    PAST MEDICAL & SURGICAL HISTORY:  UTI (urinary tract infection)      Hypertension      Hyperlipidemia      Type 2 diabetes mellitus      Overactive bladder      COPD, mild      DM (diabetes mellitus)      No significant past surgical history      S/P hip replacement, right      No significant past surgical history          MEDICATIONS  (STANDING):    MEDICATIONS  (PRN):      Allergies    No Known Allergies    Intolerances        VITALS:    Vital Signs Last 24 Hrs  T(C): 36.6 (29 Oct 2023 11:35), Max: 36.6 (29 Oct 2023 11:35)  T(F): 97.9 (29 Oct 2023 11:35), Max: 97.9 (29 Oct 2023 11:35)  HR: 68 (29 Oct 2023 12:10) (68 - 76)  BP: 116/80 (29 Oct 2023 12:10) (116/80 - 124/78)  BP(mean): --  RR: 18 (29 Oct 2023 12:10) (18 - 20)  SpO2: 90% (29 Oct 2023 12:10) (90% - 93%)    Parameters below as of 29 Oct 2023 12:10  Patient On (Oxygen Delivery Method): nasal cannula  O2 Flow (L/min): 2      LABS:                          13.5   8.04  )-----------( 431      ( 29 Oct 2023 12:57 )             41.8       10-29    138  |  102  |  23  ----------------------------<  110<H>  4.3   |  26  |  0.63    Ca    9.8      29 Oct 2023 12:57    TPro  7.2  /  Alb  4.4  /  TBili  0.2  /  DBili  x   /  AST  15  /  ALT  10  /  AlkPhos  38<L>  10-29      CAPILLARY BLOOD GLUCOSE              LOWER EXTREMITY PHYSICAL EXAM:    Vascular: DP/PT 2/4, B/L, CFT <3 seconds B/L, Temperature gradient warm to cool, B/L.   Neuro: Epicritic sensation intact to the level of digits, B/L.  Musculoskeletal/Ortho: unremarkable  Skin: Right foot posterior lateral ecchymosis, moderate global edema, no open wounds or lesions, no acute signs of infection. Left foot mild global edema, no open wounds or lesions, no acute signs of infection.      RADIOLOGY & ADDITIONAL STUDIES:  < from: Xray Foot AP + Lateral + Oblique, Bilat (10.29.23 @ 12:30) >  CC: [id number] EXAM:  XR ANKLE COMP MIN 3 VIEWS BI   ORDERED BY:  ZEINA GARAY     ACC: [id number] EXAM:  XR FOOT COMP MIN 3 VIEWS BI   ORDERED BY:  ZEINA GARAY     PROCEDURE DATE:  10/29/2023          INTERPRETATION:  3 views of bilateral feet and 3 views of the bilateral   ankles.    Clinical indications: Bilateral foot pain.    IMPRESSION:    Right foot and ankle: There is a nondisplaced fracture at the base of the   fifth metatarsal. Mild hallux valgus deformity is present.    Left foot and ankle: There is moderate hallux valgus deformity. There is   no fracture or dislocation.      < end of copied text >    < from: CT 3D Reconstruct w/ Workstation (10.29.23 @ 15:32) >  ACC: [id number] EXAM:  [facility]   ORDERED BY:  ZEINA GARAY     ACC: [id number] EXAM:  CT FOOT ONLY BI   ORDERED BY:  ZEINA GARAY     PROCEDURE DATE:  10/29/2023          INTERPRETATION:  Exam Type: CT FOOT BILATERAL, CT3D RECONSTRUCTION W   WORKSTATION  Exam Date and Time: 10/29/2023 3:31 PM  Indication: Bilateral foot pain. Concern for Lisfranc injury.  Comparison: Same-day prior bilateral ankle and foot x-rays.    TECHNIQUE:  Multiplanar CT images of the bilateral ankle and feet were obtained   without contrast. 3-D reformatted images were also obtained for   evaluation.    FINDINGS:  Right foot: There is acute comminuted fracture at the anterior process of   the calcaneus adjacent to the calcaneocuboid articulation. There is acute   nondisplaced fracture at the navicular, preferentially along its medial   aspect. Again seen is acute nondisplaced fracture at the base of the   fifth metatarsal. Punctate ossification is noted about the posterolateral   talus. This may reflect a small avulsion type fracture. There is no   widening or fracture at the Lisfranc articulation. There is subcutaneous   swelling about the foot. Visualized tendons are grossly intact. Mild   hallux valgus alignment alignment.    Left foot: There is acute minimally displaced fractures at the medial   cuneiform and across the plantar surfaces of the first, second, third and   fourth metatarsal bases. Few small ossifications are present about the   fracture sites. Although there is no widening at the Lisfranc interval, a   Lisfranc injury is expected given the location of these fractures.   Subcutaneous swelling about the foot. Visualized tendons are grossly   intact. There is moderate hallux valgus alignment.      IMPRESSION:  Multiple fractures at the bilateral feet with a Lisfranc injury at the   left foot and no evidence of a Lisfranc injury at the right foot as   described.    < end of copied text >   Patient is a 68y old  Female who presents with a chief complaint of     HPI:  69 y/o F with PMHx of COPD (on home O2, 2L 24/7), HTN, HLD and PSHx of cholecystectomy 1 month ago, presents to ED for evaluation of bilateral foot and ankle pain following mechanical fall this morning. States she was walking down steps in her home, when she slipped on the 2nd step from the bottom, inverting both ankles. Has been unable to bear weight since. Recalls all details from the fall, did not hit her head and is not on anticoagulation. Denies recent illness, chest pain/dizziness prior to the fall or currently, pain in the knees/hips/back/neck or upper extremities. NKDA. Given Toradol by EMS PTA.    PAST MEDICAL & SURGICAL HISTORY:  UTI (urinary tract infection)      Hypertension      Hyperlipidemia      Type 2 diabetes mellitus      Overactive bladder      COPD, mild      DM (diabetes mellitus)      No significant past surgical history      S/P hip replacement, right      No significant past surgical history          MEDICATIONS  (STANDING):    MEDICATIONS  (PRN):      Allergies    No Known Allergies    Intolerances        VITALS:    Vital Signs Last 24 Hrs  T(C): 36.6 (29 Oct 2023 11:35), Max: 36.6 (29 Oct 2023 11:35)  T(F): 97.9 (29 Oct 2023 11:35), Max: 97.9 (29 Oct 2023 11:35)  HR: 68 (29 Oct 2023 12:10) (68 - 76)  BP: 116/80 (29 Oct 2023 12:10) (116/80 - 124/78)  BP(mean): --  RR: 18 (29 Oct 2023 12:10) (18 - 20)  SpO2: 90% (29 Oct 2023 12:10) (90% - 93%)    Parameters below as of 29 Oct 2023 12:10  Patient On (Oxygen Delivery Method): nasal cannula  O2 Flow (L/min): 2      LABS:                          13.5   8.04  )-----------( 431      ( 29 Oct 2023 12:57 )             41.8       10-29    138  |  102  |  23  ----------------------------<  110<H>  4.3   |  26  |  0.63    Ca    9.8      29 Oct 2023 12:57    TPro  7.2  /  Alb  4.4  /  TBili  0.2  /  DBili  x   /  AST  15  /  ALT  10  /  AlkPhos  38<L>  10-29      CAPILLARY BLOOD GLUCOSE              LOWER EXTREMITY PHYSICAL EXAM:    Vascular: DP/PT 2/4, B/L, CFT <3 seconds B/L, Temperature gradient warm to cool, B/L.   Neuro: Epicritic sensation intact to the level of digits, B/L.  Musculoskeletal/Ortho: unremarkable  Skin: Right foot posterior lateral ecchymosis, moderate global edema, no open wounds or lesions, no acute signs of infection. Left foot mild global edema, no open wounds or lesions, no acute signs of infection.      RADIOLOGY & ADDITIONAL STUDIES:  < from: Xray Foot AP + Lateral + Oblique, Bilat (10.29.23 @ 12:30) >  CC: [id number] EXAM:  XR ANKLE COMP MIN 3 VIEWS BI   ORDERED BY:  ZEINA GARAY     ACC: [id number] EXAM:  XR FOOT COMP MIN 3 VIEWS BI   ORDERED BY:  ZEINA GARAY     PROCEDURE DATE:  10/29/2023          INTERPRETATION:  3 views of bilateral feet and 3 views of the bilateral   ankles.    Clinical indications: Bilateral foot pain.    IMPRESSION:    Right foot and ankle: There is a nondisplaced fracture at the base of the   fifth metatarsal. Mild hallux valgus deformity is present.    Left foot and ankle: There is moderate hallux valgus deformity. There is   no fracture or dislocation.      < end of copied text >    < from: CT 3D Reconstruct w/ Workstation (10.29.23 @ 15:32) >  ACC: [id number] EXAM:  [facility]   ORDERED BY:  ZEINA GARAY     ACC: [id number] EXAM:  CT FOOT ONLY BI   ORDERED BY:  ZEINA GARAY     PROCEDURE DATE:  10/29/2023          INTERPRETATION:  Exam Type: CT FOOT BILATERAL, CT3D RECONSTRUCTION W   WORKSTATION  Exam Date and Time: 10/29/2023 3:31 PM  Indication: Bilateral foot pain. Concern for Lisfranc injury.  Comparison: Same-day prior bilateral ankle and foot x-rays.    TECHNIQUE:  Multiplanar CT images of the bilateral ankle and feet were obtained   without contrast. 3-D reformatted images were also obtained for   evaluation.    FINDINGS:  Right foot: There is acute comminuted fracture at the anterior process of   the calcaneus adjacent to the calcaneocuboid articulation. There is acute   nondisplaced fracture at the navicular, preferentially along its medial   aspect. Again seen is acute nondisplaced fracture at the base of the   fifth metatarsal. Punctate ossification is noted about the posterolateral   talus. This may reflect a small avulsion type fracture. There is no   widening or fracture at the Lisfranc articulation. There is subcutaneous   swelling about the foot. Visualized tendons are grossly intact. Mild   hallux valgus alignment alignment.    Left foot: There is acute minimally displaced fractures at the medial   cuneiform and across the plantar surfaces of the first, second, third and   fourth metatarsal bases. Few small ossifications are present about the   fracture sites. Although there is no widening at the Lisfranc interval, a   Lisfranc injury is expected given the location of these fractures.   Subcutaneous swelling about the foot. Visualized tendons are grossly   intact. There is moderate hallux valgus alignment.      IMPRESSION:  Multiple fractures at the bilateral feet with a Lisfranc injury at the   left foot and no evidence of a Lisfranc injury at the right foot as   described.    < end of copied text >

## 2023-10-29 NOTE — CONSULT NOTE ADULT - ASSESSMENT
68F presents with Right foot fifth metatarsal base fracture, Left foot lisfranc injury  - Patient seen and evaluated  - Afebrile, WBC 8.04  - Right foot posterior lateral ecchymosis, moderate global edema, no open wounds or lesions, no acute signs of infection. Left foot mild global edema, no open wounds or lesions, no acute signs of infection.  - Bilateral foot xray: Right foot nondisplaced fracture of 5th metatarsal base, Left foot no fracture  - Left foot CT: Lisfranc injury with acute minimally displaced fractures at the medial cuneiform and across the plantar surfaces of the first, second, third and   fourth metatarsal bases.  - Applied singh compression with surgical shoe to right foot, Applied singh compression with posterior splint to the left foot  - no cultures 2/2 to no open wounds  - Discussed with patient rehab vs ability to remain NWB at home. Pt states that she will not require rehab and lives with family who can assist her.  - Non-weight bearing to the left foot  - Weight bearing as tolerated to the right foot  - Patient is stable from podiatry standpoint and can follow up outpatient with Dr. Murphy [phone] within 1 week of discharge.  - Discussed with attending   68F presents with Right foot fifth metatarsal base fracture, Left foot lisfranc injury  - Patient seen and evaluated  - Afebrile, WBC 8.04  - Right foot posterior lateral ecchymosis, moderate global edema, no open wounds or lesions, no acute signs of infection. Left foot mild global edema, no open wounds or lesions, no acute signs of infection.  - Bilateral foot xray: Right foot nondisplaced fracture of 5th metatarsal base, Left foot no fracture  - Left foot CT: Lisfranc injury with acute minimally displaced fractures at the medial cuneiform and across the plantar surfaces of the first, second, third and   fourth metatarsal bases.  - Applied singh compression with surgical shoe to right foot, Applied singh compression with posterior splint to the left foot  - no cultures 2/2 to no open wounds  - Discussed with patient rehab vs ability to remain NWB at home. Pt states that she will not require rehab and lives with family who can assist her.  - Non-weight bearing to the left foot  - Weight bearing as tolerated to the right foot  - Patient is stable from podiatry standpoint and can follow up outpatient with Dr. Murphy [phone] within 1 week of discharge.  - Discussed with attending   68F presents with Right foot fifth metatarsal base fracture, Left foot lisfranc injury  - Patient seen and evaluated  - Afebrile, WBC 8.04  - Right foot posterior lateral ecchymosis, moderate global edema, no open wounds or lesions, no acute signs of infection. Left foot mild global edema, no open wounds or lesions, no acute signs of infection.  - Bilateral foot xray: Right foot nondisplaced fracture of 5th metatarsal base, Left foot no fracture  - Left foot CT: Lisfranc injury with acute minimally displaced fractures at the medial cuneiform and across the plantar surfaces of the first, second, third and   fourth metatarsal bases.  - Applied singh compression with surgical shoe to right foot, Applied singh compression with posterior splint to the left foot  - no cultures 2/2 to no open wounds  - Discussed with patient rehab vs ability to remain NWB at home. Pt states that she will not require rehab and lives with family who can assist her.  - Non-weight bearing to the left foot  - Weight bearing as tolerated to the right foot  - Patient is stable from podiatry standpoint and can follow up outpatient with Dr. Murphy [phone] within 1 week of discharge.  - Discussed with attending

## 2023-10-29 NOTE — ED ADULT NURSE NOTE - OBJECTIVE STATEMENT
68y f pt arrived via ems; emt reports pt slipped on bottom 2 steps of stairs this am; slid down last 2 while inverting her ankles; pt unable to weight bear; emt placed iv and administered 15mg of toradol enroute; pt aox3; no resp distress; no sob; pt states no LOC; no head injury; no syncope; pt states slipped and twisted ankles; no chest pain; + periph pulses; bilat swelling to feet/ankles; pt able to move toes; denies numbness/tingling; pt recently dx with copd/emphysema and is on 2L NC 24/7; pt states recent ollie; normal O2 is 90%; iv placed by emt 20g in left hand; pt medicated per order

## 2023-10-29 NOTE — H&P ADULT - HISTORY OF PRESENT ILLNESS
69 y/o F with PMHx of COPD (on home O2, 2L 24/7), HTN, HLD and PSHx of cholecystectomy 1 month ago, presents to ED for evaluation of bilateral foot and ankle pain following mechanical fall this morning. States she was walking down steps in her home, when she slipped on the 2nd step from the bottom, inverting both ankles. Has been unable to bear weight since. Recalls all details from the fall, did not hit her head and is not on anticoagulation. Denies recent illness, chest pain/dizziness prior to the fall or currently, pain in the knees/hips/back/neck or upper extremities. NKDA. Given Toradol by EMS PTA.

## 2023-10-29 NOTE — H&P ADULT - NSHPLABSRESULTS_GEN_ALL_CORE
13.5   8.04  )-----------( 431      ( 29 Oct 2023 12:57 )             41.8       10-29    138  |  102  |  23  ----------------------------<  110<H>  4.3   |  26  |  0.63    Ca    9.8      29 Oct 2023 12:57    TPro  7.2  /  Alb  4.4  /  TBili  0.2  /  DBili  x   /  AST  15  /  ALT  10  /  AlkPhos  38<L>  10-29              Urinalysis Basic - ( 29 Oct 2023 12:57 )    Color: x / Appearance: x / SG: x / pH: x  Gluc: 110 mg/dL / Ketone: x  / Bili: x / Urobili: x   Blood: x / Protein: x / Nitrite: x   Leuk Esterase: x / RBC: x / WBC x   Sq Epi: x / Non Sq Epi: x / Bacteria: x    < from: CT Foot No Cont, Bilateral (10.29.23 @ 15:31) >    IMPRESSION:  Multiple fractures at the bilateral feet with a Lisfranc injury at the   left foot and no evidence of a Lisfranc injury at the right foot as   described.    < from: Xray Foot AP + Lateral + Oblique, Bilat (10.29.23 @ 12:30) >    IMPRESSION:    Right foot and ankle: There is a nondisplaced fracture at the base of the   fifth metatarsal. Mild hallux valgus deformity is present.    Left foot and ankle: There is moderate hallux valgus deformity. There is   no fracture or dislocation.    < end of copied text >    EKG SR T inv in ant ;cecil

## 2023-10-29 NOTE — ED PROVIDER NOTE - CARE PLAN
Principal Discharge DX:	Lisfranc dislocation, left, initial encounter  Secondary Diagnosis:	Escalante fracture   1

## 2023-10-29 NOTE — ED PROVIDER NOTE - PHYSICAL EXAMINATION
Gen: well appearing, in no acute distress   Head: normal appearing  HEENT: normal conjunctiva, oral mucosa moist, vision grossly intact   Lung: no respiratory distress, speaking in full sentences, CTA b/l, no wheeze, crackles or rhonchi, wearing supplemental O2, breathing comfortably   CV: regular rate and rhythm, no murmurs  Abd: soft, non distended, non tender   MSK: R foot with ecchymosis, swelling and pain to dorsal/lateral aspect, limited ROM 2/2 pain, neurovascular intact, L foot with dorsal/medial ecchymosis, swelling and tenderness, neurovascular intact, no knee/hip or midline spinal tenderness   Neuro: No focal deficits, AAOx3  Skin: Warm, no rashes   Psych: normal affect

## 2023-10-29 NOTE — ED PROVIDER NOTE - OBJECTIVE STATEMENT
69 y/o F with PMHx of COPD (on home O2), HTN, HLD and PSHx of cholecystectomy 1 month ago, presents to ED for evaluation of bilateral foot and ankle pain following mechanical fall this morning. States she was walking down steps in her home, when she slipped on the 2nd step from the bottom, inverting both ankles. Has been unable to bear weight since. Recalls all details from the fall, did not hit her head and is not on anticoagulation. Denies recent illness, chest pain/dizziness prior to the fall or currently, pain in the knees/hips/back/neck or upper extremities. NKDA. Given Toradol by EMS PTA. 69 y/o F with PMHx of COPD (on home O2, 2L 24/7), HTN, HLD and PSHx of cholecystectomy 1 month ago, presents to ED for evaluation of bilateral foot and ankle pain following mechanical fall this morning. States she was walking down steps in her home, when she slipped on the 2nd step from the bottom, inverting both ankles. Has been unable to bear weight since. Recalls all details from the fall, did not hit her head and is not on anticoagulation. Denies recent illness, chest pain/dizziness prior to the fall or currently, pain in the knees/hips/back/neck or upper extremities. NKDA. Given Toradol by EMS PTA.

## 2023-10-29 NOTE — ED PROVIDER NOTE - PROGRESS NOTE DETAILS
Sukumar PGY1: based on imaging in PACS, appears patient has R singh fracture. ? left lisfranc injury vs. fracture at base of 2nd L metatarsal. consulted podiatry who is recommending CT scan of L foot to eval for lisfranc injury, ordered scan of ERAN feet given fracture on R as well. podiatry also recommending R singh compression dressing and surgical shoe. will update podiatry team once CT results are back. Sukumar PGY1: patient's home has large staircase to get up to her home, concern regarding ability to ambulate if both feet have fractures. will order basic labs and EKG if patient will require medical admission/PT consult. awaiting CT at this time. Sukumar PGY1: x-rays with official read, JEREMY arguelles, L foot/ankle unremarkable, touched based with podiatry, unsure if CT warranted now that x-ray of L is unremarkable. podiatry contacted, and they are currently at bedside Sukumar PGY1: failed ambulation trial on crutches, very unsteady, unable to coordinate with both feet in spinlt/wraps. will plan to admit, as patient likely needs PT consult. non operative from podiatry standpoint. Sukumar PGY1: spoke with podiatry, patient does have L lisfranc injury and R singh fx. instructed to be non weight bearing on the L, limited weight on R foot with surgical shoe. patient would feel comfortable with going home, but would need transport to get back given supplemental O2 and limited mobility. will trial ambulation on crutches and d/c home with podiatry followup

## 2023-10-29 NOTE — ED ADULT NURSE NOTE - NSFALLRISKINTERV_ED_ALL_ED

## 2023-10-29 NOTE — H&P ADULT - NSHPREVIEWOFSYSTEMS_GEN_ALL_CORE
REVIEW OF SYSTEMS:    CONSTITUTIONAL: No weakness, fevers or chills   EYES/ENT: No visual changes;  No vertigo or throat pain   NECK: No pain or stiffness  RESPIRATORY: No cough, wheezing, hemoptysis; No shortness of breath  CARDIOVASCULAR: No chest pain or palpitations  GASTROINTESTINAL: No abdominal or epigastric pain. No nausea, vomiting, or hematemesis; No diarrhea or constipation. No melena or hematochezia.  GENITOURINARY: No dysuria, frequency or hematuria  NEUROLOGICAL: No numbness or weakness  SKIN: No itching, burning, rashes, or lesions   All other review of systems is negative unless indicated above.

## 2023-10-29 NOTE — H&P ADULT - NSHPPHYSICALEXAM_GEN_ALL_CORE
PHYSICAL EXAMINATION:  Vital Signs Last 24 Hrs  T(C): 36.6 (29 Oct 2023 11:35), Max: 36.6 (29 Oct 2023 11:35)  T(F): 97.9 (29 Oct 2023 11:35), Max: 97.9 (29 Oct 2023 11:35)  HR: 68 (29 Oct 2023 12:10) (68 - 76)  BP: 116/80 (29 Oct 2023 12:10) (116/80 - 124/78)  BP(mean): --  RR: 18 (29 Oct 2023 12:10) (18 - 20)  SpO2: 90% (29 Oct 2023 12:10) (90% - 93%)    Parameters below as of 29 Oct 2023 12:10  Patient On (Oxygen Delivery Method): nasal cannula  O2 Flow (L/min): 2    CAPILLARY BLOOD GLUCOSE          GENERAL: NAD, well-groomed, well-developed  HEAD:  atraumatic, normocephalic  EYES: sclera anicteric  ENMT: mucous membranes moist  NECK: supple, No JVD  CHEST/LUNG: clear to auscultation bilaterally; no rales, rhonchi, or wheezing b/l  HEART: normal S1, S2  ABDOMEN: BS+, soft, ND, NT   EXTREMITIES: bilateral feet in soft cast, support  NEURO: awake, alert, interactive; moves all extremities  SKIN: no rashes or lesions

## 2023-10-30 LAB
GLUCOSE BLDC GLUCOMTR-MCNC: 105 MG/DL — HIGH (ref 70–99)
GLUCOSE BLDC GLUCOMTR-MCNC: 105 MG/DL — HIGH (ref 70–99)
GLUCOSE BLDC GLUCOMTR-MCNC: 111 MG/DL — HIGH (ref 70–99)
GLUCOSE BLDC GLUCOMTR-MCNC: 111 MG/DL — HIGH (ref 70–99)
GLUCOSE BLDC GLUCOMTR-MCNC: 117 MG/DL — HIGH (ref 70–99)
GLUCOSE BLDC GLUCOMTR-MCNC: 117 MG/DL — HIGH (ref 70–99)
GLUCOSE BLDC GLUCOMTR-MCNC: 121 MG/DL — HIGH (ref 70–99)
GLUCOSE BLDC GLUCOMTR-MCNC: 121 MG/DL — HIGH (ref 70–99)

## 2023-10-30 PROCEDURE — 93010 ELECTROCARDIOGRAM REPORT: CPT

## 2023-10-30 RX ORDER — NICOTINE POLACRILEX 2 MG
4 GUM BUCCAL THREE TIMES A DAY
Refills: 0 | Status: DISCONTINUED | OUTPATIENT
Start: 2023-10-30 | End: 2023-11-03

## 2023-10-30 RX ORDER — ACETAMINOPHEN 500 MG
1000 TABLET ORAL ONCE
Refills: 0 | Status: COMPLETED | OUTPATIENT
Start: 2023-10-30 | End: 2023-10-30

## 2023-10-30 RX ADMIN — ENOXAPARIN SODIUM 40 MILLIGRAM(S): 100 INJECTION SUBCUTANEOUS at 05:33

## 2023-10-30 RX ADMIN — Medication 145 MILLIGRAM(S): at 11:55

## 2023-10-30 RX ADMIN — Medication 3 MILLILITER(S): at 11:54

## 2023-10-30 RX ADMIN — Medication 400 MILLIGRAM(S): at 18:49

## 2023-10-30 RX ADMIN — Medication 10 MILLIGRAM(S): at 17:33

## 2023-10-30 RX ADMIN — Medication 1000 MILLIGRAM(S): at 19:19

## 2023-10-30 RX ADMIN — Medication 0.5 MILLIGRAM(S): at 21:03

## 2023-10-30 RX ADMIN — Medication 10 MILLIGRAM(S): at 05:33

## 2023-10-30 RX ADMIN — Medication 400 MILLIGRAM(S): at 11:55

## 2023-10-30 RX ADMIN — QUETIAPINE FUMARATE 100 MILLIGRAM(S): 200 TABLET, FILM COATED ORAL at 21:04

## 2023-10-30 RX ADMIN — Medication 0.5 MILLIGRAM(S): at 10:00

## 2023-10-30 RX ADMIN — Medication 30 MILLIGRAM(S): at 11:55

## 2023-10-30 RX ADMIN — PANTOPRAZOLE SODIUM 40 MILLIGRAM(S): 20 TABLET, DELAYED RELEASE ORAL at 05:33

## 2023-10-30 RX ADMIN — Medication 3 MILLILITER(S): at 05:33

## 2023-10-30 RX ADMIN — Medication 3 MILLILITER(S): at 23:16

## 2023-10-30 RX ADMIN — Medication 4 MILLIGRAM(S): at 16:11

## 2023-10-30 RX ADMIN — TIOTROPIUM BROMIDE 2 PUFF(S): 18 CAPSULE ORAL; RESPIRATORY (INHALATION) at 12:25

## 2023-10-30 RX ADMIN — Medication 3 MILLILITER(S): at 17:33

## 2023-10-30 RX ADMIN — MIRTAZAPINE 45 MILLIGRAM(S): 45 TABLET, ORALLY DISINTEGRATING ORAL at 21:03

## 2023-10-30 NOTE — PHYSICAL THERAPY INITIAL EVALUATION ADULT - ADDITIONAL COMMENTS
Pt lives in a private house with her daughter and daughter's family. Pt has 4 steps to enter with + handrail and stairs to bedroom. Pt lives in a private house with her daughter and daughter's family. Pt has 4 steps to enter with + handrail and 7 steps to her apt in the basement with + handrail. Pt has O2 at home , on 2L/min O2 via nc prn PTA.

## 2023-10-30 NOTE — CONSULT NOTE ADULT - ASSESSMENT
A/p  69 y/o F with PMHx of COPD (on home O2, 2L 24/7), HTN, HLD and PSHx of cholecystectomy 1 month ago, presents to ED for evaluation of bilateral foot and ankle pain following mechanical fall this morning.    #Fall  -Mechanical as per patient report  -No cardiac prodromes prior to fall, no LOC  -HST negative x2  -EKG with apparent changes (new TWI V1-V5)  -Please get ekg  -Per patient no recent cp, sob, zhong, dizziness or syncope  -Recent echo 8/2023 with nml lv fxn    #Right foot fifth metatarsal base fracture  #Left foot lisfranc injury  -As noted on XR  -Podiatry following  -No surgical intervention per pod    #HTN  -BP stable off bp meds      dvt ppx

## 2023-10-30 NOTE — CONSULT NOTE ADULT - SUBJECTIVE AND OBJECTIVE BOX
CARDIOLOGY CONSULT - Dr. Begum         HPI:  69 y/o F with PMHx of COPD (on home O2, 2L 24/7), HTN, HLD and PSHx of cholecystectomy 1 month ago, presents to ED for evaluation of bilateral foot and ankle pain following mechanical fall this morning. States she was walking down steps in her home, when she slipped on the 2nd step from the bottom, inverting both ankles. Has been unable to bear weight since. Recalls all details from the fall, did not hit her head and is not on anticoagulation. Denies recent illness, chest pain/dizziness prior to the fall or currently, pain in the knees/hips/back/neck or upper extremities. NKDA. Given Toradol by EMS PTA. (29 Oct 2023 18:39)      PAST MEDICAL & SURGICAL HISTORY:  UTI (urinary tract infection)      Hypertension      Hyperlipidemia      Type 2 diabetes mellitus      Overactive bladder      COPD, mild      DM (diabetes mellitus)      No significant past surgical history      S/P hip replacement, right      No significant past surgical history      PREVIOUS DIAGNOSTIC TESTING:    [x] Echocardiogram:  < from: TTE W or WO Ultrasound Enhancing Agent (08.22.23 @ 12:51) >  CONCLUSIONS:      1. Endocardial resolution is suboptimal despite the use of Definity.   2. Normal left ventricular cavity size. Left ventricular systolic function is normal with an ejection fraction of 65 % by Jerez's method of disks.    < end of copied text >    [ ]  Catheterization:  [ ] Stress Test:  	    MEDICATIONS:  Home Medications:  clonazePAM 0.5 mg oral tablet: 1 tab(s) orally every 12 hours AM and PM (29 Oct 2023 18:34)  fenofibrate 160 mg oral tablet: 1 tab(s) orally once a day (29 Oct 2023 18:34)  fesoterodine 4 mg oral tablet, extended release: 1 tab(s) orally once a day (29 Oct 2023 18:39)  metFORMIN 500 mg oral tablet: 1 tab(s) orally 2 times a day (29 Oct 2023 18:34)  mirtazapine 45 mg oral tablet: 1 tab(s) orally once a day (at bedtime) (29 Oct 2023 18:34)  Myrbetriq 25 mg oral tablet, extended release: 1 tab(s) orally once a day (29 Oct 2023 18:34)  omeprazole 40 mg oral delayed release capsule: 1 cap(s) orally once a day (29 Oct 2023 18:34)  oxyBUTYnin 5 mg oral tablet: 1 tab(s) orally 2 times a day (29 Oct 2023 18:34)  PARoxetine 30 mg oral tablet: 1 tab(s) orally once a day (29 Oct 2023 18:34)  pravastatin 20 mg oral tablet: 1 tab(s) orally once a day (29 Oct 2023 18:34)  QUEtiapine 100 mg oral tablet: 1 tab(s) orally once a day (29 Oct 2023 18:39)  theophylline 400 mg/24 hours oral capsule, extended release: 1 cap(s) orally once a day (29 Oct 2023 18:39)  Trelegy Ellipta 200 mcg-62.5 mcg-25 mcg/inh inhalation powder: 1 puff(s) inhaled once a day (29 Oct 2023 18:39)      MEDICATIONS  (STANDING):  albuterol/ipratropium for Nebulization 3 milliLiter(s) Nebulizer every 6 hours  clonazePAM  Tablet 0.5 milliGRAM(s) Oral every 12 hours  dextrose 5%. 1000 milliLiter(s) (100 mL/Hr) IV Continuous <Continuous>  dextrose 5%. 1000 milliLiter(s) (50 mL/Hr) IV Continuous <Continuous>  dextrose 50% Injectable 12.5 Gram(s) IV Push once  dextrose 50% Injectable 25 Gram(s) IV Push once  dextrose 50% Injectable 25 Gram(s) IV Push once  enoxaparin Injectable 40 milliGRAM(s) SubCutaneous every 24 hours  fenofibrate Tablet 145 milliGRAM(s) Oral daily  glucagon  Injectable 1 milliGRAM(s) IntraMuscular once  insulin lispro (ADMELOG) corrective regimen sliding scale   SubCutaneous at bedtime  insulin lispro (ADMELOG) corrective regimen sliding scale   SubCutaneous three times a day before meals  mirtazapine 45 milliGRAM(s) Oral at bedtime  oxybutynin 10 milliGRAM(s) Oral two times a day  pantoprazole    Tablet 40 milliGRAM(s) Oral before breakfast  PARoxetine 30 milliGRAM(s) Oral daily  QUEtiapine 100 milliGRAM(s) Oral daily  theophylline ER (24 Hour) 400 milliGRAM(s) Oral daily  tiotropium 2.5 MICROgram(s) Inhaler 2 Puff(s) Inhalation daily      FAMILY HISTORY:      SOCIAL HISTORY:    [x] Non-smoker - FORMER smoker  [ ] Smoker  [ ] Alcohol    Allergies    No Known Allergies    Intolerances    	    REVIEW OF SYSTEMS:  CONSTITUTIONAL: No fever, weight loss, or fatigue  EYES: No eye pain, visual disturbances, or discharge  ENMT:  No difficulty hearing, tinnitus, vertigo; No sinus or throat pain  NECK: No pain or stiffness  RESPIRATORY: No cough, wheezing, chills or hemoptysis; No Shortness of Breath  CARDIOVASCULAR: No chest pain, palpitations, passing out, dizziness, or leg swelling  GASTROINTESTINAL: No abdominal or epigastric pain. No nausea, vomiting, or hematemesis; No diarrhea or constipation. No melena or hematochezia.  GENITOURINARY: No dysuria, frequency, hematuria, or incontinence  NEUROLOGICAL: No headaches, memory loss, loss of strength, numbness, or tremors  SKIN: No itching, burning, rashes, or lesions   	    [x] All others negative	  [ ] Unable to obtain    PHYSICAL EXAM:  T(C): 37 (10-30-23 @ 08:50), Max: 37 (10-30-23 @ 08:50)  HR: 80 (10-30-23 @ 08:50) (68 - 80)  BP: 127/70 (10-30-23 @ 08:50) (105/60 - 133/78)  RR: 20 (10-30-23 @ 08:51) (17 - 20)  SpO2: 90% (10-30-23 @ 08:51) (85% - 93%)  Wt(kg): --  I&O's Summary      Appearance: Normal	  Psychiatry: A & O x 3, Mood & affect appropriate  HEENT:   Normal oral mucosa, PERRL, EOMI	  Lymphatic: No lymphadenopathy  Cardiovascular: Normal S1 S2,RRR, No JVD, No murmurs  Respiratory: Lungs clear to auscultation	  Gastrointestinal:  Soft, Non-tender, + BS	  Skin: No rashes, No ecchymoses, No cyanosis	  Neurologic: Non-focal  Extremities: Normal range of motion, No clubbing, cyanosis or edema  Vascular: Peripheral pulses palpable 2+ bilaterally    TELEMETRY: 	    ECG:  NO EKG IN CHART  RADIOLOGY:  < from: Xray Ankle Complete 3 Views, Bilateral (10.29.23 @ 12:30) >    IMPRESSION:    Right foot and ankle: There is a nondisplaced fracture at the base of the   fifth metatarsal. Mild hallux valgus deformity is present.    Left foot and ankle: There is moderate hallux valgus deformity. There is   no fracture or dislocation.    --- End of Report ---    < end of copied text >    OTHER: 	  	  LABS:	 	    CARDIAC MARKERS:                                  13.5   8.04  )-----------( 431      ( 29 Oct 2023 12:57 )             41.8     10-29    138  |  102  |  23  ----------------------------<  110<H>  4.3   |  26  |  0.63    Ca    9.8      29 Oct 2023 12:57    TPro  7.2  /  Alb  4.4  /  TBili  0.2  /  DBili  x   /  AST  15  /  ALT  10  /  AlkPhos  38<L>  10-29      proBNP:   Lipid Profile:   HgA1c:   TSH:

## 2023-10-30 NOTE — CONSULT NOTE ADULT - TIME BILLING
Agree with above PA note.  A/p  67 y/o F with PMHx of COPD (on home O2, 2L 24/7), HTN, HLD and PSHx of cholecystectomy 1 month ago, presents to ED for evaluation of bilateral foot and ankle pain following mechanical fall this morning.    #Fall  -Mechanical per patient report  -no ACS  -EKG with reported new changes (new TWI V1-V5)  -repeat ekg w/o acute ischemic abnl   -no chest pain, dyspnea, HF  -Recent echo 8/2023 with nml lv fxn  -cont to monitor     #Right foot fifth metatarsal base fracture  #Left foot lisfranc injury  -As noted on XR  -Podiatry following  -No surgical intervention per pod    #HTN  -BP stable off bp meds

## 2023-10-30 NOTE — PHYSICAL THERAPY INITIAL EVALUATION ADULT - ACTIVE RANGE OF MOTION EXAMINATION, REHAB EVAL
L ankle not ssessed 2/2 splinted; min movement R ankle ace wrappd/bilateral upper extremity Active ROM was WFL (within functional limits)/bilateral  lower extremity Active ROM was WFL (within functional limits)

## 2023-10-30 NOTE — PHYSICAL THERAPY INITIAL EVALUATION ADULT - PLANNED THERAPY INTERVENTIONS, PT EVAL
Stairs  Goal: Pt will go up/down 10 steps scooting on buttocks independently in 2 weeks./balance training/gait training/strengthening/transfer training

## 2023-10-30 NOTE — PHYSICAL THERAPY INITIAL EVALUATION ADULT - PERTINENT HX OF CURRENT PROBLEM, REHAB EVAL
69 y/o F with PMHx of COPD (on home O2, 2L 24/7), HTN, HLD and PSHx of cholecystectomy 1 month ago, presents to ED for evaluation of bilateral foot and ankle pain following mechanical fall this morning. States she was walking down steps in her home, when she slipped on the 2nd step from the bottom, inverting both ankles. Has been unable to bear weight since. Recalls all details from the fall, did not hit her head and is not on anticoagulation. Denies recent illness, chest pain/dizziness prior to the fall or currently, pain in the knees/hips/back/neck or upper extremities. NKDA. Given Toradol by EMS PTA. 69 y/o F with PMHx of COPD (on home O2, 2L 24/7), HTN, HLD and PSHx of cholecystectomy 1 month ago, presents to ED for evaluation of bilateral foot and ankle pain following mechanical fall this morning. States she was walking down steps in her home, when she slipped on the 2nd step from the bottom, inverting both ankles. Has been unable to bear weight since. Recalls all details from the fall, did not hit her head and is not on anticoagulation. Denies recent illness, chest pain/dizziness prior to the fall or currently, pain in the knees/hips/back/neck or upper extremities. NKDA. Given Toradol by EMS PTA. CT Clint Feet: Multiple fractures at the bilateral feet with a Lisfranc injury at the left foot and no evidence of a Lisfranc injury at the right foot as described. CT 3D reconstruction: R foot: + acute comminuted fracture at the anterior process of the calcaneus adjacent to the calcaneocuboid articulation. + acute nondisplaced fracture at the navicular, preferentially along its medial aspect. Again seen is acute nondisplaced fracture at the base of the fifth metatarsal. Punctate ossification is noted about the posterolateral talus. This may reflect a small avulsion type fracture. There is no widening or fracture at the Lisfranc articulation. There is subcutaneous swelling about the foot. Visualized tendons are grossly intact. Mild hallux valgus alignment alignment. L foot: There is acute minimally displaced fractures at the medial cuneiform and across the plantar surfaces of the first, second, third and 4th metatarsal bases. Few small ossifications are present about the fracture sites. Although there is no widening at the Lisfranc interval, a Lisfranc injury is expected given the location of these fractures. Subcutaneous swelling about the foot. Visualized tendons are grossly intact. There is moderate hallux valgus alignment. Impression: Multiple fractures at the clint feet with a Lisfranc injury at the left foot and no evidence of a Lisfranc injury at the right foot as described. Podiatry consulted in ED: Applied singh compression with surgical shoe to R foot, Applied singh compression with posterior splint to the L foot. No cultures 2/2 to no open wounds. Discussed with pt rehab vs ability to remain NWB at home. Pt states that she will not require rehab and lives with family who can assist her. NWB L foot; WBAT to the R foot. 69 y/o F with PMHx of COPD (on home O2, 2L 24/7), HTN, HLD and PSHx of cholecystectomy 1 month ago, presents to ED for evaluation of bilateral foot and ankle pain following mechanical fall this morning. States she was walking down steps in her home, when she slipped on the 2nd step from the bottom, inverting both ankles. Has been unable to bear weight since. Recalls all details from the fall, did not hit her head and is not on anticoagulation. Denies recent illness, chest pain/dizziness prior to the fall or currently, pain in the knees/hips/back/neck or upper extremities. NKDA. Given Toradol by EMS PTA. XRay R foot and ankle: There is a nondisplaced fracture at the base of the fifth metatarsal. Mild hallux valgus deformity is present. XRay L foot and ankle: There is moderate hallux valgus deformity. There is no fracture or dislocation. CT Clint Feet: Multiple fractures at the bilateral feet with a Lisfranc injury at the left foot and no evidence of a Lisfranc injury at the right foot as described. CT 3D reconstruction: R foot: + acute comminuted fracture at the anterior process of the calcaneus adjacent to the calcaneocuboid articulation. + acute nondisplaced fracture at the navicular, preferentially along its medial aspect. Again seen is acute nondisplaced fracture at the base of the fifth metatarsal. Punctate ossification is noted about the posterolateral talus. This may reflect a small avulsion type fracture. There is no widening or fracture at the Lisfranc articulation. There is subcutaneous swelling about the foot. Visualized tendons are grossly intact. Mild hallux valgus alignment alignment. L foot: There is acute minimally displaced fractures at the medial cuneiform and across the plantar surfaces of the first, second, third and 4th metatarsal bases. Few small ossifications are present about the fracture sites. Although there is no widening at the Lisfranc interval, a Lisfranc injury is expected given the location of these fractures. Subcutaneous swelling about the foot. Visualized tendons are grossly intact. There is moderate hallux valgus alignment. Impression: Multiple fractures at the clint feet with a Lisfranc injury at the left foot and no evidence of a Lisfranc injury at the right foot as described. Podiatry consulted in ED: Applied singh compression with surgical shoe to R foot, Applied singh compression with posterior splint to the L foot. No cultures 2/2 to no open wounds. Discussed with pt rehab vs ability to remain NWB at home. Pt states that she will not require rehab and lives with family who can assist her. NWB L foot; WBAT to the R foot.

## 2023-10-31 ENCOUNTER — TRANSCRIPTION ENCOUNTER (OUTPATIENT)
Age: 68
End: 2023-10-31

## 2023-10-31 LAB
GLUCOSE BLDC GLUCOMTR-MCNC: 108 MG/DL — HIGH (ref 70–99)
GLUCOSE BLDC GLUCOMTR-MCNC: 108 MG/DL — HIGH (ref 70–99)
GLUCOSE BLDC GLUCOMTR-MCNC: 116 MG/DL — HIGH (ref 70–99)
GLUCOSE BLDC GLUCOMTR-MCNC: 116 MG/DL — HIGH (ref 70–99)
GLUCOSE BLDC GLUCOMTR-MCNC: 120 MG/DL — HIGH (ref 70–99)
GLUCOSE BLDC GLUCOMTR-MCNC: 120 MG/DL — HIGH (ref 70–99)
GLUCOSE BLDC GLUCOMTR-MCNC: 181 MG/DL — HIGH (ref 70–99)
GLUCOSE BLDC GLUCOMTR-MCNC: 181 MG/DL — HIGH (ref 70–99)

## 2023-10-31 PROCEDURE — 93321 DOPPLER ECHO F-UP/LMTD STD: CPT | Mod: 26

## 2023-10-31 PROCEDURE — 93308 TTE F-UP OR LMTD: CPT | Mod: 26

## 2023-10-31 RX ADMIN — Medication 145 MILLIGRAM(S): at 11:07

## 2023-10-31 RX ADMIN — Medication 10 MILLIGRAM(S): at 17:20

## 2023-10-31 RX ADMIN — MIRTAZAPINE 45 MILLIGRAM(S): 45 TABLET, ORALLY DISINTEGRATING ORAL at 21:10

## 2023-10-31 RX ADMIN — Medication 1: at 12:15

## 2023-10-31 RX ADMIN — ENOXAPARIN SODIUM 40 MILLIGRAM(S): 100 INJECTION SUBCUTANEOUS at 05:41

## 2023-10-31 RX ADMIN — TIOTROPIUM BROMIDE 2 PUFF(S): 18 CAPSULE ORAL; RESPIRATORY (INHALATION) at 11:07

## 2023-10-31 RX ADMIN — Medication 3 MILLILITER(S): at 11:06

## 2023-10-31 RX ADMIN — Medication 10 MILLIGRAM(S): at 05:41

## 2023-10-31 RX ADMIN — Medication 4 MILLIGRAM(S): at 21:28

## 2023-10-31 RX ADMIN — Medication 30 MILLIGRAM(S): at 11:07

## 2023-10-31 RX ADMIN — Medication 0.5 MILLIGRAM(S): at 10:06

## 2023-10-31 RX ADMIN — Medication 3 MILLILITER(S): at 23:19

## 2023-10-31 RX ADMIN — Medication 3 MILLILITER(S): at 05:40

## 2023-10-31 RX ADMIN — QUETIAPINE FUMARATE 100 MILLIGRAM(S): 200 TABLET, FILM COATED ORAL at 21:09

## 2023-10-31 RX ADMIN — Medication 400 MILLIGRAM(S): at 11:08

## 2023-10-31 RX ADMIN — Medication 0.5 MILLIGRAM(S): at 21:09

## 2023-10-31 RX ADMIN — PANTOPRAZOLE SODIUM 40 MILLIGRAM(S): 20 TABLET, DELAYED RELEASE ORAL at 05:41

## 2023-10-31 RX ADMIN — Medication 3 MILLILITER(S): at 17:20

## 2023-10-31 NOTE — DISCHARGE NOTE PROVIDER - NSDCFUADDAPPT_GEN_ALL_CORE_FT
Make appointments to follow up with your out patient physicians.  Bring all discharge paperwork including discharge medication list to your follow up appointments.   Podiatry follow up outpatient with Dr. Murphy [phone] within 1 week of discharge Make appointments to follow up with your out patient physicians.  Bring all discharge paperwork including discharge medication list to your follow up appointments.   Podiatry follow up outpatient with Dr. Murphy [phone] within 1 week of discharge    You must follow up with your primary medical doctor within one week of discharge - please call to make an appointment.                APPTS ARE READY TO BE MADE: [ X] YES    Best Family or Patient Contact (if needed):    Additional Information about above appointments (if needed):    1: Podiatrist  2: Primary medical doctor  3:     Other comments or requests:    Make appointments to follow up with your out patient physicians.  Bring all discharge paperwork including discharge medication list to your follow up appointments.   Podiatry follow up outpatient with Dr. Murphy [phone] within 1 week of discharge    You must follow up with your primary medical doctor within one week of discharge - please call to make an appointment.                APPTS ARE READY TO BE MADE: [ X] YES    Best Family or Patient Contact (if needed):    Additional Information about above appointments (if needed):    1: Podiatrist  2: Primary medical doctor  3:     Other comments or requests:     Patient is being discharged to rehab. Caregiver will arrange follow up.

## 2023-10-31 NOTE — DISCHARGE NOTE PROVIDER - NSDCFUSCHEDAPPT_GEN_ALL_CORE_FT
Willam Jones  [facility]  [facility] [address]  Scheduled Appointment: 01/17/2024

## 2023-10-31 NOTE — DISCHARGE NOTE PROVIDER - CARE PROVIDER_API CALL
REMEDIOS Murphy  podiatry  Phone: (   )    -  Fax: (   )    -  Follow Up Time:     Omari Begum  Cardiovascular Disease  [address], [address]  [place] [address]  Phone: [phone]  Fax: [phone]  Follow Up Time: 2 weeks

## 2023-10-31 NOTE — DISCHARGE NOTE PROVIDER - NSDCCPCAREPLAN_GEN_ALL_CORE_FT
PRINCIPAL DISCHARGE DIAGNOSIS  Diagnosis: Lisfranc dislocation, left, initial encounter  Assessment and Plan of Treatment: Escalante compression w/ posterior splint  Non weight bearing      SECONDARY DISCHARGE DIAGNOSES  Diagnosis: Fracture of metatarsal bone of right foot  Assessment and Plan of Treatment: right foot 5th metatarsal base fx- podiatry followed, pain control, applied Escalante compression with surgical shoe to right foot  Weight bearing as tolerated     PRINCIPAL DISCHARGE DIAGNOSIS  Diagnosis: Lisfranc dislocation, left, initial encounter  Assessment and Plan of Treatment: Escalante compression w/ posterior splint  Non weight bearing  Follow up with your podiatrist within one week of discharge.      SECONDARY DISCHARGE DIAGNOSES  Diagnosis: Fracture of metatarsal bone of right foot  Assessment and Plan of Treatment: right foot 5th metatarsal base fx- podiatry followed, pain control, applied Escalante compression with surgical shoe to right foot  Weight bearing as tolerated    Diagnosis: Hypertension  Assessment and Plan of Treatment: Low salt diet  Activity as tolerated.  Take all medication as prescribed.  Follow up with your medical doctor for routine blood pressure monitoring at your next visit.  Notify your doctor if you have any of the following symptoms:   Dizziness, Lightheadedness, Blurry vision, Headache, Chest pain, Shortness of breath      Diagnosis: COPD (chronic obstructive pulmonary disease)  Assessment and Plan of Treatment: Call your Health Care provider upon arrival home to make a follow up appointment within one week.  Take all inhalers as prescribed by your Health Care Provider.  Take steroids as prescribed by your Health Care Provider.  If your cough increases infrequency and severity and/or you have shortness of breath or increased shortness of breath call your Health Care Provider.  If you develop fever, chills, night sweats, malaise, and/or change in mental status call your Health care Provider.  Nutrition is very important.  Eat small frequent meals.  Increase your activity as tolerated.  Do not stay in bed all day       PRINCIPAL DISCHARGE DIAGNOSIS  Diagnosis: Lisfranc dislocation, left, initial encounter  Assessment and Plan of Treatment: Escalante compression w/ posterior splint  Non weight bearing  Follow up with your podiatrist within one week of discharge.      SECONDARY DISCHARGE DIAGNOSES  Diagnosis: Fracture of metatarsal bone of right foot  Assessment and Plan of Treatment: right foot 5th metatarsal base fx- podiatry followed, pain control, applied Escalante compression with surgical shoe to right foot  Weight bearing as tolerated    Diagnosis: Hypertension  Assessment and Plan of Treatment: Low salt diet  Activity as tolerated.  Take all medication as prescribed.  Follow up with your medical doctor for routine blood pressure monitoring at your next visit.  Notify your doctor if you have any of the following symptoms:   Dizziness, Lightheadedness, Blurry vision, Headache, Chest pain, Shortness of breath      Diagnosis: COPD (chronic obstructive pulmonary disease)  Assessment and Plan of Treatment: Call your Health Care provider upon arrival home to make a follow up appointment within one week.  Take all inhalers as prescribed by your Health Care Provider.  Take steroids as prescribed by your Health Care Provider.  If your cough increases infrequency and severity and/or you have shortness of breath or increased shortness of breath call your Health Care Provider.  If you develop fever, chills, night sweats, malaise, and/or change in mental status call your Health care Provider.  Nutrition is very important.  Eat small frequent meals.  Increase your activity as tolerated.  Do not stay in bed all day      Diagnosis: Diabetes  Assessment and Plan of Treatment: Make sure you get your HgA1c checked every three months.  If you take oral diabetes medications, check your blood glucose two times a day.  If you take insulin, check your blood glucose before meals and at bedtime.  It's important not to skip any meals.  Keep a log of your blood glucose results and always take it with you to your doctor appointments.  Keep a list of your current medications including injectables and over the counter medications and bring this medication list with you to all your doctor appointments.  If you have not seen your ophthalmologist this year call for appointment.  Check your feet daily for redness, sores, or openings. Do not self treat. If no improvement in two days call your primary care physician for an appointment.  Low blood sugar (hypoglycemia) is a blood sugar below 70mg/dl. Check your blood sugar if you feel signs/symptoms of hypoglycemia. If your blood sugar is below 70 take 15 grams of carbohydrates (ex 4 oz of apple juice, 3-4 glucose tablets, or 4-6 oz of regular soda) wait 15 minutes and repeat blood sugar to make sure it comes up above 70.  If your blood sugar is above 70 and you are due for a meal, have a meal.  If you are not due for a meal have a snack.  This snack helps keeps your blood sugar at a safe range.

## 2023-10-31 NOTE — DISCHARGE NOTE PROVIDER - HOSPITAL COURSE
HPI:  67 y/o F with PMHx of COPD (on home O2, 2L 24/7), HTN, HLD and PSHx of cholecystectomy 1 month ago, presents to ED for evaluation of bilateral foot and ankle pain following mechanical fall this morning. States she was walking down steps in her home, when she slipped on the 2nd step from the bottom, inverting both ankles. Has been unable to bear weight since. Recalls all details from the fall, did not hit her head and is not on anticoagulation. Denies recent illness, chest pain/dizziness prior to the fall or currently, pain in the knees/hips/back/neck or upper extremities. NKDA. Given Toradol by EMS PTA. (29 Oct 2023 18:39)    Hospital Course:  Admitted with fall, right foot 5th metatarsal base fx- podiatry followed, pain control, applied Escalante compression with surgical shoe to right foot; WBAT and with Left foot lisfranc injury- Escalante compression w/ posterior splint; NWB.  Pt had EKG changes.  Cardiology followed. Repeat EKG, NSR.  Acute issues resolved. Patient has been medically cleared for discharge as per Dr. Jacob to [facility].  Patient has been given appropriate discharge instructions including medication regimen, access site management and follow up. Medications that patient needs refills on (+/- new medications) have been e-prescribed to preferred pharmacy. Patient will f/u with Dr. Murphy, podiatry, in 1-2 weeks for further management.     Important Medication Changes and Reason:    Active or Pending Issues Requiring Follow-up:    Advanced Directives:   [ x] Full code  [ ] DNR  [ ] Hospice    Discharge Diagnoses:  Right foot 5th metatarsal base fx  Left foot lisfranc injury

## 2023-10-31 NOTE — DISCHARGE NOTE PROVIDER - PROVIDER TOKENS
FREE:[LAST:[Murphy],FIRST:[D],PHONE:[(   )    -],FAX:[(   )    -],ADDRESS:[podiatry]],PROVIDER:[TOKEN:[[id number]],FOLLOWUP:[2 weeks]]

## 2023-10-31 NOTE — DISCHARGE NOTE PROVIDER - NSDCFUADDINST_GEN_ALL_CORE_FT
Left foot lisfranc injury - Escalante compression with posterior splint - Non-weight bearing    Right metatarsal fracture - Escalante compression with surgical shoe - weight bearing as tolerated

## 2023-10-31 NOTE — DISCHARGE NOTE PROVIDER - NSDCMRMEDTOKEN_GEN_ALL_CORE_FT
Albuterol (Eqv-ProAir HFA) 90 mcg/inh inhalation aerosol: 2 puff(s) inhaled every 6 hours as needed for  shortness of breath and/or wheezing  clonazePAM 0.5 mg oral tablet: 1 tab(s) orally every 12 hours AM and PM  fenofibrate 160 mg oral tablet: 1 tab(s) orally once a day  fesoterodine 4 mg oral tablet, extended release: 1 tab(s) orally once a day  fluticasone 100 mcg/inh inhalation powder: 1 puff(s) inhaled once a day  metFORMIN 500 mg oral tablet: 1 tab(s) orally 2 times a day  mirtazapine 45 mg oral tablet: 1 tab(s) orally once a day (at bedtime)  Myrbetriq 25 mg oral tablet, extended release: 1 tab(s) orally once a day  omeprazole 40 mg oral delayed release capsule: 1 cap(s) orally once a day  oxyBUTYnin 5 mg oral tablet: 1 tab(s) orally 2 times a day  PARoxetine 30 mg oral tablet: 1 tab(s) orally once a day  pravastatin 20 mg oral tablet: 1 tab(s) orally once a day  QUEtiapine 100 mg oral tablet: 1 tab(s) orally once a day  Spiriva Respimat 1.25 mcg/inh inhalation aerosol: 2 puff(s) inhaled once a day  theophylline 400 mg/24 hours oral capsule, extended release: 1 cap(s) orally once a day  Trelegy Ellipta 200 mcg-62.5 mcg-25 mcg/inh inhalation powder: 1 puff(s) inhaled once a day   Albuterol (Eqv-ProAir HFA) 90 mcg/inh inhalation aerosol: 2 puff(s) inhaled every 6 hours as needed for  shortness of breath and/or wheezing  clonazePAM 0.5 mg oral tablet: 1 tab(s) orally every 12 hours AM and PM  fenofibrate 160 mg oral tablet: 1 tab(s) orally once a day  fesoterodine 4 mg oral tablet, extended release: 1 tab(s) orally once a day  fluticasone 100 mcg/inh inhalation powder: 1 puff(s) inhaled once a day  metFORMIN 500 mg oral tablet: 1 tab(s) orally 2 times a day  mirtazapine 45 mg oral tablet: 1 tab(s) orally once a day (at bedtime)  Myrbetriq 25 mg oral tablet, extended release: 1 tab(s) orally once a day  nicotine 4 mg oral transmucosal gum: 1 gum chewed every 8 hours as needed for smoking cessation  omeprazole 40 mg oral delayed release capsule: 1 cap(s) orally once a day  PARoxetine 30 mg oral tablet: 1 tab(s) orally once a day  polyethylene glycol 3350 oral powder for reconstitution: 17 gram(s) orally once a day  pravastatin 20 mg oral tablet: 1 tab(s) orally once a day  QUEtiapine 100 mg oral tablet: 1 tab(s) orally once a day  senna leaf extract oral tablet: 2 tab(s) orally once a day (at bedtime)  theophylline 400 mg/24 hours oral capsule, extended release: 1 cap(s) orally once a day  Trelegy Ellipta 200 mcg-62.5 mcg-25 mcg/inh inhalation powder: 1 puff(s) inhaled once a day   acetaminophen 325 mg oral tablet: 2 tab(s) orally every 6 hours as needed for  mild-moderate pain  Albuterol (Eqv-ProAir HFA) 90 mcg/inh inhalation aerosol: 2 puff(s) inhaled every 6 hours as needed for  shortness of breath and/or wheezing  clonazePAM 0.5 mg oral tablet: 1 tab(s) orally every 12 hours AM and PM  fenofibrate 160 mg oral tablet: 1 tab(s) orally once a day  fesoterodine 4 mg oral tablet, extended release: 1 tab(s) orally once a day  fluticasone 100 mcg/inh inhalation powder: 1 puff(s) inhaled once a day  metFORMIN 500 mg oral tablet: 1 tab(s) orally 2 times a day  mirtazapine 45 mg oral tablet: 1 tab(s) orally once a day (at bedtime)  Myrbetriq 25 mg oral tablet, extended release: 1 tab(s) orally once a day  nicotine 4 mg oral transmucosal gum: 1 gum chewed every 8 hours as needed for smoking cessation  omeprazole 40 mg oral delayed release capsule: 1 cap(s) orally once a day  oxycodone-acetaminophen 5 mg-325 mg oral tablet: 1 tab(s) orally every 6 hours as needed for Severe Pain (7 - 10)  PARoxetine 30 mg oral tablet: 1 tab(s) orally once a day  polyethylene glycol 3350 oral powder for reconstitution: 17 gram(s) orally once a day  pravastatin 20 mg oral tablet: 1 tab(s) orally once a day  QUEtiapine 100 mg oral tablet: 1 tab(s) orally once a day  senna leaf extract oral tablet: 2 tab(s) orally once a day (at bedtime)  theophylline 400 mg/24 hours oral capsule, extended release: 1 cap(s) orally once a day  Trelegy Ellipta 200 mcg-62.5 mcg-25 mcg/inh inhalation powder: 1 puff(s) inhaled once a day   acetaminophen 325 mg oral tablet: 2 tab(s) orally every 6 hours as needed for  mild-moderate pain  Albuterol (Eqv-ProAir HFA) 90 mcg/inh inhalation aerosol: 2 puff(s) inhaled every 6 hours as needed for  shortness of breath and/or wheezing  clonazePAM 0.5 mg oral tablet: 1 tab(s) orally every 12 hours AM and PM  fenofibrate 160 mg oral tablet: 1 tab(s) orally once a day  fesoterodine 4 mg oral tablet, extended release: 1 tab(s) orally once a day  metFORMIN 500 mg oral tablet: 1 tab(s) orally 2 times a day  mirtazapine 45 mg oral tablet: 1 tab(s) orally once a day (at bedtime)  Myrbetriq 25 mg oral tablet, extended release: 1 tab(s) orally once a day  nicotine 4 mg oral transmucosal gum: 1 gum chewed every 8 hours as needed for smoking cessation  omeprazole 40 mg oral delayed release capsule: 1 cap(s) orally once a day  oxycodone-acetaminophen 5 mg-325 mg oral tablet: 1 tab(s) orally every 6 hours as needed for Severe Pain (7 - 10)  PARoxetine 30 mg oral tablet: 1 tab(s) orally once a day  polyethylene glycol 3350 oral powder for reconstitution: 17 gram(s) orally once a day  pravastatin 20 mg oral tablet: 1 tab(s) orally once a day  QUEtiapine 100 mg oral tablet: 1 tab(s) orally once a day  senna leaf extract oral tablet: 2 tab(s) orally once a day (at bedtime)  theophylline 400 mg/24 hours oral capsule, extended release: 1 cap(s) orally once a day  Trelegy Ellipta 200 mcg-62.5 mcg-25 mcg/inh inhalation powder: 1 puff(s) inhaled once a day

## 2023-11-01 LAB
APPEARANCE UR: CLEAR — SIGNIFICANT CHANGE UP
APPEARANCE UR: CLEAR — SIGNIFICANT CHANGE UP
BACTERIA # UR AUTO: NEGATIVE /HPF — SIGNIFICANT CHANGE UP
BACTERIA # UR AUTO: NEGATIVE /HPF — SIGNIFICANT CHANGE UP
BILIRUB UR-MCNC: NEGATIVE — SIGNIFICANT CHANGE UP
BILIRUB UR-MCNC: NEGATIVE — SIGNIFICANT CHANGE UP
CAST: 0 /LPF — SIGNIFICANT CHANGE UP (ref 0–4)
CAST: 0 /LPF — SIGNIFICANT CHANGE UP (ref 0–4)
COLOR SPEC: YELLOW — SIGNIFICANT CHANGE UP
COLOR SPEC: YELLOW — SIGNIFICANT CHANGE UP
DIFF PNL FLD: NEGATIVE — SIGNIFICANT CHANGE UP
DIFF PNL FLD: NEGATIVE — SIGNIFICANT CHANGE UP
GLUCOSE BLDC GLUCOMTR-MCNC: 103 MG/DL — HIGH (ref 70–99)
GLUCOSE BLDC GLUCOMTR-MCNC: 103 MG/DL — HIGH (ref 70–99)
GLUCOSE BLDC GLUCOMTR-MCNC: 118 MG/DL — HIGH (ref 70–99)
GLUCOSE BLDC GLUCOMTR-MCNC: 118 MG/DL — HIGH (ref 70–99)
GLUCOSE BLDC GLUCOMTR-MCNC: 141 MG/DL — HIGH (ref 70–99)
GLUCOSE BLDC GLUCOMTR-MCNC: 141 MG/DL — HIGH (ref 70–99)
GLUCOSE BLDC GLUCOMTR-MCNC: 220 MG/DL — HIGH (ref 70–99)
GLUCOSE BLDC GLUCOMTR-MCNC: 220 MG/DL — HIGH (ref 70–99)
GLUCOSE UR QL: NEGATIVE MG/DL — SIGNIFICANT CHANGE UP
GLUCOSE UR QL: NEGATIVE MG/DL — SIGNIFICANT CHANGE UP
KETONES UR-MCNC: NEGATIVE MG/DL — SIGNIFICANT CHANGE UP
KETONES UR-MCNC: NEGATIVE MG/DL — SIGNIFICANT CHANGE UP
LEUKOCYTE ESTERASE UR-ACNC: NEGATIVE — SIGNIFICANT CHANGE UP
LEUKOCYTE ESTERASE UR-ACNC: NEGATIVE — SIGNIFICANT CHANGE UP
NITRITE UR-MCNC: NEGATIVE — SIGNIFICANT CHANGE UP
NITRITE UR-MCNC: NEGATIVE — SIGNIFICANT CHANGE UP
PH UR: 6 — SIGNIFICANT CHANGE UP (ref 5–8)
PH UR: 6 — SIGNIFICANT CHANGE UP (ref 5–8)
PROT UR-MCNC: NEGATIVE MG/DL — SIGNIFICANT CHANGE UP
PROT UR-MCNC: NEGATIVE MG/DL — SIGNIFICANT CHANGE UP
RBC CASTS # UR COMP ASSIST: 0 /HPF — SIGNIFICANT CHANGE UP (ref 0–4)
RBC CASTS # UR COMP ASSIST: 0 /HPF — SIGNIFICANT CHANGE UP (ref 0–4)
SP GR SPEC: 1.01 — SIGNIFICANT CHANGE UP (ref 1–1.03)
SP GR SPEC: 1.01 — SIGNIFICANT CHANGE UP (ref 1–1.03)
SQUAMOUS # UR AUTO: 0 /HPF — SIGNIFICANT CHANGE UP (ref 0–5)
SQUAMOUS # UR AUTO: 0 /HPF — SIGNIFICANT CHANGE UP (ref 0–5)
UROBILINOGEN FLD QL: 0.2 MG/DL — SIGNIFICANT CHANGE UP (ref 0.2–1)
UROBILINOGEN FLD QL: 0.2 MG/DL — SIGNIFICANT CHANGE UP (ref 0.2–1)
WBC UR QL: 2 /HPF — SIGNIFICANT CHANGE UP (ref 0–5)
WBC UR QL: 2 /HPF — SIGNIFICANT CHANGE UP (ref 0–5)

## 2023-11-01 RX ORDER — SENNA PLUS 8.6 MG/1
2 TABLET ORAL AT BEDTIME
Refills: 0 | Status: DISCONTINUED | OUTPATIENT
Start: 2023-11-01 | End: 2023-11-03

## 2023-11-01 RX ORDER — POLYETHYLENE GLYCOL 3350 17 G/17G
17 POWDER, FOR SOLUTION ORAL DAILY
Refills: 0 | Status: DISCONTINUED | OUTPATIENT
Start: 2023-11-01 | End: 2023-11-03

## 2023-11-01 RX ADMIN — Medication 2: at 13:14

## 2023-11-01 RX ADMIN — Medication 0.5 MILLIGRAM(S): at 21:16

## 2023-11-01 RX ADMIN — Medication 4 MILLIGRAM(S): at 05:30

## 2023-11-01 RX ADMIN — Medication 3 MILLILITER(S): at 18:27

## 2023-11-01 RX ADMIN — Medication 400 MILLIGRAM(S): at 11:27

## 2023-11-01 RX ADMIN — SENNA PLUS 2 TABLET(S): 8.6 TABLET ORAL at 23:08

## 2023-11-01 RX ADMIN — Medication 3 MILLILITER(S): at 23:08

## 2023-11-01 RX ADMIN — Medication 3 MILLILITER(S): at 05:30

## 2023-11-01 RX ADMIN — ENOXAPARIN SODIUM 40 MILLIGRAM(S): 100 INJECTION SUBCUTANEOUS at 05:29

## 2023-11-01 RX ADMIN — MIRTAZAPINE 45 MILLIGRAM(S): 45 TABLET, ORALLY DISINTEGRATING ORAL at 21:16

## 2023-11-01 RX ADMIN — QUETIAPINE FUMARATE 100 MILLIGRAM(S): 200 TABLET, FILM COATED ORAL at 21:16

## 2023-11-01 RX ADMIN — TIOTROPIUM BROMIDE 2 PUFF(S): 18 CAPSULE ORAL; RESPIRATORY (INHALATION) at 11:24

## 2023-11-01 RX ADMIN — Medication 145 MILLIGRAM(S): at 11:26

## 2023-11-01 RX ADMIN — Medication 10 MILLIGRAM(S): at 05:29

## 2023-11-01 RX ADMIN — Medication 0.5 MILLIGRAM(S): at 09:40

## 2023-11-01 RX ADMIN — Medication 30 MILLIGRAM(S): at 11:27

## 2023-11-01 RX ADMIN — Medication 3 MILLILITER(S): at 11:25

## 2023-11-01 RX ADMIN — PANTOPRAZOLE SODIUM 40 MILLIGRAM(S): 20 TABLET, DELAYED RELEASE ORAL at 05:31

## 2023-11-01 RX ADMIN — Medication 10 MILLIGRAM(S): at 18:27

## 2023-11-02 LAB
GLUCOSE BLDC GLUCOMTR-MCNC: 119 MG/DL — HIGH (ref 70–99)
GLUCOSE BLDC GLUCOMTR-MCNC: 119 MG/DL — HIGH (ref 70–99)
GLUCOSE BLDC GLUCOMTR-MCNC: 124 MG/DL — HIGH (ref 70–99)
GLUCOSE BLDC GLUCOMTR-MCNC: 124 MG/DL — HIGH (ref 70–99)
GLUCOSE BLDC GLUCOMTR-MCNC: 133 MG/DL — HIGH (ref 70–99)
GLUCOSE BLDC GLUCOMTR-MCNC: 133 MG/DL — HIGH (ref 70–99)
GLUCOSE BLDC GLUCOMTR-MCNC: 137 MG/DL — HIGH (ref 70–99)
GLUCOSE BLDC GLUCOMTR-MCNC: 137 MG/DL — HIGH (ref 70–99)

## 2023-11-02 RX ORDER — ONDANSETRON 8 MG/1
4 TABLET, FILM COATED ORAL ONCE
Refills: 0 | Status: COMPLETED | OUTPATIENT
Start: 2023-11-02 | End: 2023-11-02

## 2023-11-02 RX ADMIN — Medication 0: at 09:07

## 2023-11-02 RX ADMIN — Medication 0: at 18:08

## 2023-11-02 RX ADMIN — ONDANSETRON 4 MILLIGRAM(S): 8 TABLET, FILM COATED ORAL at 18:35

## 2023-11-02 RX ADMIN — TIOTROPIUM BROMIDE 2 PUFF(S): 18 CAPSULE ORAL; RESPIRATORY (INHALATION) at 11:42

## 2023-11-02 RX ADMIN — Medication 0.5 MILLIGRAM(S): at 21:13

## 2023-11-02 RX ADMIN — Medication 10 MILLIGRAM(S): at 19:55

## 2023-11-02 RX ADMIN — MIRTAZAPINE 45 MILLIGRAM(S): 45 TABLET, ORALLY DISINTEGRATING ORAL at 21:13

## 2023-11-02 RX ADMIN — POLYETHYLENE GLYCOL 3350 17 GRAM(S): 17 POWDER, FOR SOLUTION ORAL at 11:41

## 2023-11-02 RX ADMIN — Medication 0.5 MILLIGRAM(S): at 09:05

## 2023-11-02 RX ADMIN — Medication 400 MILLIGRAM(S): at 11:41

## 2023-11-02 RX ADMIN — Medication 4 MILLIGRAM(S): at 09:07

## 2023-11-02 RX ADMIN — QUETIAPINE FUMARATE 100 MILLIGRAM(S): 200 TABLET, FILM COATED ORAL at 21:13

## 2023-11-02 RX ADMIN — ENOXAPARIN SODIUM 40 MILLIGRAM(S): 100 INJECTION SUBCUTANEOUS at 05:07

## 2023-11-02 RX ADMIN — Medication 3 MILLILITER(S): at 05:07

## 2023-11-02 RX ADMIN — Medication 30 MILLIGRAM(S): at 11:41

## 2023-11-02 RX ADMIN — PANTOPRAZOLE SODIUM 40 MILLIGRAM(S): 20 TABLET, DELAYED RELEASE ORAL at 05:08

## 2023-11-02 RX ADMIN — Medication 10 MILLIGRAM(S): at 05:08

## 2023-11-02 RX ADMIN — Medication 3 MILLILITER(S): at 11:41

## 2023-11-02 RX ADMIN — Medication 0: at 13:08

## 2023-11-02 RX ADMIN — Medication 145 MILLIGRAM(S): at 11:41

## 2023-11-03 ENCOUNTER — TRANSCRIPTION ENCOUNTER (OUTPATIENT)
Age: 68
End: 2023-11-03

## 2023-11-03 VITALS
SYSTOLIC BLOOD PRESSURE: 124 MMHG | RESPIRATION RATE: 18 BRPM | OXYGEN SATURATION: 95 % | TEMPERATURE: 98 F | DIASTOLIC BLOOD PRESSURE: 63 MMHG | HEART RATE: 86 BPM

## 2023-11-03 LAB
GLUCOSE BLDC GLUCOMTR-MCNC: 113 MG/DL — HIGH (ref 70–99)
GLUCOSE BLDC GLUCOMTR-MCNC: 113 MG/DL — HIGH (ref 70–99)
GLUCOSE BLDC GLUCOMTR-MCNC: 159 MG/DL — HIGH (ref 70–99)
GLUCOSE BLDC GLUCOMTR-MCNC: 159 MG/DL — HIGH (ref 70–99)

## 2023-11-03 RX ORDER — QUETIAPINE FUMARATE 200 MG/1
1 TABLET, FILM COATED ORAL
Refills: 0 | DISCHARGE

## 2023-11-03 RX ORDER — POLYETHYLENE GLYCOL 3350 17 G/17G
17 POWDER, FOR SOLUTION ORAL
Qty: 0 | Refills: 0 | DISCHARGE
Start: 2023-11-03

## 2023-11-03 RX ORDER — ACETAMINOPHEN 500 MG
2 TABLET ORAL
Qty: 0 | Refills: 0 | DISCHARGE
Start: 2023-11-03

## 2023-11-03 RX ORDER — SENNA PLUS 8.6 MG/1
2 TABLET ORAL
Qty: 0 | Refills: 0 | DISCHARGE
Start: 2023-11-03

## 2023-11-03 RX ORDER — NICOTINE POLACRILEX 2 MG
1 GUM BUCCAL
Qty: 0 | Refills: 0 | DISCHARGE

## 2023-11-03 RX ORDER — OXYCODONE AND ACETAMINOPHEN 5; 325 MG/1; MG/1
1 TABLET ORAL
Qty: 0 | Refills: 0 | DISCHARGE
Start: 2023-11-03

## 2023-11-03 RX ADMIN — Medication 1: at 12:29

## 2023-11-03 RX ADMIN — Medication 4 MILLIGRAM(S): at 10:43

## 2023-11-03 RX ADMIN — Medication 0.5 MILLIGRAM(S): at 10:44

## 2023-11-03 RX ADMIN — Medication 30 MILLIGRAM(S): at 10:44

## 2023-11-03 RX ADMIN — Medication 10 MILLIGRAM(S): at 05:17

## 2023-11-03 RX ADMIN — Medication 145 MILLIGRAM(S): at 10:44

## 2023-11-03 RX ADMIN — ENOXAPARIN SODIUM 40 MILLIGRAM(S): 100 INJECTION SUBCUTANEOUS at 05:17

## 2023-11-03 RX ADMIN — PANTOPRAZOLE SODIUM 40 MILLIGRAM(S): 20 TABLET, DELAYED RELEASE ORAL at 05:17

## 2023-11-03 RX ADMIN — Medication 400 MILLIGRAM(S): at 10:44

## 2023-11-03 NOTE — PROGRESS NOTE ADULT - PROVIDER SPECIALTY LIST ADULT
Cardiology
Cardiology
Internal Medicine
Internal Medicine
Cardiology
Internal Medicine
Internal Medicine
Cardiology
Internal Medicine

## 2023-11-03 NOTE — PROGRESS NOTE ADULT - ASSESSMENT
A/p  67 y/o F with PMHx of COPD (on home O2, 2L 24/7), HTN, HLD and PSHx of cholecystectomy 1 month ago, presents to ED for evaluation of bilateral foot and ankle pain following mechanical fall this morning.    #Fall  -Mechanical as per patient report  -No cardiac prodromes prior to fall, no LOC  -HST negative x2  -EKG with apparent changes (new TWI V1-V5)  -Repeat ekg without acute ischemic abnormalities  -Per patient no recent cp, sob, zhong, dizziness or syncope  -Recent echo 8/2023 with nml lv fxn    #Right foot fifth metatarsal base fracture  #Left foot lisfranc injury  -As noted on XR  -Podiatry following  -No surgical intervention per pod    #HTN  -BP stable off bp meds      dvt ppx
68 f with    Feet fractures  - pain control  - Podiatry evaluation noted  - PT    EKG changes  - Echo recent with nl LVF  - cardiology evaluation dr Begum  noted    COPD  - nebs  - supplement O2    Diabetes Mellitus  - BS control  - ADA diet     HTN  - control    Anxiety  - control    Smoking  - cessation advised  - Nicotine gum     DVT prophylaxis    DCP rehab.     d/w patient QA    Landon Jacob MD phone [phone] 
68 f with    Feet fractures  - pain control  - Podiatry evaluation noted  - PT    EKG changes  - Echo recent with nl LVF  - cardiology evaluation dr Begum  noted. No further intervention.     COPD  - nebs  - supplement O2    Diabetes Mellitus  - BS control  - ADA diet     HTN  - control    Anxiety  - control    Smoking  - cessation advised  - Nicotine gum     DVT prophylaxis    DC rehab.    Landon Jacob MD phone [phone] 
  A/p  67 y/o F with PMHx of COPD (on home O2, 2L 24/7), HTN, HLD and PSHx of cholecystectomy 1 month ago, presents to ED for evaluation of bilateral foot and ankle pain following mechanical fall this morning.    #Fall  -Mechanical as per patient report  -No cardiac prodromes prior to fall, no LOC  -HST negative x2  -EKG with apparent changes (new TWI V1-V5)  -Repeat ekg without acute ischemic abnormalities  -Per patient no recent cp, sob, zhong, dizziness or syncope  -Recent echo 8/2023 with nml lv fxn    #Right foot fifth metatarsal base fracture  #Left foot lisfranc injury  -As noted on XR  -Podiatry following  -No surgical intervention per pod    #HTN  -BP stable off bp meds      dvt ppx
  A/p  67 y/o F with PMHx of COPD (on home O2, 2L 24/7), HTN, HLD and PSHx of cholecystectomy 1 month ago, presents to ED for evaluation of bilateral foot and ankle pain following mechanical fall this morning.    #Fall  -Mechanical as per patient report  -No cardiac prodromes prior to fall, no LOC  -HST negative x2  -EKG with apparent changes (new TWI V1-V5)  -Repeat ekg without acute ischemic abnormalities  -Per patient no recent cp, sob, zhong, dizziness or syncope  -Recent echo 8/2023 with nml lv fxn    #Right foot fifth metatarsal base fracture  #Left foot lisfranc injury  -As noted on XR  -Podiatry following  -No surgical intervention per pod    #HTN  -BP stable off bp meds      dvt ppx
  A/p  67 y/o F with PMHx of COPD (on home O2, 2L 24/7), HTN, HLD and PSHx of cholecystectomy 1 month ago, presents to ED for evaluation of bilateral foot and ankle pain following mechanical fall this morning.    #Fall  -Mechanical as per patient report  -No cardiac prodromes prior to fall, no LOC  -HST negative x2  -EKG with apparent changes (new TWI V1-V5)  -Repeat ekg without acute ischemic abnormalities  -Per patient no recent cp, sob, zhong, dizziness or syncope  -Recent echo 8/2023 with nml lv fxn    #Right foot fifth metatarsal base fracture  #Left foot lisfranc injury  -As noted on XR  -Podiatry following  -No surgical intervention per pod    #HTN  -BP stable off bp meds  DCP  dvt ppx
68 f with    Feet fractures  - pain control  - Podiatry evaluation noted  - PT    EKG changes  - Echo  - cardiology evaluation dr Begum     COPD  - nebs  - supplement O2    Diabetes Mellitus  - BS control  - ADA diet     HTN  - control    Anxiety  - control    Smoking  - cessation advised  - Nicotine gum     DVT prophylaxis    Landon Jacob MD phone [phone] 
68 f with    Feet fractures  - pain control  - Podiatry evaluation noted  - PT    EKG changes  - Echo recent with nl LVF  - cardiology evaluation dr Begum  noted. No further intervention.     COPD  - nebs  - supplement O2    Diabetes Mellitus  - BS control  - ADA diet     HTN  - control    Anxiety  - control    Smoking  - cessation advised  - Nicotine gum     DVT prophylaxis    DCP rehab in progress.     d/w patient QA    Landon Jacob MD phone [phone] 
68 f with    Feet fractures  - pain control  - Podiatry evaluation noted  - PT    EKG changes  - Echo recent with nl LVF  - cardiology evaluation dr Begum  noted. No further intervention.     COPD  - nebs  - supplement O2    Diabetes Mellitus  - BS control  - ADA diet     HTN  - control    Anxiety  - control    Smoking  - cessation advised  - Nicotine gum     DVT prophylaxis    DCP rehab in progress.     d/w patient QA    Landon Jacob MD phone [phone]

## 2023-11-03 NOTE — PHARMACOTHERAPY INTERVENTION NOTE - COMMENTS
Medication Reconciliation completed for patient.  These were confirmed with patient, patient's pharmacy, and patients recent discharge papers.  Patient reiterated she is on 2 medications for her bladder.  Updated medications are reflected in Outpatient Medication Review.     Home Medications:  clonazePAM 0.5 mg oral tablet: 1 tab(s) orally every 12 hours AM and PM  fenofibrate 160 mg oral tablet: 1 tab(s) orally once a day  fesoterodine 4 mg oral tablet, extended release: 1 tab(s) orally once a day  metFORMIN 500 mg oral tablet: 1 tab(s) orally 2 times a day   mirtazapine 45 mg oral tablet: 1 tab(s) orally once a day (at bedtime)   Myrbetriq 25 mg oral tablet, extended release: 1 tab(s) orally once a day   omeprazole 40 mg oral delayed release capsule: 1 cap(s) orally once a day  PARoxetine 30 mg oral tablet: 1 tab(s) orally once a day   polyethylene glycol 3350 oral powder for reconstitution: 17 gram(s) orally once a day   pravastatin 20 mg oral tablet: 1 tab(s) orally once a day   QUEtiapine 100 mg oral tablet: 1 tab(s) orally once a day   senna leaf extract oral tablet: 2 tab(s) orally once a day (at bedtime)   theophylline 400 mg/24 hours oral capsule, extended release: 1 cap(s) orally once a day   Trelegy Ellipta 200 mcg-62.5 mcg-25 mcg/inh inhalation powder: 1 puff(s) inhaled once a day     Rena James, PharmD, BCPS  Clinical Pharmacy Specialist  Available on Teams

## 2023-11-03 NOTE — DISCHARGE NOTE NURSING/CASE MANAGEMENT/SOCIAL WORK - NSDCFUADDAPPT_GEN_ALL_CORE_FT
Make appointments to follow up with your out patient physicians.  Bring all discharge paperwork including discharge medication list to your follow up appointments.   Podiatry follow up outpatient with Dr. Murphy [phone] within 1 week of discharge

## 2023-11-03 NOTE — PROGRESS NOTE ADULT - TIME BILLING
agree with above  CV stable  cont current mgmt
Agree with above NP note.  cv stable  cont current tx  dcp
Agree with above PA note.  cv stable  cont current tx
agree with above  CV stable  cont current mgmt

## 2023-11-03 NOTE — DISCHARGE NOTE NURSING/CASE MANAGEMENT/SOCIAL WORK - NSPROEXTENSIONSOFSELF_GEN_A_NUR
Pulse oximeter placed on patient's left index finger and left index finger. crutches/dentures/eyeglasses

## 2023-11-03 NOTE — CHART NOTE - NSCHARTNOTEFT_GEN_A_CORE
Request from Dr. Jacob to facilitate patient discharge.  Medication reconciliation reviewed, revised, and resolved with Dr. Jacob, who has medically cleared patient for discharge with follow up as advised.  Please refer to discharge note for detailed hospital course.

## 2023-11-03 NOTE — DISCHARGE NOTE NURSING/CASE MANAGEMENT/SOCIAL WORK - PATIENT PORTAL LINK FT
You can access the FollowMyHealth Patient Portal offered by [facility] by registering at the following website: [email or web address]. By joining [facility]’s FollowMyHealth portal, you will also be able to view your health information using other applications (apps) compatible with our system.

## 2023-11-03 NOTE — PROGRESS NOTE ADULT - SUBJECTIVE AND OBJECTIVE BOX
CARDIOLOGY FOLLOW UP - Dr. Begum  DATE OF SERVICE: 11/1/23    CC  No CV complaints    REVIEW OF SYSTEMS:  CONSTITUTIONAL: No fever, weight loss, or fatigue  RESPIRATORY: No cough, wheezing, chills or hemoptysis; No Shortness of Breath  CARDIOVASCULAR: No chest pain, palpitations, passing out, dizziness, or leg swelling  GASTROINTESTINAL: No abdominal or epigastric pain. No nausea, vomiting, or hematemesis; No diarrhea or constipation. No melena or hematochezia.  VASCULAR: No edema     PHYSICAL EXAM:  T(C): 37.1 (11-01-23 @ 04:48), Max: 37.1 (11-01-23 @ 04:48)  HR: 85 (11-01-23 @ 04:48) (85 - 94)  BP: 121/63 (11-01-23 @ 04:48) (121/63 - 145/73)  RR: 18 (11-01-23 @ 04:48) (18 - 18)  SpO2: 91% (11-01-23 @ 04:48) (88% - 93%)  Wt(kg): --  I&O's Summary    31 Oct 2023 07:01  -  01 Nov 2023 07:00  --------------------------------------------------------  IN: 540 mL / OUT: 400 mL / NET: 140 mL    01 Nov 2023 07:01  -  01 Nov 2023 12:09  --------------------------------------------------------  IN: 240 mL / OUT: 0 mL / NET: 240 mL        Appearance: Normal	  Cardiovascular: Normal S1 S2,RRR, No JVD, No murmurs  Respiratory: Lungs clear to auscultation b/l   Gastrointestinal:  Soft, Non-tender, + BS	  Extremities: Normal range of motion, No clubbing, cyanosis or edema. +B/l foot wrapped      Home Medications:  clonazePAM 0.5 mg oral tablet: 1 tab(s) orally every 12 hours AM and PM (29 Oct 2023 18:34)  fenofibrate 160 mg oral tablet: 1 tab(s) orally once a day (29 Oct 2023 18:34)  fesoterodine 4 mg oral tablet, extended release: 1 tab(s) orally once a day (29 Oct 2023 18:39)  metFORMIN 500 mg oral tablet: 1 tab(s) orally 2 times a day (29 Oct 2023 18:34)  mirtazapine 45 mg oral tablet: 1 tab(s) orally once a day (at bedtime) (29 Oct 2023 18:34)  Myrbetriq 25 mg oral tablet, extended release: 1 tab(s) orally once a day (29 Oct 2023 18:34)  omeprazole 40 mg oral delayed release capsule: 1 cap(s) orally once a day (29 Oct 2023 18:34)  oxyBUTYnin 5 mg oral tablet: 1 tab(s) orally 2 times a day (29 Oct 2023 18:34)  PARoxetine 30 mg oral tablet: 1 tab(s) orally once a day (29 Oct 2023 18:34)  pravastatin 20 mg oral tablet: 1 tab(s) orally once a day (29 Oct 2023 18:34)  QUEtiapine 100 mg oral tablet: 1 tab(s) orally once a day (29 Oct 2023 18:39)  theophylline 400 mg/24 hours oral capsule, extended release: 1 cap(s) orally once a day (29 Oct 2023 18:39)  Trelegy Ellipta 200 mcg-62.5 mcg-25 mcg/inh inhalation powder: 1 puff(s) inhaled once a day (29 Oct 2023 18:39)      MEDICATIONS  (STANDING):  albuterol/ipratropium for Nebulization 3 milliLiter(s) Nebulizer every 6 hours  clonazePAM  Tablet 0.5 milliGRAM(s) Oral every 12 hours  dextrose 5%. 1000 milliLiter(s) (50 mL/Hr) IV Continuous <Continuous>  dextrose 5%. 1000 milliLiter(s) (100 mL/Hr) IV Continuous <Continuous>  dextrose 50% Injectable 12.5 Gram(s) IV Push once  dextrose 50% Injectable 25 Gram(s) IV Push once  dextrose 50% Injectable 25 Gram(s) IV Push once  enoxaparin Injectable 40 milliGRAM(s) SubCutaneous every 24 hours  fenofibrate Tablet 145 milliGRAM(s) Oral daily  glucagon  Injectable 1 milliGRAM(s) IntraMuscular once  insulin lispro (ADMELOG) corrective regimen sliding scale   SubCutaneous three times a day before meals  insulin lispro (ADMELOG) corrective regimen sliding scale   SubCutaneous at bedtime  mirtazapine 45 milliGRAM(s) Oral at bedtime  oxybutynin 10 milliGRAM(s) Oral two times a day  pantoprazole    Tablet 40 milliGRAM(s) Oral before breakfast  PARoxetine 30 milliGRAM(s) Oral daily  QUEtiapine 100 milliGRAM(s) Oral daily  theophylline ER (24 Hour) 400 milliGRAM(s) Oral daily  tiotropium 2.5 MICROgram(s) Inhaler 2 Puff(s) Inhalation daily      TELEMETRY: 	    ECG:  	  RADIOLOGY:   DIAGNOSTIC TESTING:  [ ] Echocardiogram:  [ ]  Catheterization:  [ ] Stress Test:    OTHER: 	    LABS:	 	                      
Patient is a 68y old  Female who presents with a chief complaint of     SUBJECTIVE / OVERNIGHT EVENTS: Comfortable without new complaints.   Review of Systems  chest pain no  palpitations no  sob no  nausea no  headache no    MEDICATIONS  (STANDING):  albuterol/ipratropium for Nebulization 3 milliLiter(s) Nebulizer every 6 hours  clonazePAM  Tablet 0.5 milliGRAM(s) Oral every 12 hours  dextrose 5%. 1000 milliLiter(s) (50 mL/Hr) IV Continuous <Continuous>  dextrose 5%. 1000 milliLiter(s) (100 mL/Hr) IV Continuous <Continuous>  dextrose 50% Injectable 25 Gram(s) IV Push once  dextrose 50% Injectable 12.5 Gram(s) IV Push once  dextrose 50% Injectable 25 Gram(s) IV Push once  enoxaparin Injectable 40 milliGRAM(s) SubCutaneous every 24 hours  fenofibrate Tablet 145 milliGRAM(s) Oral daily  glucagon  Injectable 1 milliGRAM(s) IntraMuscular once  insulin lispro (ADMELOG) corrective regimen sliding scale   SubCutaneous three times a day before meals  insulin lispro (ADMELOG) corrective regimen sliding scale   SubCutaneous at bedtime  mirtazapine 45 milliGRAM(s) Oral at bedtime  oxybutynin 10 milliGRAM(s) Oral two times a day  pantoprazole    Tablet 40 milliGRAM(s) Oral before breakfast  PARoxetine 30 milliGRAM(s) Oral daily  QUEtiapine 100 milliGRAM(s) Oral daily  theophylline ER (24 Hour) 400 milliGRAM(s) Oral daily  tiotropium 2.5 MICROgram(s) Inhaler 2 Puff(s) Inhalation daily    MEDICATIONS  (PRN):  acetaminophen     Tablet .. 650 milliGRAM(s) Oral every 6 hours PRN Temp greater or equal to 38.5C (101.3F), Mild Pain (1 - 3)  dextrose Oral Gel 15 Gram(s) Oral once PRN Blood Glucose LESS THAN 70 milliGRAM(s)/deciliter  nicotine  Polacrilex Gum 4 milliGRAM(s) Oral three times a day PRN smoking cessation  oxycodone    5 mG/acetaminophen 325 mG 2 Tablet(s) Oral every 6 hours PRN Severe Pain (7 - 10)  oxycodone    5 mG/acetaminophen 325 mG 1 Tablet(s) Oral every 6 hours PRN Moderate Pain (4 - 6)      Vital Signs Last 24 Hrs  T(C): 36.8 (2023 20:42), Max: 37.1 (2023 04:48)  T(F): 98.3 (2023 20:42), Max: 98.7 (2023 04:48)  HR: 97 (2023 20:42) (85 - 97)  BP: 127/68 (2023 20:42) (121/63 - 127/68)  BP(mean): --  RR: 18 (2023 20:42) (18 - 18)  SpO2: 93% (2023 20:42) (91% - 97%)    Parameters below as of 2023 20:42  Patient On (Oxygen Delivery Method): nasal cannula  O2 Flow (L/min): 3      PHYSICAL EXAM:  GENERAL: NAD, well-developed  HEAD:  Atraumatic, Normocephalic  EYES: EOMI, PERRLA, conjunctiva and sclera clear  NECK: Supple, No JVD  CHEST/LUNG: Clear to auscultation bilaterally; No wheeze  HEART: Regular rate and rhythm; No murmurs, rubs, or gallops  ABDOMEN: Soft, Nontender, Nondistended; Bowel sounds present  EXTREMITIES:  2+ Peripheral Pulses, No clubbing, cyanosis, or edema Feet with soft cast.   PSYCH: AAOx3  NEUROLOGY: non-focal  SKIN: No rashes or lesions    LABS:                Urinalysis Basic - ( 2023 19:36 )    Color: Yellow / Appearance: Clear / S.015 / pH: x  Gluc: x / Ketone: Negative mg/dL  / Bili: Negative / Urobili: 0.2 mg/dL   Blood: x / Protein: Negative mg/dL / Nitrite: Negative   Leuk Esterase: Negative / RBC: 0 /HPF / WBC 2 /HPF   Sq Epi: x / Non Sq Epi: 0 /HPF / Bacteria: Negative /HPF          RADIOLOGY & ADDITIONAL TESTS:    Imaging Personally Reviewed:    Consultant(s) Notes Reviewed:      Care Discussed with Consultants/Other Providers:  
CARDIOLOGY FOLLOW UP - Dr. Begum  DATE OF SERVICE: 10/31/23    CC  No CV complaints    REVIEW OF SYSTEMS:  CONSTITUTIONAL: No fever, weight loss, or fatigue  RESPIRATORY: No cough, wheezing, chills or hemoptysis; No Shortness of Breath  CARDIOVASCULAR: No chest pain, palpitations, passing out, dizziness, or leg swelling  GASTROINTESTINAL: No abdominal or epigastric pain. No nausea, vomiting, or hematemesis; No diarrhea or constipation. No melena or hematochezia.  VASCULAR: No edema     PHYSICAL EXAM:  T(C): 36.7 (10-31-23 @ 05:24), Max: 36.7 (10-31-23 @ 05:24)  HR: 71 (10-31-23 @ 05:24) (69 - 80)  BP: 112/67 (10-31-23 @ 05:24) (112/67 - 116/68)  RR: 19 (10-31-23 @ 05:24) (19 - 20)  SpO2: 95% (10-31-23 @ 05:24) (93% - 95%)  Wt(kg): --  I&O's Summary    30 Oct 2023 07:01  -  31 Oct 2023 07:00  --------------------------------------------------------  IN: 660 mL / OUT: 800 mL / NET: -140 mL        Appearance: Normal	  Cardiovascular: Normal S1 S2,RRR, No JVD, No murmurs  Respiratory: Lungs clear to auscultation	  Gastrointestinal:  Soft, Non-tender, + BS	  Extremities: Normal range of motion, No clubbing, cyanosis . +B/l feet wrapped      Home Medications:  clonazePAM 0.5 mg oral tablet: 1 tab(s) orally every 12 hours AM and PM (29 Oct 2023 18:34)  fenofibrate 160 mg oral tablet: 1 tab(s) orally once a day (29 Oct 2023 18:34)  fesoterodine 4 mg oral tablet, extended release: 1 tab(s) orally once a day (29 Oct 2023 18:39)  metFORMIN 500 mg oral tablet: 1 tab(s) orally 2 times a day (29 Oct 2023 18:34)  mirtazapine 45 mg oral tablet: 1 tab(s) orally once a day (at bedtime) (29 Oct 2023 18:34)  Myrbetriq 25 mg oral tablet, extended release: 1 tab(s) orally once a day (29 Oct 2023 18:34)  omeprazole 40 mg oral delayed release capsule: 1 cap(s) orally once a day (29 Oct 2023 18:34)  oxyBUTYnin 5 mg oral tablet: 1 tab(s) orally 2 times a day (29 Oct 2023 18:34)  PARoxetine 30 mg oral tablet: 1 tab(s) orally once a day (29 Oct 2023 18:34)  pravastatin 20 mg oral tablet: 1 tab(s) orally once a day (29 Oct 2023 18:34)  QUEtiapine 100 mg oral tablet: 1 tab(s) orally once a day (29 Oct 2023 18:39)  theophylline 400 mg/24 hours oral capsule, extended release: 1 cap(s) orally once a day (29 Oct 2023 18:39)  Trelegy Ellipta 200 mcg-62.5 mcg-25 mcg/inh inhalation powder: 1 puff(s) inhaled once a day (29 Oct 2023 18:39)      MEDICATIONS  (STANDING):  albuterol/ipratropium for Nebulization 3 milliLiter(s) Nebulizer every 6 hours  clonazePAM  Tablet 0.5 milliGRAM(s) Oral every 12 hours  dextrose 5%. 1000 milliLiter(s) (100 mL/Hr) IV Continuous <Continuous>  dextrose 5%. 1000 milliLiter(s) (50 mL/Hr) IV Continuous <Continuous>  dextrose 50% Injectable 12.5 Gram(s) IV Push once  dextrose 50% Injectable 25 Gram(s) IV Push once  dextrose 50% Injectable 25 Gram(s) IV Push once  enoxaparin Injectable 40 milliGRAM(s) SubCutaneous every 24 hours  fenofibrate Tablet 145 milliGRAM(s) Oral daily  glucagon  Injectable 1 milliGRAM(s) IntraMuscular once  insulin lispro (ADMELOG) corrective regimen sliding scale   SubCutaneous at bedtime  insulin lispro (ADMELOG) corrective regimen sliding scale   SubCutaneous three times a day before meals  mirtazapine 45 milliGRAM(s) Oral at bedtime  oxybutynin 10 milliGRAM(s) Oral two times a day  pantoprazole    Tablet 40 milliGRAM(s) Oral before breakfast  PARoxetine 30 milliGRAM(s) Oral daily  QUEtiapine 100 milliGRAM(s) Oral daily  theophylline ER (24 Hour) 400 milliGRAM(s) Oral daily  tiotropium 2.5 MICROgram(s) Inhaler 2 Puff(s) Inhalation daily      TELEMETRY: 	    ECG:  	  RADIOLOGY:   DIAGNOSTIC TESTING:  [ ] Echocardiogram:  [ ]  Catheterization:  [ ] Stress Test:    OTHER: 	    LABS:	 	                            13.5   8.04  )-----------( 431      ( 29 Oct 2023 12:57 )             41.8     10-29    138  |  102  |  23  ----------------------------<  110<H>  4.3   |  26  |  0.63    Ca    9.8      29 Oct 2023 12:57    TPro  7.2  /  Alb  4.4  /  TBili  0.2  /  DBili  x   /  AST  15  /  ALT  10  /  AlkPhos  38<L>  10-29            
CARDIOLOGY FOLLOW UP - Dr. Begum  DATE OF SERVICE: 11/2/23    CC  No CV complaints    REVIEW OF SYSTEMS:  CONSTITUTIONAL: No fever, weight loss, or fatigue  RESPIRATORY: No cough, wheezing, chills or hemoptysis; No Shortness of Breath  CARDIOVASCULAR: No chest pain, palpitations, passing out, dizziness, or leg swelling  GASTROINTESTINAL: No abdominal or epigastric pain. No nausea, vomiting, or hematemesis; No diarrhea or constipation. No melena or hematochezia.  VASCULAR: No edema     PHYSICAL EXAM:  T(C): 36.9 (11-02-23 @ 05:28), Max: 36.9 (11-02-23 @ 05:28)  HR: 78 (11-02-23 @ 05:28) (78 - 97)  BP: 123/69 (11-02-23 @ 05:28) (123/69 - 127/68)  RR: 18 (11-02-23 @ 05:28) (18 - 18)  SpO2: 91% (11-02-23 @ 05:28) (91% - 97%)  Wt(kg): --  I&O's Summary    01 Nov 2023 07:01  -  02 Nov 2023 07:00  --------------------------------------------------------  IN: 540 mL / OUT: 0 mL / NET: 540 mL        Appearance: Normal	  Cardiovascular: Normal S1 S2,RRR, No JVD, No murmurs  Respiratory: Lungs clear to auscultation b/l   Gastrointestinal:  Soft, Non-tender, + BS	  Extremities: Normal range of motion, No clubbing, cyanosis or edema. +B/l feet wrapped       Home Medications:  clonazePAM 0.5 mg oral tablet: 1 tab(s) orally every 12 hours AM and PM (29 Oct 2023 18:34)  fenofibrate 160 mg oral tablet: 1 tab(s) orally once a day (29 Oct 2023 18:34)  fesoterodine 4 mg oral tablet, extended release: 1 tab(s) orally once a day (29 Oct 2023 18:39)  metFORMIN 500 mg oral tablet: 1 tab(s) orally 2 times a day (29 Oct 2023 18:34)  mirtazapine 45 mg oral tablet: 1 tab(s) orally once a day (at bedtime) (29 Oct 2023 18:34)  Myrbetriq 25 mg oral tablet, extended release: 1 tab(s) orally once a day (29 Oct 2023 18:34)  omeprazole 40 mg oral delayed release capsule: 1 cap(s) orally once a day (29 Oct 2023 18:34)  oxyBUTYnin 5 mg oral tablet: 1 tab(s) orally 2 times a day (29 Oct 2023 18:34)  PARoxetine 30 mg oral tablet: 1 tab(s) orally once a day (29 Oct 2023 18:34)  pravastatin 20 mg oral tablet: 1 tab(s) orally once a day (29 Oct 2023 18:34)  QUEtiapine 100 mg oral tablet: 1 tab(s) orally once a day (29 Oct 2023 18:39)  theophylline 400 mg/24 hours oral capsule, extended release: 1 cap(s) orally once a day (29 Oct 2023 18:39)  Trelegy Ellipta 200 mcg-62.5 mcg-25 mcg/inh inhalation powder: 1 puff(s) inhaled once a day (29 Oct 2023 18:39)      MEDICATIONS  (STANDING):  albuterol/ipratropium for Nebulization 3 milliLiter(s) Nebulizer every 6 hours  clonazePAM  Tablet 0.5 milliGRAM(s) Oral every 12 hours  dextrose 5%. 1000 milliLiter(s) (50 mL/Hr) IV Continuous <Continuous>  dextrose 5%. 1000 milliLiter(s) (100 mL/Hr) IV Continuous <Continuous>  dextrose 50% Injectable 12.5 Gram(s) IV Push once  dextrose 50% Injectable 25 Gram(s) IV Push once  dextrose 50% Injectable 25 Gram(s) IV Push once  enoxaparin Injectable 40 milliGRAM(s) SubCutaneous every 24 hours  fenofibrate Tablet 145 milliGRAM(s) Oral daily  glucagon  Injectable 1 milliGRAM(s) IntraMuscular once  insulin lispro (ADMELOG) corrective regimen sliding scale   SubCutaneous three times a day before meals  insulin lispro (ADMELOG) corrective regimen sliding scale   SubCutaneous at bedtime  mirtazapine 45 milliGRAM(s) Oral at bedtime  oxybutynin 10 milliGRAM(s) Oral two times a day  pantoprazole    Tablet 40 milliGRAM(s) Oral before breakfast  PARoxetine 30 milliGRAM(s) Oral daily  polyethylene glycol 3350 17 Gram(s) Oral daily  QUEtiapine 100 milliGRAM(s) Oral daily  senna 2 Tablet(s) Oral at bedtime  theophylline ER (24 Hour) 400 milliGRAM(s) Oral daily  tiotropium 2.5 MICROgram(s) Inhaler 2 Puff(s) Inhalation daily      TELEMETRY: 	    ECG:  	  RADIOLOGY:   DIAGNOSTIC TESTING:  [ ] Echocardiogram:  [ ]  Catheterization:  [ ] Stress Test:    OTHER: 	    LABS:	 	                      
CARDIOLOGY FOLLOW UP - Dr. Begum  DATE OF SERVICE: 11/3/23    CC no cp or sob       REVIEW OF SYSTEMS:  CONSTITUTIONAL: No fever, weight loss, or fatigue  RESPIRATORY: No cough, wheezing, chills or hemoptysis; No Shortness of Breath  CARDIOVASCULAR: No chest pain, palpitations, passing out, dizziness, or leg swelling  GASTROINTESTINAL: No abdominal or epigastric pain. No nausea, vomiting, or hematemesis; No diarrhea or constipation. No melena or hematochezia.  VASCULAR: No edema     PHYSICAL EXAM:  T(C): 36.8 (11-03-23 @ 04:45), Max: 36.8 (11-03-23 @ 04:45)  HR: 87 (11-03-23 @ 04:45) (86 - 88)  BP: 123/67 (11-03-23 @ 04:45) (120/80 - 136/75)  RR: 18 (11-03-23 @ 04:45) (18 - 18)  SpO2: 92% (11-03-23 @ 04:45) (90% - 94%)  Wt(kg): --  I&O's Summary    02 Nov 2023 07:01  -  03 Nov 2023 07:00  --------------------------------------------------------  IN: 120 mL / OUT: 0 mL / NET: 120 mL        Appearance: Normal	  Cardiovascular: Normal S1 S2,RRR, No JVD, No murmurs  Respiratory: Lungs clear to auscultation	  Gastrointestinal:  Soft, Non-tender, + BS	  Extremities: Normal range of motion, No clubbing, cyanosis or edema      Home Medications:  acetaminophen 325 mg oral tablet: 2 tab(s) orally every 6 hours as needed for  mild-moderate pain (03 Nov 2023 13:26)  clonazePAM 0.5 mg oral tablet: 1 tab(s) orally every 12 hours AM and PM (29 Oct 2023 18:34)  fenofibrate 160 mg oral tablet: 1 tab(s) orally once a day (29 Oct 2023 18:34)  fesoterodine 4 mg oral tablet, extended release: 1 tab(s) orally once a day (29 Oct 2023 18:39)  metFORMIN 500 mg oral tablet: 1 tab(s) orally 2 times a day (29 Oct 2023 18:34)  mirtazapine 45 mg oral tablet: 1 tab(s) orally once a day (at bedtime) (29 Oct 2023 18:34)  Myrbetriq 25 mg oral tablet, extended release: 1 tab(s) orally once a day (29 Oct 2023 18:34)  nicotine 4 mg oral transmucosal gum: 1 gum chewed every 8 hours as needed for smoking cessation (03 Nov 2023 12:21)  omeprazole 40 mg oral delayed release capsule: 1 cap(s) orally once a day (29 Oct 2023 18:34)  oxycodone-acetaminophen 5 mg-325 mg oral tablet: 1 tab(s) orally every 6 hours as needed for Severe Pain (7 - 10) (03 Nov 2023 13:16)  PARoxetine 30 mg oral tablet: 1 tab(s) orally once a day (29 Oct 2023 18:34)  polyethylene glycol 3350 oral powder for reconstitution: 17 gram(s) orally once a day (03 Nov 2023 12:02)  pravastatin 20 mg oral tablet: 1 tab(s) orally once a day (29 Oct 2023 18:34)  QUEtiapine 100 mg oral tablet: 1 tab(s) orally once a day (03 Nov 2023 12:02)  senna leaf extract oral tablet: 2 tab(s) orally once a day (at bedtime) (03 Nov 2023 12:02)  theophylline 400 mg/24 hours oral capsule, extended release: 1 cap(s) orally once a day (29 Oct 2023 18:39)  Trelegy Ellipta 200 mcg-62.5 mcg-25 mcg/inh inhalation powder: 1 puff(s) inhaled once a day (29 Oct 2023 18:39)      MEDICATIONS  (STANDING):  albuterol/ipratropium for Nebulization 3 milliLiter(s) Nebulizer every 6 hours  clonazePAM  Tablet 0.5 milliGRAM(s) Oral every 12 hours  dextrose 5%. 1000 milliLiter(s) (50 mL/Hr) IV Continuous <Continuous>  dextrose 5%. 1000 milliLiter(s) (100 mL/Hr) IV Continuous <Continuous>  dextrose 50% Injectable 25 Gram(s) IV Push once  dextrose 50% Injectable 12.5 Gram(s) IV Push once  dextrose 50% Injectable 25 Gram(s) IV Push once  enoxaparin Injectable 40 milliGRAM(s) SubCutaneous every 24 hours  fenofibrate Tablet 145 milliGRAM(s) Oral daily  glucagon  Injectable 1 milliGRAM(s) IntraMuscular once  insulin lispro (ADMELOG) corrective regimen sliding scale   SubCutaneous three times a day before meals  insulin lispro (ADMELOG) corrective regimen sliding scale   SubCutaneous at bedtime  mirtazapine 45 milliGRAM(s) Oral at bedtime  oxybutynin 10 milliGRAM(s) Oral two times a day  pantoprazole    Tablet 40 milliGRAM(s) Oral before breakfast  PARoxetine 30 milliGRAM(s) Oral daily  polyethylene glycol 3350 17 Gram(s) Oral daily  QUEtiapine 100 milliGRAM(s) Oral daily  senna 2 Tablet(s) Oral at bedtime  theophylline ER (24 Hour) 400 milliGRAM(s) Oral daily  tiotropium 2.5 MICROgram(s) Inhaler 2 Puff(s) Inhalation daily      TELEMETRY: 	    ECG:  	  RADIOLOGY:   DIAGNOSTIC TESTING:  [ ] Echocardiogram:  [ ]  Catheterization:  [ ] Stress Test:    OTHER: 	    LABS:	 	                      
Patient is a 68y old  Female who presents with a chief complaint of     SUBJECTIVE / OVERNIGHT EVENTS: Comfortable without new complaints.   Review of Systems  chest pain no  palpitations no  sob no  nausea no  headache no    MEDICATIONS  (STANDING):  albuterol/ipratropium for Nebulization 3 milliLiter(s) Nebulizer every 6 hours  clonazePAM  Tablet 0.5 milliGRAM(s) Oral every 12 hours  dextrose 5%. 1000 milliLiter(s) (100 mL/Hr) IV Continuous <Continuous>  dextrose 5%. 1000 milliLiter(s) (50 mL/Hr) IV Continuous <Continuous>  dextrose 50% Injectable 12.5 Gram(s) IV Push once  dextrose 50% Injectable 25 Gram(s) IV Push once  dextrose 50% Injectable 25 Gram(s) IV Push once  enoxaparin Injectable 40 milliGRAM(s) SubCutaneous every 24 hours  fenofibrate Tablet 145 milliGRAM(s) Oral daily  glucagon  Injectable 1 milliGRAM(s) IntraMuscular once  insulin lispro (ADMELOG) corrective regimen sliding scale   SubCutaneous three times a day before meals  insulin lispro (ADMELOG) corrective regimen sliding scale   SubCutaneous at bedtime  mirtazapine 45 milliGRAM(s) Oral at bedtime  oxybutynin 10 milliGRAM(s) Oral two times a day  pantoprazole    Tablet 40 milliGRAM(s) Oral before breakfast  PARoxetine 30 milliGRAM(s) Oral daily  polyethylene glycol 3350 17 Gram(s) Oral daily  QUEtiapine 100 milliGRAM(s) Oral daily  senna 2 Tablet(s) Oral at bedtime  theophylline ER (24 Hour) 400 milliGRAM(s) Oral daily  tiotropium 2.5 MICROgram(s) Inhaler 2 Puff(s) Inhalation daily    MEDICATIONS  (PRN):  acetaminophen     Tablet .. 650 milliGRAM(s) Oral every 6 hours PRN Temp greater or equal to 38.5C (101.3F), Mild Pain (1 - 3)  dextrose Oral Gel 15 Gram(s) Oral once PRN Blood Glucose LESS THAN 70 milliGRAM(s)/deciliter  nicotine  Polacrilex Gum 4 milliGRAM(s) Oral three times a day PRN smoking cessation  oxycodone    5 mG/acetaminophen 325 mG 2 Tablet(s) Oral every 6 hours PRN Severe Pain (7 - 10)  oxycodone    5 mG/acetaminophen 325 mG 1 Tablet(s) Oral every 6 hours PRN Moderate Pain (4 - 6)      Vital Signs Last 24 Hrs  T(C): 36.7 (2023 13:59), Max: 36.9 (2023 05:28)  T(F): 98 (2023 13:59), Max: 98.5 (2023 05:28)  HR: 88 (2023 13:59) (78 - 97)  BP: 120/80 (2023 13:59) (120/80 - 127/68)  BP(mean): --  RR: 18 (2023 13:59) (18 - 18)  SpO2: 90% (2023 13:59) (90% - 93%)    Parameters below as of 2023 13:59  Patient On (Oxygen Delivery Method): nasal cannula  O2 Flow (L/min): 3      PHYSICAL EXAM:  GENERAL: NAD, well-developed  HEAD:  Atraumatic, Normocephalic  EYES: EOMI, PERRLA, conjunctiva and sclera clear  NECK: Supple, No JVD  CHEST/LUNG: Clear to auscultation bilaterally; No wheeze  HEART: Regular rate and rhythm; No murmurs, rubs, or gallops  ABDOMEN: Soft, Nontender, Nondistended; Bowel sounds present  EXTREMITIES:  2+ Peripheral Pulses, No clubbing, cyanosis, or edema Bilateral feet with soft cast.  PSYCH: AAOx3  NEUROLOGY: non-focal  SKIN: No rashes or lesions    LABS:                Urinalysis Basic - ( 2023 19:36 )    Color: Yellow / Appearance: Clear / S.015 / pH: x  Gluc: x / Ketone: Negative mg/dL  / Bili: Negative / Urobili: 0.2 mg/dL   Blood: x / Protein: Negative mg/dL / Nitrite: Negative   Leuk Esterase: Negative / RBC: 0 /HPF / WBC 2 /HPF   Sq Epi: x / Non Sq Epi: 0 /HPF / Bacteria: Negative /HPF          RADIOLOGY & ADDITIONAL TESTS:    Imaging Personally Reviewed:    Consultant(s) Notes Reviewed:      Care Discussed with Consultants/Other Providers:  
Patient is a 68y old  Female who presents with a chief complaint of     SUBJECTIVE / OVERNIGHT EVENTS: Comfortable without new complaints.   Review of Systems  chest pain no  palpitations no  sob no  nausea no  headache no    MEDICATIONS  (STANDING):  albuterol/ipratropium for Nebulization 3 milliLiter(s) Nebulizer every 6 hours  clonazePAM  Tablet 0.5 milliGRAM(s) Oral every 12 hours  dextrose 5%. 1000 milliLiter(s) (50 mL/Hr) IV Continuous <Continuous>  dextrose 5%. 1000 milliLiter(s) (100 mL/Hr) IV Continuous <Continuous>  dextrose 50% Injectable 25 Gram(s) IV Push once  dextrose 50% Injectable 12.5 Gram(s) IV Push once  dextrose 50% Injectable 25 Gram(s) IV Push once  enoxaparin Injectable 40 milliGRAM(s) SubCutaneous every 24 hours  fenofibrate Tablet 145 milliGRAM(s) Oral daily  glucagon  Injectable 1 milliGRAM(s) IntraMuscular once  insulin lispro (ADMELOG) corrective regimen sliding scale   SubCutaneous three times a day before meals  insulin lispro (ADMELOG) corrective regimen sliding scale   SubCutaneous at bedtime  mirtazapine 45 milliGRAM(s) Oral at bedtime  oxybutynin 10 milliGRAM(s) Oral two times a day  pantoprazole    Tablet 40 milliGRAM(s) Oral before breakfast  PARoxetine 30 milliGRAM(s) Oral daily  QUEtiapine 100 milliGRAM(s) Oral daily  theophylline ER (24 Hour) 400 milliGRAM(s) Oral daily  tiotropium 2.5 MICROgram(s) Inhaler 2 Puff(s) Inhalation daily    MEDICATIONS  (PRN):  acetaminophen     Tablet .. 650 milliGRAM(s) Oral every 6 hours PRN Temp greater or equal to 38.5C (101.3F), Mild Pain (1 - 3)  dextrose Oral Gel 15 Gram(s) Oral once PRN Blood Glucose LESS THAN 70 milliGRAM(s)/deciliter  nicotine  Polacrilex Gum 4 milliGRAM(s) Oral three times a day PRN smoking cessation  oxycodone    5 mG/acetaminophen 325 mG 1 Tablet(s) Oral every 6 hours PRN Moderate Pain (4 - 6)  oxycodone    5 mG/acetaminophen 325 mG 2 Tablet(s) Oral every 6 hours PRN Severe Pain (7 - 10)      Vital Signs Last 24 Hrs  T(C): 36.6 (31 Oct 2023 13:59), Max: 36.7 (31 Oct 2023 05:24)  T(F): 97.9 (31 Oct 2023 13:59), Max: 98.1 (31 Oct 2023 05:24)  HR: 94 (31 Oct 2023 13:59) (71 - 94)  BP: 145/73 (31 Oct 2023 13:59) (112/67 - 145/73)  BP(mean): --  RR: 18 (31 Oct 2023 13:59) (18 - 20)  SpO2: 91% (31 Oct 2023 13:59) (91% - 95%)    Parameters below as of 31 Oct 2023 13:59  Patient On (Oxygen Delivery Method): nasal cannula  O2 Flow (L/min): 3      PHYSICAL EXAM:  GENERAL: NAD, well-developed  HEAD:  Atraumatic, Normocephalic  EYES: EOMI, PERRLA, conjunctiva and sclera clear  NECK: Supple, No JVD  CHEST/LUNG: Clear to auscultation bilaterally; No wheeze  HEART: Regular rate and rhythm; No murmurs, rubs, or gallops  ABDOMEN: Soft, Nontender, Nondistended; Bowel sounds present  EXTREMITIES:  2+ Peripheral Pulses, No clubbing, cyanosis, or edema Feet with soft cast   PSYCH: AAOx3  NEUROLOGY: non-focal  SKIN: No rashes or lesions    LABS:                      RADIOLOGY & ADDITIONAL TESTS:    Imaging Personally Reviewed:    Consultant(s) Notes Reviewed:      Care Discussed with Consultants/Other Providers:  
Patient is a 68y old  Female who presents with a chief complaint of     SUBJECTIVE / OVERNIGHT EVENTS: No new complaints.   Review of Systems  chest pain no  palpitations no  sob no  nausea no  headache no    MEDICATIONS  (STANDING):  albuterol/ipratropium for Nebulization 3 milliLiter(s) Nebulizer every 6 hours  clonazePAM  Tablet 0.5 milliGRAM(s) Oral every 12 hours  dextrose 5%. 1000 milliLiter(s) (50 mL/Hr) IV Continuous <Continuous>  dextrose 5%. 1000 milliLiter(s) (100 mL/Hr) IV Continuous <Continuous>  dextrose 50% Injectable 25 Gram(s) IV Push once  dextrose 50% Injectable 12.5 Gram(s) IV Push once  dextrose 50% Injectable 25 Gram(s) IV Push once  enoxaparin Injectable 40 milliGRAM(s) SubCutaneous every 24 hours  fenofibrate Tablet 145 milliGRAM(s) Oral daily  glucagon  Injectable 1 milliGRAM(s) IntraMuscular once  insulin lispro (ADMELOG) corrective regimen sliding scale   SubCutaneous three times a day before meals  insulin lispro (ADMELOG) corrective regimen sliding scale   SubCutaneous at bedtime  mirtazapine 45 milliGRAM(s) Oral at bedtime  oxybutynin 10 milliGRAM(s) Oral two times a day  pantoprazole    Tablet 40 milliGRAM(s) Oral before breakfast  PARoxetine 30 milliGRAM(s) Oral daily  polyethylene glycol 3350 17 Gram(s) Oral daily  QUEtiapine 100 milliGRAM(s) Oral daily  senna 2 Tablet(s) Oral at bedtime  theophylline ER (24 Hour) 400 milliGRAM(s) Oral daily  tiotropium 2.5 MICROgram(s) Inhaler 2 Puff(s) Inhalation daily    MEDICATIONS  (PRN):  acetaminophen     Tablet .. 650 milliGRAM(s) Oral every 6 hours PRN Temp greater or equal to 38.5C (101.3F), Mild Pain (1 - 3)  dextrose Oral Gel 15 Gram(s) Oral once PRN Blood Glucose LESS THAN 70 milliGRAM(s)/deciliter  nicotine  Polacrilex Gum 4 milliGRAM(s) Oral three times a day PRN smoking cessation  oxycodone    5 mG/acetaminophen 325 mG 1 Tablet(s) Oral every 6 hours PRN Moderate Pain (4 - 6)  oxycodone    5 mG/acetaminophen 325 mG 2 Tablet(s) Oral every 6 hours PRN Severe Pain (7 - 10)      Vital Signs Last 24 Hrs  T(C): 36.7 (03 Nov 2023 14:21), Max: 36.8 (03 Nov 2023 04:45)  T(F): 98 (03 Nov 2023 14:21), Max: 98.2 (03 Nov 2023 04:45)  HR: 86 (03 Nov 2023 14:21) (86 - 87)  BP: 124/63 (03 Nov 2023 14:21) (123/67 - 136/75)  BP(mean): --  RR: 18 (03 Nov 2023 14:21) (18 - 18)  SpO2: 95% (03 Nov 2023 14:21) (92% - 95%)    Parameters below as of 03 Nov 2023 14:21  Patient On (Oxygen Delivery Method): nasal cannula  O2 Flow (L/min): 3      PHYSICAL EXAM:  GENERAL: NAD, well-developed  HEAD:  Atraumatic, Normocephalic  EYES: EOMI, PERRLA, conjunctiva and sclera clear  NECK: Supple, No JVD  CHEST/LUNG: Clear to auscultation bilaterally; No wheeze  HEART: Regular rate and rhythm; No murmurs, rubs, or gallops  ABDOMEN: Soft, Nontender, Nondistended; Bowel sounds present  EXTREMITIES:  Bilateral feet with soft cast   PSYCH: AAOx3  NEUROLOGY: non-focal  SKIN: No rashes or lesions    LABS:                      RADIOLOGY & ADDITIONAL TESTS:    Imaging Personally Reviewed:    Consultant(s) Notes Reviewed:      Care Discussed with Consultants/Other Providers:  
Patient is a 68y old  Female who presents with a chief complaint of     SUBJECTIVE / OVERNIGHT EVENTS: c/o feet pain   Review of Systems  chest pain no  palpitations no  sob no  nausea no  headache no    MEDICATIONS  (STANDING):  albuterol/ipratropium for Nebulization 3 milliLiter(s) Nebulizer every 6 hours  clonazePAM  Tablet 0.5 milliGRAM(s) Oral every 12 hours  dextrose 5%. 1000 milliLiter(s) (50 mL/Hr) IV Continuous <Continuous>  dextrose 5%. 1000 milliLiter(s) (100 mL/Hr) IV Continuous <Continuous>  dextrose 50% Injectable 25 Gram(s) IV Push once  dextrose 50% Injectable 12.5 Gram(s) IV Push once  dextrose 50% Injectable 25 Gram(s) IV Push once  enoxaparin Injectable 40 milliGRAM(s) SubCutaneous every 24 hours  fenofibrate Tablet 145 milliGRAM(s) Oral daily  glucagon  Injectable 1 milliGRAM(s) IntraMuscular once  insulin lispro (ADMELOG) corrective regimen sliding scale   SubCutaneous three times a day before meals  insulin lispro (ADMELOG) corrective regimen sliding scale   SubCutaneous at bedtime  mirtazapine 45 milliGRAM(s) Oral at bedtime  oxybutynin 10 milliGRAM(s) Oral two times a day  pantoprazole    Tablet 40 milliGRAM(s) Oral before breakfast  PARoxetine 30 milliGRAM(s) Oral daily  QUEtiapine 100 milliGRAM(s) Oral daily  theophylline ER (24 Hour) 400 milliGRAM(s) Oral daily  tiotropium 2.5 MICROgram(s) Inhaler 2 Puff(s) Inhalation daily    MEDICATIONS  (PRN):  acetaminophen     Tablet .. 650 milliGRAM(s) Oral every 6 hours PRN Temp greater or equal to 38.5C (101.3F), Mild Pain (1 - 3)  dextrose Oral Gel 15 Gram(s) Oral once PRN Blood Glucose LESS THAN 70 milliGRAM(s)/deciliter  nicotine  Polacrilex Gum 4 milliGRAM(s) Oral three times a day PRN smoking cessation  oxycodone    5 mG/acetaminophen 325 mG 1 Tablet(s) Oral every 6 hours PRN Moderate Pain (4 - 6)  oxycodone    5 mG/acetaminophen 325 mG 2 Tablet(s) Oral every 6 hours PRN Severe Pain (7 - 10)      Vital Signs Last 24 Hrs  T(C): 36.4 (30 Oct 2023 13:36), Max: 37 (30 Oct 2023 08:50)  T(F): 97.5 (30 Oct 2023 13:36), Max: 98.6 (30 Oct 2023 08:50)  HR: 69 (30 Oct 2023 13:36) (69 - 80)  BP: 113/57 (30 Oct 2023 13:36) (105/60 - 133/78)  BP(mean): 90 (29 Oct 2023 21:58) (90 - 90)  RR: 20 (30 Oct 2023 13:36) (17 - 20)  SpO2: 93% (30 Oct 2023 13:36) (85% - 93%)    Parameters below as of 30 Oct 2023 13:36  Patient On (Oxygen Delivery Method): nasal cannula  O2 Flow (L/min): 3      PHYSICAL EXAM:  GENERAL: NAD, well-developed  HEAD:  Atraumatic, Normocephalic  EYES: EOMI, PERRLA, conjunctiva and sclera clear  NECK: Supple, No JVD  CHEST/LUNG: Clear to auscultation bilaterally; No wheeze  HEART: Regular rate and rhythm; No murmurs, rubs, or gallops  ABDOMEN: Soft, Nontender, Nondistended; Bowel sounds present  EXTREMITIES:  2+ Peripheral Pulses, Bilateral feet with soft cast  PSYCH: AAOx3  NEUROLOGY: non-focal  SKIN: No rashes or lesions    LABS:                        13.5   8.04  )-----------( 431      ( 29 Oct 2023 12:57 )             41.8     10-29    138  |  102  |  23  ----------------------------<  110<H>  4.3   |  26  |  0.63    Ca    9.8      29 Oct 2023 12:57    TPro  7.2  /  Alb  4.4  /  TBili  0.2  /  DBili  x   /  AST  15  /  ALT  10  /  AlkPhos  38<L>  10-29          Urinalysis Basic - ( 29 Oct 2023 12:57 )    Color: x / Appearance: x / SG: x / pH: x  Gluc: 110 mg/dL / Ketone: x  / Bili: x / Urobili: x   Blood: x / Protein: x / Nitrite: x   Leuk Esterase: x / RBC: x / WBC x   Sq Epi: x / Non Sq Epi: x / Bacteria: x          RADIOLOGY & ADDITIONAL TESTS:    Imaging Personally Reviewed:    Consultant(s) Notes Reviewed:      Care Discussed with Consultants/Other Providers:

## 2023-11-06 ENCOUNTER — TRANSCRIPTION ENCOUNTER (OUTPATIENT)
Age: 68
End: 2023-11-06

## 2023-11-07 NOTE — ED ADULT NURSE NOTE - NS PRO PASSIVE SMOKE EXP
INR at goal. Medications and chart reviewed. No changes noted to necessitate adjustment of warfarin or follow-up plan. See calendar.  Pt findings: Pt denies any changes.  He inquired about being able to be switched to a DOAC since he would like to eat more veggies. Maintain current regimen & re-assess in 3 weeks.        Unknown No

## 2023-11-13 PROCEDURE — C8924: CPT

## 2023-11-13 PROCEDURE — 96375 TX/PRO/DX INJ NEW DRUG ADDON: CPT

## 2023-11-13 PROCEDURE — 76377 3D RENDER W/INTRP POSTPROCES: CPT

## 2023-11-13 PROCEDURE — 82962 GLUCOSE BLOOD TEST: CPT

## 2023-11-13 PROCEDURE — 94640 AIRWAY INHALATION TREATMENT: CPT

## 2023-11-13 PROCEDURE — 80053 COMPREHEN METABOLIC PANEL: CPT

## 2023-11-13 PROCEDURE — 36415 COLL VENOUS BLD VENIPUNCTURE: CPT

## 2023-11-13 PROCEDURE — 73630 X-RAY EXAM OF FOOT: CPT

## 2023-11-13 PROCEDURE — 99285 EMERGENCY DEPT VISIT HI MDM: CPT

## 2023-11-13 PROCEDURE — 93321 DOPPLER ECHO F-UP/LMTD STD: CPT

## 2023-11-13 PROCEDURE — 85025 COMPLETE CBC W/AUTO DIFF WBC: CPT

## 2023-11-13 PROCEDURE — 81001 URINALYSIS AUTO W/SCOPE: CPT

## 2023-11-13 PROCEDURE — 96374 THER/PROPH/DIAG INJ IV PUSH: CPT

## 2023-11-13 PROCEDURE — 73610 X-RAY EXAM OF ANKLE: CPT

## 2023-11-13 PROCEDURE — 73700 CT LOWER EXTREMITY W/O DYE: CPT | Mod: MA

## 2023-11-13 PROCEDURE — 93005 ELECTROCARDIOGRAM TRACING: CPT

## 2023-11-13 PROCEDURE — 97162 PT EVAL MOD COMPLEX 30 MIN: CPT

## 2024-02-26 RX ORDER — THEOPHYLLINE ANHYDROUS 400 MG/1
400 CAPSULE, EXTENDED RELEASE ORAL
Qty: 90 | Refills: 1 | Status: DISCONTINUED | COMMUNITY
Start: 2023-10-05 | End: 2024-02-26

## 2024-02-26 RX ORDER — ROFLUMILAST 500 UG/1
500 TABLET ORAL DAILY
Qty: 90 | Refills: 1 | Status: ACTIVE | COMMUNITY
Start: 2024-02-26 | End: 1900-01-01

## 2024-03-20 ENCOUNTER — EMERGENCY (EMERGENCY)
Facility: HOSPITAL | Age: 69
LOS: 1 days | Discharge: HOME | End: 2024-03-20
Attending: EMERGENCY MEDICINE
Payer: COMMERCIAL

## 2024-03-20 VITALS
DIASTOLIC BLOOD PRESSURE: 76 MMHG | SYSTOLIC BLOOD PRESSURE: 130 MMHG | HEART RATE: 84 BPM | OXYGEN SATURATION: 90 % | HEIGHT: 62 IN | RESPIRATION RATE: 20 BRPM | WEIGHT: 145.95 LBS | TEMPERATURE: 97 F

## 2024-03-20 DIAGNOSIS — Z96.641 PRESENCE OF RIGHT ARTIFICIAL HIP JOINT: Chronic | ICD-10-CM

## 2024-03-20 LAB
ALBUMIN SERPL ELPH-MCNC: 4.6 G/DL — SIGNIFICANT CHANGE UP (ref 3.3–5)
ALP SERPL-CCNC: 49 U/L — SIGNIFICANT CHANGE UP (ref 40–120)
ALT FLD-CCNC: 18 U/L — SIGNIFICANT CHANGE UP (ref 10–45)
ANION GAP SERPL CALC-SCNC: 12 MMOL/L — SIGNIFICANT CHANGE UP (ref 5–17)
AST SERPL-CCNC: 21 U/L — SIGNIFICANT CHANGE UP (ref 10–40)
BASOPHILS # BLD AUTO: 0.04 K/UL — SIGNIFICANT CHANGE UP (ref 0–0.2)
BASOPHILS NFR BLD AUTO: 0.6 % — SIGNIFICANT CHANGE UP (ref 0–2)
BILIRUB SERPL-MCNC: 0.2 MG/DL — SIGNIFICANT CHANGE UP (ref 0.2–1.2)
BUN SERPL-MCNC: 12 MG/DL — SIGNIFICANT CHANGE UP (ref 7–23)
CALCIUM SERPL-MCNC: 10.7 MG/DL — HIGH (ref 8.4–10.5)
CHLORIDE SERPL-SCNC: 103 MMOL/L — SIGNIFICANT CHANGE UP (ref 96–108)
CO2 SERPL-SCNC: 25 MMOL/L — SIGNIFICANT CHANGE UP (ref 22–31)
CREAT SERPL-MCNC: 0.6 MG/DL — SIGNIFICANT CHANGE UP (ref 0.5–1.3)
EGFR: 98 ML/MIN/1.73M2 — SIGNIFICANT CHANGE UP
EOSINOPHIL # BLD AUTO: 0.16 K/UL — SIGNIFICANT CHANGE UP (ref 0–0.5)
EOSINOPHIL NFR BLD AUTO: 2.5 % — SIGNIFICANT CHANGE UP (ref 0–6)
GLUCOSE SERPL-MCNC: 152 MG/DL — HIGH (ref 70–99)
HCT VFR BLD CALC: 46.8 % — HIGH (ref 34.5–45)
HGB BLD-MCNC: 15.3 G/DL — SIGNIFICANT CHANGE UP (ref 11.5–15.5)
IMM GRANULOCYTES NFR BLD AUTO: 0.6 % — SIGNIFICANT CHANGE UP (ref 0–0.9)
LYMPHOCYTES # BLD AUTO: 1.95 K/UL — SIGNIFICANT CHANGE UP (ref 1–3.3)
LYMPHOCYTES # BLD AUTO: 30.5 % — SIGNIFICANT CHANGE UP (ref 13–44)
MAGNESIUM SERPL-MCNC: 1.9 MG/DL — SIGNIFICANT CHANGE UP (ref 1.6–2.6)
MCHC RBC-ENTMCNC: 28.4 PG — SIGNIFICANT CHANGE UP (ref 27–34)
MCHC RBC-ENTMCNC: 32.7 GM/DL — SIGNIFICANT CHANGE UP (ref 32–36)
MCV RBC AUTO: 86.8 FL — SIGNIFICANT CHANGE UP (ref 80–100)
MONOCYTES # BLD AUTO: 0.59 K/UL — SIGNIFICANT CHANGE UP (ref 0–0.9)
MONOCYTES NFR BLD AUTO: 9.2 % — SIGNIFICANT CHANGE UP (ref 2–14)
NEUTROPHILS # BLD AUTO: 3.61 K/UL — SIGNIFICANT CHANGE UP (ref 1.8–7.4)
NEUTROPHILS NFR BLD AUTO: 56.6 % — SIGNIFICANT CHANGE UP (ref 43–77)
NRBC # BLD: 0 /100 WBCS — SIGNIFICANT CHANGE UP (ref 0–0)
PHOSPHATE SERPL-MCNC: 3.6 MG/DL — SIGNIFICANT CHANGE UP (ref 2.5–4.5)
PLATELET # BLD AUTO: 412 K/UL — HIGH (ref 150–400)
POTASSIUM SERPL-MCNC: 3.7 MMOL/L — SIGNIFICANT CHANGE UP (ref 3.5–5.3)
POTASSIUM SERPL-SCNC: 3.7 MMOL/L — SIGNIFICANT CHANGE UP (ref 3.5–5.3)
PROT SERPL-MCNC: 7.6 G/DL — SIGNIFICANT CHANGE UP (ref 6–8.3)
RBC # BLD: 5.39 M/UL — HIGH (ref 3.8–5.2)
RBC # FLD: 15 % — HIGH (ref 10.3–14.5)
SODIUM SERPL-SCNC: 140 MMOL/L — SIGNIFICANT CHANGE UP (ref 135–145)
WBC # BLD: 6.39 K/UL — SIGNIFICANT CHANGE UP (ref 3.8–10.5)
WBC # FLD AUTO: 6.39 K/UL — SIGNIFICANT CHANGE UP (ref 3.8–10.5)

## 2024-03-20 PROCEDURE — 99284 EMERGENCY DEPT VISIT MOD MDM: CPT

## 2024-03-20 RX ORDER — SODIUM CHLORIDE 9 MG/ML
1000 INJECTION INTRAMUSCULAR; INTRAVENOUS; SUBCUTANEOUS ONCE
Refills: 0 | Status: COMPLETED | OUTPATIENT
Start: 2024-03-20 | End: 2024-03-20

## 2024-03-20 RX ADMIN — SODIUM CHLORIDE 1000 MILLILITER(S): 9 INJECTION INTRAMUSCULAR; INTRAVENOUS; SUBCUTANEOUS at 23:11

## 2024-03-20 NOTE — ED PROVIDER NOTE - NSFOLLOWUPCLINICS_GEN_ALL_ED_FT
Gastroenterology at [facility]  Gastroenterology  [address] [place] [id number]  Phone: [phone]  Fax:

## 2024-03-20 NOTE — ED PROVIDER NOTE - PATIENT PORTAL LINK FT
You can access the FollowMyHealth Patient Portal offered by [facility] by registering at the following website: [email or web address]. By joining [email or web address]’s FollowMyHealth portal, you will also be able to view your health information using other applications (apps) compatible with our system.

## 2024-03-20 NOTE — ED PROVIDER NOTE - IV ALTEPLASE ADMIN OUTSIDE HIDDEN
History of Present Illness


Consult date: 03/12/20


Chief complaint: Hypotension


History of present illness: 





This is a 77-year-old female patient with known history of nonalcoholic liver 

cirrhosis.  The patient also has history of diabetes and hypertension.  She has 

had previous paracentesis on several occasions for abdominal distention and 

ascites.  The patient came into the hospital and the patient was noted to have 

increased abdominal distention associated with some shortness of breath.  She 

also had some limited chest pain.  No nausea or vomiting.  She underwent a 

paracentesis for a total of 3.2 L of ascitic fluid was removed and it was not 

sent for any cultures.  The patient subsequently improved and she was doing 

well.  Her troponins were positive at 0.5.  The patient was given a cardiac 

stress test that came back positive for reversible ischemia and based on that 

and based on the positive stress test, the patient was being planned for a 

cardiac catheterization.  Earlier this morning the patient went into atrial 

fibrillation with RVR.  Her blood pressure dropped and she got transferred to 

the intensive care unit.  Upon arrival her systolic blood pressure was in the 

60s.  The patient was started on IV fluids bolus and she was given 1.5 L.  

Subsequently she was given 2.5 mg of IV Lopressor.  This afternoon she converted

back to normal sinus rhythm.  She is currently in normal sinus rhythm with a 

rate of 61.  She is on IV heparin.  She is doing well without any chest pain.  

She is on norepinephrine infusion running at 0.09 g per KG per minute.  Her 

most recent blood pressure 95/44.  Note that her creatinine came up to 1.8 and 

the patient is producing urine output in the order of 30 mL an hour.  No 

abdominal distention.  Chest x-ray shows some cardiomegaly.  Echo of the heart 

showed EF of around 55-60%.  Resume cardiac structures were all within normal 

limits.  The patient had mild aortic stenosis.  The right ventricular systolic 

pressure was 43.





Review of Systems


Constitutional: Reports fatigue, Reports weakness


Eyes: denies as per HPI, denies blurred vision, denies bulging eye, denies decre

ased vision, denies diplopia, denies discharge, denies dry eye, denies 

irritation, denies itching, denies pain, denies photophobia, denies loss of 

peripheral vision, denies loss of vision, denies tunnel vision/blind spots


Ears: deny: decreased hearing, ear discharge, earache, tinnitus


Ears, nose, mouth and throat: Denies headache, Denies sore throat


Breasts: absent: as per HPI, change in shape, gynecomastia, masses, nipple 

discharge, pain, skin changes, swelling


Cardiovascular: Reports decreased exercise tolerance, Reports dyspnea on 

exertion, Denies chest pain, Denies shortness of breath


Respiratory: Reports as per HPI, Reports dyspnea


Gastrointestinal: Reports bloating


Genitourinary: Reports as per HPI


Menstruation: Reports as per HPI


Musculoskeletal: Reports as per HPI


Musculoskeletal: absent: ankle pain, ankle stiffness, ankle swelling


Psychiatric: Reports as per HPI


Endocrine: Reports as per HPI, Reports flushing


Hematologic/Lymphatic: Reports as per HPI


Allergic/Immunologic: Reports as per HPI





Past Medical History


Past Medical History: Cancer, Diabetes Mellitus, Hypertension, Liver Disease


Additional Past Medical History / Comment(s): Pt recently admitted to [facility] on 

2/19/20 with symptomatic ascities/had paracentesis 5 L removed, thrombocytopenia

2ndary to cirrhosis/splenic sequestration and portal HTN.        Other:  

Nonalcoholic cirrhosis-thought d/t rx, ascities/paracentesis, L breast cancer 

with lumpectomy, NIDDM type II, neuropathy bilateral legs/feet,


History of Any Multi-Drug Resistant Organisms: None Reported


Past Surgical History: Breast Surgery, Cholecystectomy


Additional Past Surgical History / Comment(s): L breast lumpectomy for cancer, 

right breast lump removal-benign, paracentesis-several,


Past Anesthesia/Blood Transfusion Reactions: No Reported Reaction


Smoking Status: Never smoker





- Past Family History


  ** Father


Family Medical History: Cancer


Additional Family Medical History / Comment(s): Colon cancer





  ** Brother(s)


Family Medical History: Myocardial Infarction (MI)


Additional Family Medical History / Comment(s): One brother with stents, another

brother passed away from an MI





  ** Mother


Family Medical History: Neurologic Disorder


Additional Family Medical History / Comment(s): Parkinsons





Medications and Allergies


                                Home Medications











 Medication  Instructions  Recorded  Confirmed  Type


 


Cholecalciferol (Vitamin D3) 2,000 unit PO DAILY 11/09/19 03/10/20 History





[Vitamin D3]    


 


Pioglitazone [Actos] 30 mg PO DAILY 11/09/19 03/10/20 History


 


Sertraline [Zoloft] 50 mg PO DAILY 11/09/19 03/10/20 History


 


sitaGLIPtin PHOSPHATE [Januvia] 100 mg PO DAILY 11/09/19 03/10/20 History


 


Cyanocobalamin [Vitamin B-12] 1,000 mcg PO DAILY #60 tab 11/12/19 03/10/20 Rx


 


Furosemide [Lasix] 40 mg PO BID #30 tablet 02/20/20 03/10/20 Rx


 


Lisinopril [Zestril] 5 mg PO DAILY #0 02/20/20 03/10/20 Rx


 


Niacin 2,000 mg PO DAILY 03/10/20 03/10/20 History


 


Spironolactone 50 mg PO DAILY 03/10/20 03/10/20 History








                                    Allergies











Allergy/AdvReac Type Severity Reaction Status Date / Time


 


No Known Allergies Allergy   Verified 03/10/20 10:16














Physical Exam


Vitals: 


                                   Vital Signs











  Temp Pulse Pulse Resp BP BP BP


 


 03/12/20 16:15   63   17  76/35  


 


 03/12/20 16:00   114 H   14  87/57  


 


 03/12/20 15:45   125 H   21  76/53  


 


 03/12/20 15:30     17  80/45  


 


 03/12/20 15:15   125 H   16  82/51  


 


 03/12/20 15:00   120 H   18  73/34  


 


 03/12/20 14:45   107 H   14  85/54  


 


 03/12/20 14:30   118 H   12  107/85  


 


 03/12/20 14:15   122 H   12  87/61  


 


 03/12/20 14:00   110 H   18  88/64  


 


 03/12/20 13:45   124 H   14  99/58  


 


 03/12/20 13:30   102 H   18  84/68  


 


 03/12/20 13:15   121 H   13  67/53  


 


 03/12/20 13:00   123 H   15  82/55  


 


 03/12/20 12:45   128 H   23  76/55  


 


 03/12/20 12:30   123 H   18  81/59  


 


 03/12/20 12:15   108 H   24  94/59  


 


 03/12/20 12:00  97.1 F L  110 H   19  84/58  


 


 03/12/20 11:45   123 H   22  81/56  


 


 03/12/20 11:30   114 H   14  84/58  


 


 03/12/20 11:15   108 H   13  86/59  


 


 03/12/20 11:00   117 H   12  94/53  


 


 03/12/20 10:45   125 H   15  94/64  


 


 03/12/20 10:30   130 H   12  93/57  


 


 03/12/20 10:15   111 H   12  84/57  


 


 03/12/20 10:00   120 H   15  89/50  


 


 03/12/20 09:45   113 H   12  76/44  


 


 03/12/20 09:30   124 H   17  79/39  


 


 03/12/20 09:15  97.6 F  141 H   21  109/89  


 


 03/12/20 07:51    136 H     72/44


 


 03/12/20 05:00  97.7 F   85  18   103/59 


 


 03/11/20 19:37  97.6 F   94  12    98/47














  Pulse Ox


 


 03/12/20 16:15  92 L


 


 03/12/20 16:00  98


 


 03/12/20 15:45  99


 


 03/12/20 15:30  100


 


 03/12/20 15:15  97


 


 03/12/20 15:00  98


 


 03/12/20 14:45  94 L


 


 03/12/20 14:30  97


 


 03/12/20 14:15  98


 


 03/12/20 14:00  99


 


 03/12/20 13:45  99


 


 03/12/20 13:30  99


 


 03/12/20 13:15  98


 


 03/12/20 13:00  98


 


 03/12/20 12:45  97


 


 03/12/20 12:30  99


 


 03/12/20 12:15  100


 


 03/12/20 12:00  98


 


 03/12/20 11:45  98


 


 03/12/20 11:30  99


 


 03/12/20 11:15  98


 


 03/12/20 11:00  100


 


 03/12/20 10:45  99


 


 03/12/20 10:30  99


 


 03/12/20 10:15  98


 


 03/12/20 10:00  96


 


 03/12/20 09:45  98


 


 03/12/20 09:30  97


 


 03/12/20 09:15  97


 


 03/12/20 07:51 


 


 03/12/20 05:00  98


 


 03/11/20 19:37  100








                                Intake and Output











 03/12/20 03/12/20 03/12/20





 06:59 14:59 22:59


 


Intake Total 666.56 1420 419.627


 


Output Total  265 60


 


Balance 666.56 1155 359.627


 


Intake:   


 


  IV  1300 250


 


    Magnesium Sulfate-D5w Pmx   100





    1 gm In Dextrose/Water 1   





    100ml.bag @ 100 mls/hr   





    IVPB Q1H [facility] Rx#:   





    [id number]   


 


    Sodium Chloride 0.9% 1,  300 150





    000 ml @ 75 mls/hr IV .   





    [id number] [facility] Rx#:[id number]   


 


    Sodium Chloride 0.9% 1,  1000 





    000 ml @ 999 mls/hr IV .   





    Q1H1M [facility] Rx#:[id number]   


 


  Intake, IV Titration 666.56  49.627





  Amount   


 


    Norepinephrine 4 mg In   49.627





    Sodium Chloride 0.9% 250   





    ml @ 0.05 MCG/KG/MIN 15.   





    872 mls/hr IV .Q16H1M [facility]   





    Rx#:[id number]   


 


    Sodium Chloride 0.9% 1, 666.56  





    000 ml In Empty Bag 1 bag   





    @ 1 ML/KG/HR 83.32 mls/   





    hr IV .Q12H1M [facility] Rx#:   





    [id number]   


 


  Oral  120 120


 


Output:   


 


  Urine  265 60


 


Other:   


 


  Voiding Method  Indwelling Catheter Indwelling Catheter














Gen. appearance, comfortable likely distress


Head exam was generally normal. There was no scleral icterus or corneal arcus. M

ucous membranes were moist.


Neck was supple and without jugular venous distension, thyromegaly, or carotid 

bruits. Carotids were easily palpable bilaterally. There was no adenopathy.


Lungs sounds are diminished in lung bases bilaterally.


Cardiac exam revealed the PMI to be normally situated and sized. The rhythm was 

regular and no extrasystoles were noted during several minutes of auscultation. 

The first and second heart sounds were normal and physiologic splitting of the 

second heart sound was noted. There were no murmurs, rubs, clicks, or gallops.


Abdomen is nondistended.  There may be some minimal ascites.  No direct 

tenderness or rebound tenderness or guarding.


Extremities revealed trace edema and there is no cyanosis or clubbing.


Neurologically the patient is awake and alert and is no focal neurological 

deficit.





Results





- Laboratory Findings


CBC and BMP: 


                                 03/12/20 07:31





                                 03/12/20 10:20


PT/INR, D-dimer











PT  12.7 sec (9.0-12.0)  H  03/12/20  10:20    


 


INR  1.3  (<1.2)  H  03/12/20  10:20    








Abnormal lab findings: 


                                  Abnormal Labs











  03/09/20 03/09/20 03/09/20





  23:59 23:59 23:59


 


Hgb  11.2 L  


 


Hct   


 


MCV  79.2 L  


 


MCH   


 


MCHC   


 


RDW  16.3 H  


 


Plt Count  129 L  


 


Lymphocytes #  0.6 L  


 


PT   12.1 H 


 


INR   1.2 H 


 


Sodium    132 L


 


Chloride    96 L


 


BUN    33 H


 


Creatinine   


 


Glucose    339 H


 


POC Glucose (mg/dL)   


 


Calcium   


 


Troponin I   


 


Total Protein    6.2 L


 


Albumin    3.3 L


 


LDL Cholesterol, Calc   


 


Urine Appearance   


 


Urine Blood   


 


Ur Leukocyte Esterase   


 


Urine WBC   


 


Ur Squamous Epith Cells   


 


Urine Bacteria   


 


Hyaline Casts   


 


Urine Mucus   














  03/09/20 03/10/20 03/10/20





  23:59 05:09 06:16


 


Hgb   


 


Hct   


 


MCV   


 


MCH   


 


MCHC   


 


RDW   


 


Plt Count   


 


Lymphocytes #   


 


PT   


 


INR   


 


Sodium   


 


Chloride   


 


BUN   


 


Creatinine   


 


Glucose   


 


POC Glucose (mg/dL)   340 H  298 H


 


Calcium   


 


Troponin I  0.050 H*  


 


Total Protein   


 


Albumin   


 


LDL Cholesterol, Calc   


 


Urine Appearance   


 


Urine Blood   


 


Ur Leukocyte Esterase   


 


Urine WBC   


 


Ur Squamous Epith Cells   


 


Urine Bacteria   


 


Hyaline Casts   


 


Urine Mucus   














  03/10/20 03/10/20 03/11/20





  06:30 20:47 03:59


 


Hgb   


 


Hct   


 


MCV   


 


MCH   


 


MCHC   


 


RDW   


 


Plt Count   


 


Lymphocytes #   


 


PT   


 


INR   


 


Sodium   


 


Chloride   


 


BUN   


 


Creatinine   


 


Glucose   


 


POC Glucose (mg/dL)   304 H 


 


Calcium   


 


Troponin I  0.038 H*  


 


Total Protein   


 


Albumin   


 


LDL Cholesterol, Calc   


 


Urine Appearance    Cloudy H


 


Urine Blood    Small H


 


Ur Leukocyte Esterase    Large H


 


Urine WBC    31 H


 


Ur Squamous Epith Cells    5 H


 


Urine Bacteria    Moderate H


 


Hyaline Casts    265 H


 


Urine Mucus    Rare H














  03/11/20 03/11/20 03/11/20





  07:17 08:51 08:51


 


Hgb   


 


Hct   


 


MCV   


 


MCH    24.6 L


 


MCHC    30.0 L


 


RDW    16.3 H


 


Plt Count    114 L


 


Lymphocytes #   


 


PT   


 


INR   


 


Sodium   135 L 


 


Chloride   


 


BUN   39 H 


 


Creatinine   1.43 H 


 


Glucose   214 H 


 


POC Glucose (mg/dL)  227 H  


 


Calcium   


 


Troponin I   


 


Total Protein   


 


Albumin   


 


LDL Cholesterol, Calc   105 H 


 


Urine Appearance   


 


Urine Blood   


 


Ur Leukocyte Esterase   


 


Urine WBC   


 


Ur Squamous Epith Cells   


 


Urine Bacteria   


 


Hyaline Casts   


 


Urine Mucus   














  03/11/20 03/11/20 03/11/20





  11:30 17:08 19:57


 


Hgb   


 


Hct   


 


MCV   


 


MCH   


 


MCHC   


 


RDW   


 


Plt Count   


 


Lymphocytes #   


 


PT   


 


INR   


 


Sodium   


 


Chloride   


 


BUN   


 


Creatinine   


 


Glucose   


 


POC Glucose (mg/dL)  184 H  228 H  308 H


 


Calcium   


 


Troponin I   


 


Total Protein   


 


Albumin   


 


LDL Cholesterol, Calc   


 


Urine Appearance   


 


Urine Blood   


 


Ur Leukocyte Esterase   


 


Urine WBC   


 


Ur Squamous Epith Cells   


 


Urine Bacteria   


 


Hyaline Casts   


 


Urine Mucus   














  03/12/20 03/12/20 03/12/20





  07:21 07:31 07:31


 


Hgb   10.4 L 


 


Hct   33.3 L 


 


MCV   


 


MCH   


 


MCHC   


 


RDW   15.9 H 


 


Plt Count   84 L 


 


Lymphocytes #   0.8 L 


 


PT   


 


INR   


 


Sodium   


 


Chloride   


 


BUN   


 


Creatinine    1.85 H


 


Glucose   


 


POC Glucose (mg/dL)  205 H  


 


Calcium   


 


Troponin I   


 


Total Protein   


 


Albumin   


 


LDL Cholesterol, Calc   


 


Urine Appearance   


 


Urine Blood   


 


Ur Leukocyte Esterase   


 


Urine WBC   


 


Ur Squamous Epith Cells   


 


Urine Bacteria   


 


Hyaline Casts   


 


Urine Mucus   














  03/12/20 03/12/20 03/12/20





  08:35 09:01 10:20


 


Hgb   


 


Hct   


 


MCV   


 


MCH   


 


MCHC   


 


RDW   


 


Plt Count   


 


Lymphocytes #   


 


PT    12.7 H


 


INR    1.3 H


 


Sodium   


 


Chloride   


 


BUN   


 


Creatinine   


 


Glucose   


 


POC Glucose (mg/dL)  185 H  203 H 


 


Calcium   


 


Troponin I   


 


Total Protein   


 


Albumin   


 


LDL Cholesterol, Calc   


 


Urine Appearance   


 


Urine Blood   


 


Ur Leukocyte Esterase   


 


Urine WBC   


 


Ur Squamous Epith Cells   


 


Urine Bacteria   


 


Hyaline Casts   


 


Urine Mucus   














  03/12/20 03/12/20





  10:20 12:22


 


Hgb  


 


Hct  


 


MCV  


 


MCH  


 


MCHC  


 


RDW  


 


Plt Count  


 


Lymphocytes #  


 


PT  


 


INR  


 


Sodium  133 L 


 


Chloride  


 


BUN  44 H 


 


Creatinine  1.86 H 


 


Glucose  184 H 


 


POC Glucose (mg/dL)   185 H


 


Calcium  8.0 L 


 


Troponin I  


 


Total Protein  


 


Albumin  


 


LDL Cholesterol, Calc  


 


Urine Appearance  


 


Urine Blood  


 


Ur Leukocyte Esterase  


 


Urine WBC  


 


Ur Squamous Epith Cells  


 


Urine Bacteria  


 


Hyaline Casts  


 


Urine Mucus  














- Diagnostic Findings


Chest x-ray: image reviewed





Assessment and Plan


Plan: 











1 acute hypotension accompanied by APOORVA. anna RVR, currently converted back to 

normal sinus rhythm and the patient's blood pressure remains low and the 

patient's pressor dependent on norepinephrine infusion at 0.09 g per KG per 

minute.  She does have abnormal stress test with induced left ventricular 

myocardial ischemia involving the lateral wall.  Consider underlying coronary 

artery disease.


2 nonalcoholic liver cirrhosis and the patient is status post large volume 

paracentesis last paracentesis being done 03/10/2020


3 paroxysmal atrial fibrillation currently in normal sinus rhythm and the 

patient is on IV heparin 


4 positive troponins.  An underlying positive stress test about underlying 

coronary artery disease


5 liver cirrhosis with splenic sequestration and portal hypertension and mild 

coagulopathy


6 diabetes mellitus type 2


7 peripheral neuropathy


8 history of breast cancer with a previous lumpectomy on the left


9 mild thrombocytopenia


10 acute kidney injury, probably related to hypotension/large volume para

centesis.  Consider prerenal azotemia.  The patient is nonoliguric at this point

 in time.





Plan





Give the patient 5% albumin 1 if needed 2 and monitor the blood pressure in 

the urine output and gradually wean off pressors


Considered an arterial line for blood pressure monitoring


Continue IV Rocephin


Monitor the cardiac rhythm and echocardiogram has been noted


IV heparin


Monitor hematologic profile including the platelets


Cardiac catheterization later stage, once the patient's renal function st

abilizes.  For now we'll going to hydrate this patient with normal state rate of

 75 mL an hour.  We'll also give IV albumin.


Keep the patient ICU for further monitoring.  We'll continue to follow.
show

## 2024-03-20 NOTE — ED PROVIDER NOTE - CLINICAL SUMMARY MEDICAL DECISION MAKING FREE TEXT BOX
Patient is a 68-year-old history of diabetes, COPD on 2 L at home, presenting with 2 weeks of diarrhea. Patient on home O2.  Abdomen soft, nontender.  Diarrhea is nonbloody, no recent travel.  Do not suspect acute intra-abdominal pathology, abdomen nontender on exam.  Will get labs, electrolytes, UA, give fluids, reassess.  Will give patient GI follow-up. Patient is a 68-year-old history of diabetes, COPD on 2 L at home, presenting with 2 weeks of diarrhea. Patient on home O2.  Abdomen soft, nontender.  Diarrhea is nonbloody, no recent travel.  Do not suspect acute intra-abdominal pathology, abdomen nontender on exam.  No hx abx use, 4-5 episodes per day, do not suspect c diff. Will get labs, electrolytes, UA, give fluids, reassess.  Will give patient GI follow-up.

## 2024-03-20 NOTE — ED PROVIDER NOTE - NSFOLLOWUPINSTRUCTIONS_ED_ALL_ED_FT
You were evaluated in the Emergency Department for diarrhea.    Based on your evaluation:  There are no signs of emergency conditions requiring admission to the hospital on today's workup.  Based on the evaluation, a presumptive diagnosis was made, however, further evaluation may be required by your primary care physician or a specialist for a more definitive diagnosis.  Therefore, please follow-up as directed or return to the Emergency Department if your symptoms change or worsen.    We recommend that you:  1. See your primary care physician within the next 72 hours for follow up.  Bring a copy of your discharge paperwork (including any test results) to your doctor.  2. Stay hydrated.  3. Follow-up with the gastroenterologist. The hospital should call you to help set up an appointment. If you do not hear from someone by tomorrow, please call the number provided to schedule an appointment.      *** Return immediately if you have worsening symptoms, blood in the stool, fevers, abdominal pain, or any other new/concerning symptoms. ***

## 2024-03-20 NOTE — ED PROVIDER NOTE - OBJECTIVE STATEMENT
Patient is a 68-year-old history of diabetes, COPD on 2 L at home, presenting with 2 weeks of diarrhea.  Patient states hematemesis a few has diarrhea.  Has multiple episodes a day, approximately 4-5.  Describes it as watery, nonbloody nonblack.  Patient denies nausea or vomiting, however has not been eating much because she is afraid to have more diarrhea.  Denies abdominal pain, urinary symptoms, chest pain, shortness of breath, increased sputum production, recent travel.

## 2024-03-20 NOTE — ED PROVIDER NOTE - PROGRESS NOTE DETAILS
Holli Ybarra MD PGY3: UA negative. Fluids given. Patient continues to have diarrhea, minimal in ED. Will discharge.

## 2024-03-20 NOTE — ED PROVIDER NOTE - PHYSICAL EXAMINATION
GENERAL: no acute distress, non-toxic appearing  HEAD: normocephalic, atraumatic  HEENT: PERRLA, EOMI, normal conjunctiva, oral mucosa dry  CARDIAC: regular rate and rhythm, no appreciable murmurs  PULM: clear to ascultation bilaterally  GI: abdomen nondistended, soft, nontender, no guarding or rebound tenderness  : no CVA tenderness, no suprapubic tenderness  NEURO: alert and oriented x 3, normal speech, no gross neurologic deficit  MSK: no visible deformities  SKIN: no visible rashes, dry, well-perfused  PSYCH: appropriate mood and affect

## 2024-03-20 NOTE — ED PROVIDER NOTE - ATTENDING CONTRIBUTION TO CARE
Attending Emergency Medicine Physician- Kam Elmore MD, FACEP: In this physician's medical judgement based on clinical history and physical exam the patient's signs and symptoms lead to differential diagnoses which includes but is not limited to: non-infectious diarrhea, IBS, colitis   Historical features, symptoms, and clinical exam not consistent with: ischemic bowel  Labs were ordered and independently reviewed by me.  Appropriate medications for the patient's presenting complaints were ordered, and effects were reassessed.   History assisted by EMS  Will follow up on labs, therapeutics, imaging, reassess and disposition as clinically indicated.  *The above represents an initial assessment/impression. Please refer to my progress notes below for potential changes in patient clinical course*  Escalation to admission/observation was considered. However, patient better served with outpatient primary medical doctor and/or specialist and given strict return precautions and expectant management.     Patient  understands anticipatory guidance and was given strict return and follow up precautions. The patient has been informed of all concerning signs and symptoms to return to Emergency Department, the necessity to follow up with PMD/Clinic/follow up provided within 2-3 days was explained, and the patient reports understanding of above with capacity and insight if patient disposition is to be home.     Portions of this note have been documented using voice recognition software. As a result, errors may occur in the transcription process. Effort has been made to correct all grammatical and transcription error, but some may have been missed which may produce sporadic inaccurate transcription or nonsensical phrases.

## 2024-03-21 VITALS
OXYGEN SATURATION: 94 % | TEMPERATURE: 98 F | RESPIRATION RATE: 18 BRPM | HEART RATE: 81 BPM | SYSTOLIC BLOOD PRESSURE: 149 MMHG | DIASTOLIC BLOOD PRESSURE: 84 MMHG

## 2024-03-21 LAB
APPEARANCE UR: CLEAR — SIGNIFICANT CHANGE UP
BACTERIA # UR AUTO: NEGATIVE /HPF — SIGNIFICANT CHANGE UP
BILIRUB UR-MCNC: NEGATIVE — SIGNIFICANT CHANGE UP
CAST: 0 /LPF — SIGNIFICANT CHANGE UP (ref 0–4)
COLOR SPEC: YELLOW — SIGNIFICANT CHANGE UP
DIFF PNL FLD: NEGATIVE — SIGNIFICANT CHANGE UP
GLUCOSE UR QL: NEGATIVE MG/DL — SIGNIFICANT CHANGE UP
KETONES UR-MCNC: NEGATIVE MG/DL — SIGNIFICANT CHANGE UP
LEUKOCYTE ESTERASE UR-ACNC: ABNORMAL
NITRITE UR-MCNC: NEGATIVE — SIGNIFICANT CHANGE UP
PH UR: 6.5 — SIGNIFICANT CHANGE UP (ref 5–8)
PROT UR-MCNC: NEGATIVE MG/DL — SIGNIFICANT CHANGE UP
RBC CASTS # UR COMP ASSIST: 0 /HPF — SIGNIFICANT CHANGE UP (ref 0–4)
SP GR SPEC: 1 — SIGNIFICANT CHANGE UP (ref 1–1.03)
SQUAMOUS # UR AUTO: 1 /HPF — SIGNIFICANT CHANGE UP (ref 0–5)
UROBILINOGEN FLD QL: 0.2 MG/DL — SIGNIFICANT CHANGE UP (ref 0.2–1)
WBC UR QL: 0 /HPF — SIGNIFICANT CHANGE UP (ref 0–5)

## 2024-03-21 PROCEDURE — 84100 ASSAY OF PHOSPHORUS: CPT

## 2024-03-21 PROCEDURE — 80053 COMPREHEN METABOLIC PANEL: CPT

## 2024-03-21 PROCEDURE — 81001 URINALYSIS AUTO W/SCOPE: CPT

## 2024-03-21 PROCEDURE — 83735 ASSAY OF MAGNESIUM: CPT

## 2024-03-21 PROCEDURE — 99284 EMERGENCY DEPT VISIT MOD MDM: CPT

## 2024-03-21 PROCEDURE — 85025 COMPLETE CBC W/AUTO DIFF WBC: CPT

## 2024-03-21 PROCEDURE — 87086 URINE CULTURE/COLONY COUNT: CPT

## 2024-03-21 RX ORDER — SODIUM CHLORIDE 9 MG/ML
1000 INJECTION INTRAMUSCULAR; INTRAVENOUS; SUBCUTANEOUS ONCE
Refills: 0 | Status: COMPLETED | OUTPATIENT
Start: 2024-03-21 | End: 2024-03-21

## 2024-03-21 RX ADMIN — SODIUM CHLORIDE 1000 MILLILITER(S): 9 INJECTION INTRAMUSCULAR; INTRAVENOUS; SUBCUTANEOUS at 01:13

## 2024-03-21 NOTE — ED ADULT NURSE NOTE - NSFALLRISKINTERV_ED_ALL_ED
Assistance OOB with selected safe patient handling equipment if applicable/Assistance with ambulation/Communicate fall risk and risk factors to all staff, patient, and family/Monitor gait and stability/Provide visual cue: yellow wristband, yellow gown, etc/Reinforce activity limits and safety measures with patient and family/Call bell, personal items and telephone in reach/Instruct patient to call for assistance before getting out of bed/chair/stretcher/Non-slip footwear applied when patient is off stretcher/[place] to call system/Physically safe environment - no spills, clutter or unnecessary equipment/Purposeful Proactive Rounding/Room/bathroom lighting operational, light cord in reach

## 2024-03-21 NOTE — ED ADULT NURSE NOTE - OBJECTIVE STATEMENT
69 y/o F with PMH of COPD, on 2 L NC at home presents to ED complaining of diarrhea. Pt reports diarrhea for the past 2 weeks about 4-5 episodes a day, Describes it as watery, nonbloody nonblack. Pt reports she feels weak and dehydrated. Upon arrival, pt is A&OX4, satting well on 2 L NC. Abdomen is soft and nontender. Pt feels "bloated" Denies headache, dizziness, vision changes, chest pain, shortness of breath, abdominal pain, nausea, vomiting, fevers, chills, dysuria, hematuria, recent illness travel or fall.

## 2024-03-23 LAB
CULTURE RESULTS: SIGNIFICANT CHANGE UP
SPECIMEN SOURCE: SIGNIFICANT CHANGE UP

## 2024-04-22 ENCOUNTER — APPOINTMENT (OUTPATIENT)
Dept: PULMONOLOGY | Facility: CLINIC | Age: 69
End: 2024-04-22
Payer: MEDICARE

## 2024-04-22 VITALS
TEMPERATURE: 97.4 F | DIASTOLIC BLOOD PRESSURE: 71 MMHG | OXYGEN SATURATION: 93 % | BODY MASS INDEX: 26.22 KG/M2 | WEIGHT: 142.5 LBS | SYSTOLIC BLOOD PRESSURE: 124 MMHG | RESPIRATION RATE: 18 BRPM | HEART RATE: 80 BPM | HEIGHT: 62 IN

## 2024-04-22 DIAGNOSIS — K21.9 GASTRO-ESOPHAGEAL REFLUX DISEASE W/OUT ESOPHAGITIS: ICD-10-CM

## 2024-04-22 DIAGNOSIS — J44.9 CHRONIC OBSTRUCTIVE PULMONARY DISEASE, UNSPECIFIED: ICD-10-CM

## 2024-04-22 DIAGNOSIS — J31.0 CHRONIC RHINITIS: ICD-10-CM

## 2024-04-22 DIAGNOSIS — R06.02 SHORTNESS OF BREATH: ICD-10-CM

## 2024-04-22 DIAGNOSIS — Z01.811 ENCOUNTER FOR PREPROCEDURAL RESPIRATORY EXAMINATION: ICD-10-CM

## 2024-04-22 DIAGNOSIS — J96.11 CHRONIC RESPIRATORY FAILURE WITH HYPOXIA: ICD-10-CM

## 2024-04-22 DIAGNOSIS — R91.8 OTHER NONSPECIFIC ABNORMAL FINDING OF LUNG FIELD: ICD-10-CM

## 2024-04-22 DIAGNOSIS — R06.83 SNORING: ICD-10-CM

## 2024-04-22 PROCEDURE — 94010 BREATHING CAPACITY TEST: CPT

## 2024-04-22 PROCEDURE — 94727 GAS DIL/WSHOT DETER LNG VOL: CPT

## 2024-04-22 PROCEDURE — G2211 COMPLEX E/M VISIT ADD ON: CPT | Mod: NC,1L

## 2024-04-22 PROCEDURE — 95012 NITRIC OXIDE EXP GAS DETER: CPT

## 2024-04-22 PROCEDURE — 94729 DIFFUSING CAPACITY: CPT

## 2024-04-22 PROCEDURE — 99214 OFFICE O/P EST MOD 30 MIN: CPT | Mod: 25

## 2024-04-22 RX ORDER — IPRATROPIUM BROMIDE AND ALBUTEROL 20; 100 UG/1; UG/1
20-100 SPRAY, METERED RESPIRATORY (INHALATION)
Qty: 3 | Refills: 1 | Status: ACTIVE | COMMUNITY
Start: 2024-04-22 | End: 1900-01-01

## 2024-04-22 NOTE — ASSESSMENT
[FreeTextEntry1] : Ms. GARCIA  is a 68 year old female with a history of GERD, COPD, Gallstone s/p gall bladder removal, chronic respiratory failure on oxygen, abnormal CT Chest (nodule 8/2023) who now comes to the office for a follow-up pulmonary evaluation for SOB, COPD, Emphysema, Chronic Respiratory Failure, Allergies/Rhinitis, GERD, Lung CA Screening / Abnormal CT (nodule), ?JORDEN- pending colonoscopy  *************************PRE-OP CLEARANCE FOR COLONOSCOPY (5/7/2024)*************************************** -at this point in time there are no absolute pulmonary contraindications to go forward with the planned procedure -at the time of surgery she should have optimal pain control, incentive spirometry, early ambulation, DVT and GI prophylaxis    Her shortness of breath is multifactorial due to: -poor mechanics of breathing -out of shape -overweight -pulmonary disease   -COPD   -Emphysema   -Chronic Respiratory Failure  -?cardiac disease   problem 1: COPD / Emphysema  -Trelegy 200 1 puff QD -add Combivent 1 inhalation Q6H, PRN -Theophylline 400 mg QD -s/p Alpha 1 Antitrypsin levels (WNL) -Inhaler technique reviewed as well as oral hygiene techniques reviewed with patient. Avoidance of cold air, extremes of temperature, rescue inhaler should be used before exercise. Order of medication reviewed with patient. Recommended use of a cool mist humidifier in the bedroom. -COPD is a progressive disease and although it cant be cured , appropriate management can slow its progression, reduce frequency and severity of exacerbations, and improve symptoms and the patient quality of life. Hospitalizations are the greatest contributor to the total COPD costs and account for up to 87% of total COPD related costs. Exacerbations are the main cause of admissions and subsequently account for the 40-75% of COPD costs. Inhaled maintenance therapy reduces the incidence of exacerbations in patients with stable COPD. Incorrect inhaler use and nonadherence are major obstacles to achieving COPD treatment goals. Many COPD patients have challenges (impaired inhalation, limited dexterity, reduced cognition: that limit their ability to correctly use their COPD treatment devices resulting in reduced symptom control. Of most importance is smoking cessation and early intervention with respiratory illnesses and contemplation for pulmonary rehab to enhance quality of life.   problem 2: Allergies / Sinus / Rhinitis -s/p Blood work: asthma panel, food IgE panel, IgE level, eosinophil level, vitamin D level (low) -continue Olopatadine 0.6% 1 sniff BID Environmental measures for allergies were encouraged including mattress and pillow covers, air purifier, and environmental controls.  Problem 2A: Low Vitamin D -on Rx -Low vitamin D levels have been associated with asthma exacerbations and increased allergic symptoms. The goal, based on recent information, is maintaining levels between 50-70 and low normal is 30. Recommended 50,000 units every two weeks to once a month depending on the level.    Problem 3: GERD -continue Omeprazole 40 mg QAM, pre-meal -Rule of 2s: avoid eating too much, eating too late, eating too spicy, eating two hours before bed. -Things to avoid including overeating, spicy foods, tight clothing, eating within three hours of bed, this list is not all inclusive. -For treatment of reflux, possible options discussed including diet control, H2 blockers, PPIs, as well as coating motility agents discussed as treatment options. Timing of meals and proximity of last meal to sleep were discussed. If symptoms persist, a formal gastrointestinal evaluation is needed.   Problem 4: Chronic Respiratory Failure on 2L of Oxygen -recommended Pulmonary Rehab   Problem 5: Abnormal CT (5mm nodule - indeterminate 8/2023) / Lung CA Screening -s/p follow up CT 10/2023, next 4/2024 -complete NODIFY CAT scans are the only radiological modality to identify abnormalities w/in the lungs with regards to nodules/masses/lymph nodes. Risks, benefits were reviewed in detail. The guidelines for abnormalities include follow up CT scans at various intervals which could range from 6 weeks to 1 year intervals. If there is a change for the worse then consideration for a biopsy will be considered if you are a candidate. Second opinion evaluation with a thoracic surgeon or an interventional radiologist could be offered. Lung cancer screening is recommended for people between the ages of 50 and 80 with prior 30+ pack year smoking histories. There is irrefutable evidence for realization of lung cancer screening based on two large randomized control trials demonstrating relative reduction in lung cancer mortality for patients undergoing low-dose CT scanning. Risks and benefits reviewed with the patient.  Problem 6: poor sleep ?JORDEN ([place]) -get home sleep study Sleep apnea is associated with adverse clinical consequences which can affect most organ systems. Cardiovascular disease risk includes arrhythmias, atrial fibrillation, hypertension, coronary artery disease, and stroke. Metabolic disorders include diabetes type 2, non-alcoholic fatty liver disease. Mood disorder especially depression; and cognitive decline especially in the elderly. Associations with chronic reflux/Shaun's esophagus some but now all inclusive. -Reasons include arousal consistent with hypopnea; respiratory events most prominent in REM sleep or supine position; therefore sleep staging and body position are important for accurate diagnosis and estimation of AHI.   problem 7: cardiac disease -recommended to continue to follow up with Cardiologist (Jayden) pending    problem 8: poor breathing mechanics -Proper breathing techniques were reviewed with an emphasis of exhalation. Patient instructed to breath in for 1 second and out for four seconds. Patient was encouraged to not talk while walking.   problem 9: overweight/ out of shape -Weight loss, exercise, and diet control were discussed and are highly encouraged. Treatment options are given such as, aqua therapy, and contacting a nutritionist. Recommended to use the elliptical, stationary bike, less use of treadmill.   problem 10: health maintenance -recommended RSV vaccine in the Fall for anyone over the age of 60 -recommended yearly flu shot after October 15, 2023 -recommended strep pneumonia vaccines: Prevnar-20 vaccine, followed by Pneumo vaccine 23 one year following after 65 years old. -recommended early intervention for Upper Respiratory Infections (URIs) -recommended regular osteoporosis evaluations -recommended early dermatological evaluations -recommended after the age of 50 to the age of 70, colonoscopy every 5 years   F/P in 3-4 months  She is encouraged to call with any changes, concerns, or questions

## 2024-04-22 NOTE — REASON FOR VISIT
[Follow-Up] : a follow-up visit [TextBox_44] : SOB, COPD, Emphysema, Chronic Respiratory Failure, Allergies/Rhinitis, GERD, Lung CA Screening / Abnormal CT (nodule), JORDEN

## 2024-04-22 NOTE — PHYSICAL EXAM
[No Acute Distress] : no acute distress [Normal Oropharynx] : normal oropharynx [III] : Mallampati Class: III [Normal Appearance] : normal appearance [No Neck Mass] : no neck mass [Normal Rate/Rhythm] : normal rate/rhythm [Normal S1, S2] : normal s1, s2 [No Murmurs] : no murmurs [No Resp Distress] : no resp distress [No Abnormalities] : no abnormalities [Benign] : benign [Normal Gait] : normal gait [No Clubbing] : no clubbing [No Cyanosis] : no cyanosis [No Edema] : no edema [FROM] : FROM [Normal Color/ Pigmentation] : normal color/ pigmentation [No Focal Deficits] : no focal deficits [Oriented x3] : oriented x3 [Normal Affect] : normal affect [TextBox_2] : portable O2  [TextBox_68] : I:E ratio 1:3; mild expiratory crackles at the bases

## 2024-04-22 NOTE — HISTORY OF PRESENT ILLNESS
[TextBox_4] : Ms. GARCIA  is a 68 year old female presenting to the office today for a follow-up pulmonary evaluation. Her chief complaint is  -she notes s/p cholecystectomy and B/L foot fractures -she notes diarrhea, for which she has a pending colonoscopy (5/7/2024) -she notes sleeping for 8-10 hours, which is enough for her -she notes s/p hospitalization for dehydration -she notes she's on 6,000 IU vitamin D3  -she denies any headaches, nausea, emesis, fever, chills, sweats, chest pain, chest pressure, coughing, wheezing, palpitations, constipation, dysphagia, vertigo, arthralgias, myalgias, leg swelling, itchy eyes, itchy ears, heartburn, reflux, or sour taste in the mouth.

## 2024-04-22 NOTE — ADDENDUM
[FreeTextEntry1] : Documented by Diana Larios acting as a scribe for Dr. Willam Jones on 04/22/2024. All medical record entries made by the Scribe were at my, Dr. Willam Jones's, direction and personally dictated by me on 04/22/2024. I have reviewed the chart and agree that the record accurately reflects my personal performance of the history, physical exam, assessment and plan. I have also personally directed, reviewed, and agree with the discharge instructions.

## 2024-04-22 NOTE — PROCEDURE
[FreeTextEntry1] : PFTs revealed mild obstructive dysfunction at mid-low volumes; FEV1 was  1.72L, which is 80.2% of predicted; normal flow volume loop. PFTs were performed to evaluate for asthma  Full PFT reveals mild-moderate obstructive dysfunction at mid-low volumes; FEV1 was 1.78L which is 85% of predicted; normal lung volumes; moderately reduced diffusion at 8.8, which is 48% of predicted; normal flow volume loop. PFTs were performed to evaluate for SOB  Chest CT (10/19/2023) revealed severe central lobular emphysema; opacification R apex likely apical scarring. Focal atelectasis RML. Prominent coronary artery calcifications.  FENO was 17; a normal value being less than 25 Fractional exhaled nitric oxide (FENO) is regarded as a simple, noninvasive method for assessing eosinophilic airway inflammation. Produced by a variety of cells within the lung, nitric oxide (NO) concentrations are generally low in healthy individuals. However, high concentrations of NO appear to be involved in nonspecific host defense mechanisms and chronic inflammatory diseases such as asthma. The American Thoracic Society (ATS) therefore has recommended using FENO to aid in the diagnosis and monitoring of eosinophilic airway inflammation and asthma, and for identifying steroid responsive individuals whose chronic respiratory symptoms may be caused by airway inflammation.

## 2024-04-27 ENCOUNTER — EMERGENCY (EMERGENCY)
Facility: HOSPITAL | Age: 69
LOS: 1 days | Discharge: HOME | End: 2024-04-27
Attending: EMERGENCY MEDICINE
Payer: COMMERCIAL

## 2024-04-27 VITALS
TEMPERATURE: 98 F | SYSTOLIC BLOOD PRESSURE: 115 MMHG | RESPIRATION RATE: 20 BRPM | OXYGEN SATURATION: 95 % | WEIGHT: 139.99 LBS | HEIGHT: 62 IN | DIASTOLIC BLOOD PRESSURE: 81 MMHG | HEART RATE: 110 BPM

## 2024-04-27 DIAGNOSIS — Z96.641 PRESENCE OF RIGHT ARTIFICIAL HIP JOINT: Chronic | ICD-10-CM

## 2024-04-27 LAB
ALBUMIN SERPL ELPH-MCNC: 6.3 G/DL — HIGH (ref 3.3–5)
ALP SERPL-CCNC: 75 U/L — SIGNIFICANT CHANGE UP (ref 40–120)
ALT FLD-CCNC: 29 U/L — SIGNIFICANT CHANGE UP (ref 10–45)
ANION GAP SERPL CALC-SCNC: 20 MMOL/L — HIGH (ref 5–17)
AST SERPL-CCNC: 37 U/L — SIGNIFICANT CHANGE UP (ref 10–40)
BASE EXCESS BLDV CALC-SCNC: -5.4 MMOL/L — LOW (ref -2–3)
BASOPHILS # BLD AUTO: 0.06 K/UL — SIGNIFICANT CHANGE UP (ref 0–0.2)
BASOPHILS NFR BLD AUTO: 0.5 % — SIGNIFICANT CHANGE UP (ref 0–2)
BILIRUB SERPL-MCNC: 0.4 MG/DL — SIGNIFICANT CHANGE UP (ref 0.2–1.2)
BUN SERPL-MCNC: 21 MG/DL — SIGNIFICANT CHANGE UP (ref 7–23)
CA-I SERPL-SCNC: 1.29 MMOL/L — SIGNIFICANT CHANGE UP (ref 1.15–1.33)
CALCIUM SERPL-MCNC: 11.1 MG/DL — HIGH (ref 8.4–10.5)
CHLORIDE BLDV-SCNC: 101 MMOL/L — SIGNIFICANT CHANGE UP (ref 96–108)
CHLORIDE SERPL-SCNC: 97 MMOL/L — SIGNIFICANT CHANGE UP (ref 96–108)
CO2 BLDV-SCNC: 23 MMOL/L — SIGNIFICANT CHANGE UP (ref 22–26)
CO2 SERPL-SCNC: 16 MMOL/L — LOW (ref 22–31)
CREAT SERPL-MCNC: 1.18 MG/DL — SIGNIFICANT CHANGE UP (ref 0.5–1.3)
EGFR: 50 ML/MIN/1.73M2 — LOW
EOSINOPHIL # BLD AUTO: 0.16 K/UL — SIGNIFICANT CHANGE UP (ref 0–0.5)
EOSINOPHIL NFR BLD AUTO: 1.2 % — SIGNIFICANT CHANGE UP (ref 0–6)
GAS PNL BLDV: 130 MMOL/L — LOW (ref 136–145)
GAS PNL BLDV: SIGNIFICANT CHANGE UP
GLUCOSE BLDV-MCNC: 217 MG/DL — HIGH (ref 70–99)
GLUCOSE SERPL-MCNC: 205 MG/DL — HIGH (ref 70–99)
HCO3 BLDV-SCNC: 21 MMOL/L — LOW (ref 22–29)
HCT VFR BLD CALC: 55 % — HIGH (ref 34.5–45)
HCT VFR BLDA CALC: 53 % — HIGH (ref 34.5–46.5)
HGB BLD CALC-MCNC: 17.8 G/DL — HIGH (ref 11.7–16.1)
HGB BLD-MCNC: 17.6 G/DL — HIGH (ref 11.5–15.5)
IMM GRANULOCYTES NFR BLD AUTO: 0.4 % — SIGNIFICANT CHANGE UP (ref 0–0.9)
LACTATE BLDV-MCNC: 2.8 MMOL/L — HIGH (ref 0.5–2)
LIDOCAIN IGE QN: 24 U/L — SIGNIFICANT CHANGE UP (ref 7–60)
LYMPHOCYTES # BLD AUTO: 0.9 K/UL — LOW (ref 1–3.3)
LYMPHOCYTES # BLD AUTO: 7 % — LOW (ref 13–44)
MCHC RBC-ENTMCNC: 28.9 PG — SIGNIFICANT CHANGE UP (ref 27–34)
MCHC RBC-ENTMCNC: 32 GM/DL — SIGNIFICANT CHANGE UP (ref 32–36)
MCV RBC AUTO: 90.5 FL — SIGNIFICANT CHANGE UP (ref 80–100)
MONOCYTES # BLD AUTO: 0.79 K/UL — SIGNIFICANT CHANGE UP (ref 0–0.9)
MONOCYTES NFR BLD AUTO: 6.1 % — SIGNIFICANT CHANGE UP (ref 2–14)
NEUTROPHILS # BLD AUTO: 10.94 K/UL — HIGH (ref 1.8–7.4)
NEUTROPHILS NFR BLD AUTO: 84.8 % — HIGH (ref 43–77)
NRBC # BLD: 0 /100 WBCS — SIGNIFICANT CHANGE UP (ref 0–0)
PCO2 BLDV: 44 MMHG — HIGH (ref 39–42)
PH BLDV: 7.29 — LOW (ref 7.32–7.43)
PLATELET # BLD AUTO: 446 K/UL — HIGH (ref 150–400)
PO2 BLDV: 32 MMHG — SIGNIFICANT CHANGE UP (ref 25–45)
POTASSIUM BLDV-SCNC: 4.4 MMOL/L — SIGNIFICANT CHANGE UP (ref 3.5–5.1)
POTASSIUM SERPL-MCNC: 4.5 MMOL/L — SIGNIFICANT CHANGE UP (ref 3.5–5.3)
POTASSIUM SERPL-SCNC: 4.5 MMOL/L — SIGNIFICANT CHANGE UP (ref 3.5–5.3)
PROT SERPL-MCNC: 9.3 G/DL — HIGH (ref 6–8.3)
RBC # BLD: 6.08 M/UL — HIGH (ref 3.8–5.2)
RBC # FLD: 14.7 % — HIGH (ref 10.3–14.5)
SAO2 % BLDV: 57.2 % — LOW (ref 67–88)
SODIUM SERPL-SCNC: 133 MMOL/L — LOW (ref 135–145)
WBC # BLD: 12.9 K/UL — HIGH (ref 3.8–10.5)
WBC # FLD AUTO: 12.9 K/UL — HIGH (ref 3.8–10.5)

## 2024-04-27 PROCEDURE — 71045 X-RAY EXAM CHEST 1 VIEW: CPT | Mod: 26

## 2024-04-27 PROCEDURE — 99285 EMERGENCY DEPT VISIT HI MDM: CPT

## 2024-04-27 RX ORDER — FAMOTIDINE 10 MG/ML
20 INJECTION INTRAVENOUS ONCE
Refills: 0 | Status: COMPLETED | OUTPATIENT
Start: 2024-04-27 | End: 2024-04-27

## 2024-04-27 RX ORDER — SODIUM CHLORIDE 9 MG/ML
1000 INJECTION INTRAMUSCULAR; INTRAVENOUS; SUBCUTANEOUS ONCE
Refills: 0 | Status: COMPLETED | OUTPATIENT
Start: 2024-04-27 | End: 2024-04-27

## 2024-04-27 RX ORDER — ONDANSETRON 8 MG/1
4 TABLET, FILM COATED ORAL ONCE
Refills: 0 | Status: COMPLETED | OUTPATIENT
Start: 2024-04-27 | End: 2024-04-27

## 2024-04-27 RX ADMIN — FAMOTIDINE 20 MILLIGRAM(S): 10 INJECTION INTRAVENOUS at 22:13

## 2024-04-27 RX ADMIN — SODIUM CHLORIDE 1000 MILLILITER(S): 9 INJECTION INTRAMUSCULAR; INTRAVENOUS; SUBCUTANEOUS at 22:13

## 2024-04-27 RX ADMIN — ONDANSETRON 4 MILLIGRAM(S): 8 TABLET, FILM COATED ORAL at 22:13

## 2024-04-27 RX ADMIN — SODIUM CHLORIDE 1000 MILLILITER(S): 9 INJECTION INTRAMUSCULAR; INTRAVENOUS; SUBCUTANEOUS at 23:21

## 2024-04-27 NOTE — ED PROVIDER NOTE - PROGRESS NOTE DETAILS
pt signed out, eval for loose nb stools, pt described "can't control" BMs but she describes needing to go urgently, she has normal neuro exam, tolerating PO, no antibiotics at this time as low suspicion, pt has IBS and describes similar episodes in past, multiple in depth dw pt about ddx / tx and she feels comfortable w/ dc home and close outtp fu.  -- Omari Becerra MD

## 2024-04-27 NOTE — ED PROVIDER NOTE - DATE/TIME 1
Patient arrives with complaint of right leg pain from a fall at 1130 this morning, states that she wearing heels and it got caught, states that she fell on her right leg on concrete, has been ambulatory post fall  
28-Apr-2024 04:44

## 2024-04-27 NOTE — ED PROVIDER NOTE - NSFOLLOWUPINSTRUCTIONS_ED_ALL_ED_FT
See your Primary Doctor and Gastroenterologist next week for follow up -- call to discuss.    Stay well hydrated, eat/drink smaller amounts more frequently, continue current medications.    Keep "Symptom Journal" -- diet/food, stools, pain, etc. to discuss with your doctors.    See DIARRHEA information and return instructions given to you.    Seek immediate medical care for new/worsening symptoms/concerns.

## 2024-04-27 NOTE — ED PROVIDER NOTE - CLINICAL SUMMARY MEDICAL DECISION MAKING FREE TEXT BOX
68-year-old female with multiple medical issues presented with nausea, vomiting, epigastric pain, diarrhea, for the last 3 to 4 weeks , she cannot keep anything down and feels very dehydrated, scheduled for colonoscopy in 2 weeks   in no acute distress looks dehydrated, will obtain blood work IV hydration CAT scan of the abdomen to rule out infectious colitis reassess ZR

## 2024-04-27 NOTE — ED PROVIDER NOTE - CONSTITUTIONAL, MLM
normal... looks sick awake, alert, oriented to person, place, time/situation and in no apparent distress.

## 2024-04-27 NOTE — ED PROVIDER NOTE - PATIENT PORTAL LINK FT
You can access the FollowMyHealth Patient Portal offered by [facility] by registering at the following website: [email or web address]. By joining [facility]’s FollowMyHealth portal, you will also be able to view your health information using other applications (apps) compatible with our system.

## 2024-04-27 NOTE — ED PROVIDER NOTE - OBJECTIVE STATEMENT
68-year-old female with a history of diabetes on metformin, history of COPD, hypertension, hyperlipidemia, presented with nausea, vomiting, diarrhea, inability to keep anything down even fluids for the last  3/4 weeks , she denies any blood in the stool, no fever no chills, no recent antibiotics, no recent traveling, she was seen by GI doctor who recommended to have a colonoscopy and she is scheduled for that in 2 weeks for

## 2024-04-27 NOTE — ED ADULT TRIAGE NOTE - HOW PATIENT ADDRESSED, PROFILE
Para el uso de fiebre/dolor:  Tylenol = Acetaminofén (concentración para niños 160 mg/5 ml) 7.5 ml cada 6 horas según sea necesario para la fiebre o el dolor [((16 kg kg x 15 mg/kg) x 5 ml)/160]  Motrin = Ibuprofeno (concentración para niños 100 mg/5 ml) 8 ml cada 6 horas según sea necesario para la fiebre o el dolor [((16 kg kg x 10 mg/kg) x 5 ml)/100]  Puedes alternar los dos medicamentos cada 3hrs     Plan de seguimiento:  - Seguimiento con: médico de atención primaria dentro de 3 a 5 días  - Seguimiento para pruebas y/o evaluaciones adicionales según las indicaciones de brown médico de atención primaria    Regrese al Departamento de Emergencias por síntomas que incluyen, entre otros: dolor de silver intenso, dificultad para respirar o dolor en el pecho, vómitos con incapacidad para retener líquidos, fiebre superior a 100.4 °F telma más de 7 días seguidos, mareos, desmayo /desmayo/pérdida del conocimiento, síntomas que persisten más de 14 días u otros síntomas preocupantes.   Jina

## 2024-04-27 NOTE — ED ADULT NURSE NOTE - NSFALLUNIVINTERV_ED_ALL_ED
Bed/Stretcher in lowest position, wheels locked, appropriate side rails in place/Call bell, personal items and telephone in reach/Instruct patient to call for assistance before getting out of bed/chair/stretcher/Non-slip footwear applied when patient is off stretcher/[place] to call system/Physically safe environment - no spills, clutter or unnecessary equipment/Purposeful proactive rounding/Room/bathroom lighting operational, light cord in reach

## 2024-04-27 NOTE — ED ADULT NURSE NOTE - OBJECTIVE STATEMENT
Pt is 67 y/o female presenting to the ED via EMS c/o n/v/d. Pt reports that she has had non-bloody diarrhea x3 weeks. As per pt, followed w/ GI and has colonoscopy scheduled. Pt denies recent abx use. x2 days pt has reported increased nausea w/ non-bloody emesis and unable to tolerate PO. PMH of HLD, COPD, on prn 2L O2 @ home, and DM on metformin. Upon assessment, pt AxOx3, sitting up in stretcher and speaking in full sentences. Breathing spontaneously and unlabored, >95% RA. Abdomen soft and nontender to palpation. Pt moves all extremities w/ = strength and ambulates w/o difficultly. Skin is warm, dry, and intact w/ + peripheral pulses. Pt denies SOB, chest pain, dizziness, vision changes, chills, and fever. Safety and comfort measures provided- bed in lowest position, locked, and blanket given.

## 2024-04-28 VITALS — HEART RATE: 84 BPM | RESPIRATION RATE: 14 BRPM | OXYGEN SATURATION: 92 %

## 2024-04-28 LAB
BASE EXCESS BLDV CALC-SCNC: -6.7 MMOL/L — LOW (ref -2–3)
BASOPHILS # BLD AUTO: 0.08 K/UL — SIGNIFICANT CHANGE UP (ref 0–0.2)
BASOPHILS NFR BLD AUTO: 0.9 % — SIGNIFICANT CHANGE UP (ref 0–2)
CA-I SERPL-SCNC: 1.21 MMOL/L — SIGNIFICANT CHANGE UP (ref 1.15–1.33)
CHLORIDE BLDV-SCNC: 103 MMOL/L — SIGNIFICANT CHANGE UP (ref 96–108)
CO2 BLDV-SCNC: 22 MMOL/L — SIGNIFICANT CHANGE UP (ref 22–26)
EOSINOPHIL # BLD AUTO: 0 K/UL — SIGNIFICANT CHANGE UP (ref 0–0.5)
EOSINOPHIL NFR BLD AUTO: 0 % — SIGNIFICANT CHANGE UP (ref 0–6)
GAS PNL BLDV: 131 MMOL/L — LOW (ref 136–145)
GAS PNL BLDV: SIGNIFICANT CHANGE UP
GAS PNL BLDV: SIGNIFICANT CHANGE UP
GI PCR PANEL: DETECTED
GIANT PLATELETS BLD QL SMEAR: PRESENT — SIGNIFICANT CHANGE UP
GLUCOSE BLDV-MCNC: 121 MG/DL — HIGH (ref 70–99)
HCO3 BLDV-SCNC: 21 MMOL/L — LOW (ref 22–29)
HCT VFR BLD CALC: 48 % — HIGH (ref 34.5–45)
HCT VFR BLDA CALC: 46 % — SIGNIFICANT CHANGE UP (ref 34.5–46.5)
HGB BLD CALC-MCNC: 15.4 G/DL — SIGNIFICANT CHANGE UP (ref 11.7–16.1)
HGB BLD-MCNC: 14.9 G/DL — SIGNIFICANT CHANGE UP (ref 11.5–15.5)
LACTATE BLDV-MCNC: 1.5 MMOL/L — SIGNIFICANT CHANGE UP (ref 0.5–2)
LYMPHOCYTES # BLD AUTO: 0.38 K/UL — LOW (ref 1–3.3)
LYMPHOCYTES # BLD AUTO: 4.4 % — LOW (ref 13–44)
MANUAL SMEAR VERIFICATION: SIGNIFICANT CHANGE UP
MCHC RBC-ENTMCNC: 28.6 PG — SIGNIFICANT CHANGE UP (ref 27–34)
MCHC RBC-ENTMCNC: 31 GM/DL — LOW (ref 32–36)
MCV RBC AUTO: 92.1 FL — SIGNIFICANT CHANGE UP (ref 80–100)
MONOCYTES # BLD AUTO: 0.38 K/UL — SIGNIFICANT CHANGE UP (ref 0–0.9)
MONOCYTES NFR BLD AUTO: 4.3 % — SIGNIFICANT CHANGE UP (ref 2–14)
NEUTROPHILS # BLD AUTO: 7.9 K/UL — HIGH (ref 1.8–7.4)
NEUTROPHILS NFR BLD AUTO: 90.4 % — HIGH (ref 43–77)
PCO2 BLDV: 49 MMHG — HIGH (ref 39–42)
PH BLDV: 7.24 — LOW (ref 7.32–7.43)
PLAT MORPH BLD: NORMAL — SIGNIFICANT CHANGE UP
PLATELET # BLD AUTO: 342 K/UL — SIGNIFICANT CHANGE UP (ref 150–400)
PLATELET COUNT - ESTIMATE: NORMAL — SIGNIFICANT CHANGE UP
PO2 BLDV: 24 MMHG — LOW (ref 25–45)
POTASSIUM BLDV-SCNC: 6 MMOL/L — HIGH (ref 3.5–5.1)
RBC # BLD: 5.21 M/UL — HIGH (ref 3.8–5.2)
RBC # FLD: 14.8 % — HIGH (ref 10.3–14.5)
RBC BLD AUTO: NORMAL — SIGNIFICANT CHANGE UP
RV RNA STL NAA+PROBE: DETECTED
SAO2 % BLDV: 38.4 % — LOW (ref 67–88)
WBC # BLD: 8.74 K/UL — SIGNIFICANT CHANGE UP (ref 3.8–10.5)
WBC # FLD AUTO: 8.74 K/UL — SIGNIFICANT CHANGE UP (ref 3.8–10.5)

## 2024-04-28 PROCEDURE — 82330 ASSAY OF CALCIUM: CPT

## 2024-04-28 PROCEDURE — 84132 ASSAY OF SERUM POTASSIUM: CPT

## 2024-04-28 PROCEDURE — 85014 HEMATOCRIT: CPT

## 2024-04-28 PROCEDURE — 82947 ASSAY GLUCOSE BLOOD QUANT: CPT

## 2024-04-28 PROCEDURE — 96374 THER/PROPH/DIAG INJ IV PUSH: CPT | Mod: XU

## 2024-04-28 PROCEDURE — 85025 COMPLETE CBC W/AUTO DIFF WBC: CPT

## 2024-04-28 PROCEDURE — 84295 ASSAY OF SERUM SODIUM: CPT

## 2024-04-28 PROCEDURE — 85018 HEMOGLOBIN: CPT

## 2024-04-28 PROCEDURE — 87507 IADNA-DNA/RNA PROBE TQ 12-25: CPT

## 2024-04-28 PROCEDURE — 99285 EMERGENCY DEPT VISIT HI MDM: CPT | Mod: 25

## 2024-04-28 PROCEDURE — 96375 TX/PRO/DX INJ NEW DRUG ADDON: CPT

## 2024-04-28 PROCEDURE — 74177 CT ABD & PELVIS W/CONTRAST: CPT | Mod: MC

## 2024-04-28 PROCEDURE — 74177 CT ABD & PELVIS W/CONTRAST: CPT | Mod: 26,MC

## 2024-04-28 PROCEDURE — 93005 ELECTROCARDIOGRAM TRACING: CPT

## 2024-04-28 PROCEDURE — 80053 COMPREHEN METABOLIC PANEL: CPT

## 2024-04-28 PROCEDURE — 82435 ASSAY OF BLOOD CHLORIDE: CPT

## 2024-04-28 PROCEDURE — 82803 BLOOD GASES ANY COMBINATION: CPT

## 2024-04-28 PROCEDURE — 83690 ASSAY OF LIPASE: CPT

## 2024-04-28 PROCEDURE — 72132 CT LUMBAR SPINE W/DYE: CPT | Mod: 26,MC

## 2024-04-28 PROCEDURE — 71045 X-RAY EXAM CHEST 1 VIEW: CPT

## 2024-04-28 PROCEDURE — 83605 ASSAY OF LACTIC ACID: CPT

## 2024-07-29 ENCOUNTER — RX RENEWAL (OUTPATIENT)
Age: 69
End: 2024-07-29

## 2024-08-22 ENCOUNTER — OUTPATIENT (OUTPATIENT)
Dept: OUTPATIENT SERVICES | Facility: HOSPITAL | Age: 69
LOS: 1 days | Discharge: HOME | End: 2024-08-22
Payer: MEDICARE

## 2024-08-22 VITALS
DIASTOLIC BLOOD PRESSURE: 80 MMHG | RESPIRATION RATE: 18 BRPM | SYSTOLIC BLOOD PRESSURE: 122 MMHG | TEMPERATURE: 97 F | HEART RATE: 80 BPM | WEIGHT: 134.92 LBS | OXYGEN SATURATION: 93 % | HEIGHT: 62 IN

## 2024-08-22 VITALS
SYSTOLIC BLOOD PRESSURE: 129 MMHG | RESPIRATION RATE: 12 BRPM | DIASTOLIC BLOOD PRESSURE: 63 MMHG | OXYGEN SATURATION: 90 % | HEART RATE: 77 BPM

## 2024-08-22 DIAGNOSIS — Z12.11 ENCOUNTER FOR SCREENING FOR MALIGNANT NEOPLASM OF COLON: ICD-10-CM

## 2024-08-22 DIAGNOSIS — Z96.641 PRESENCE OF RIGHT ARTIFICIAL HIP JOINT: Chronic | ICD-10-CM

## 2024-08-22 PROCEDURE — 88305 TISSUE EXAM BY PATHOLOGIST: CPT | Mod: 26

## 2024-08-22 NOTE — ASU PREOP CHECKLIST - RESPIRATORY RATE (BREATHS/MIN)
Caller: Cale  Reason for Reschedule/Cancellation (please be detailed, any staff messages or encounters to note?): needs another date      Prior to reschedule please review:  Ordering Provider: Darrian  Sedation per order: CS  Does patient have any ASC Exclusions, please identify?: no      Notes on Cancelled Procedure:  Procedure: Lower Endoscopy [Colonoscopy]   Date: 9/1  Location: [facility], [place]; [facility] [address] [place] [address]  Surgeon: Salo      Rescheduled: Yes  Procedure: Lower Endoscopy [Colonoscopy]   Date: 10/12  Location: [facility], [place]; [address] [place] [address]  Surgeon: Zohaib  Sedation Level Scheduled  CS,  Reason for Sedation Level order  Prep/Instructions updated and sent: y       Send In - basket message to Panc - Panfilo Pool if EUS  procedure is canceled or rescheduled: [ N/A, YES or NO] n/a              18

## 2024-08-28 LAB — SURGICAL PATHOLOGY STUDY: SIGNIFICANT CHANGE UP

## 2025-01-13 NOTE — H&P ADULT - NSHPLANGLIMITEDENGLISH_GEN_A_CORE
CT from 10/2023 showed The pancreas appears unremarkable without evidence of ductal dilatation or masses.   No

## 2025-02-27 ENCOUNTER — RX RENEWAL (OUTPATIENT)
Age: 70
End: 2025-02-27

## 2025-06-09 ENCOUNTER — APPOINTMENT (OUTPATIENT)
Dept: PULMONOLOGY | Facility: CLINIC | Age: 70
End: 2025-06-09

## 2025-08-29 ENCOUNTER — RX RENEWAL (OUTPATIENT)
Age: 70
End: 2025-08-29

## (undated) DEVICE — GLV 7.5 PROTEXIS (WHITE)

## (undated) DEVICE — SYR LUER LOK 30CC

## (undated) DEVICE — BASIN EMESIS 10IN GRADUATED MAUVE

## (undated) DEVICE — SPECIMEN CONTAINER 100ML

## (undated) DEVICE — DRSG OPSITE 2.5 X 2"

## (undated) DEVICE — LIGASURE IMPACT

## (undated) DEVICE — TROCAR COVIDIEN MINI STEP 2MM-3MM SHORT

## (undated) DEVICE — POSITIONER FOAM EGG CRATE ULNAR 2PCS (PINK)

## (undated) DEVICE — DRSG BANDAID 0.75X3"

## (undated) DEVICE — BLADE SCALPEL SAFETYLOCK #15

## (undated) DEVICE — VALVE YELLOW PORT SEAL PLUS 5MM

## (undated) DEVICE — DRSG STERISTRIPS 0.5 X 4"

## (undated) DEVICE — STOPCOCK 4-WAY W SWIVEL MALE LUER LOCK NON VENTED RED CAP

## (undated) DEVICE — GLV 6.5 PROTEXIS (WHITE)

## (undated) DEVICE — CATH IV SAFE INSYTE 14G X 1.75" (ORANGE)

## (undated) DEVICE — MEDICATION LABELS W MARKER

## (undated) DEVICE — SOL IRR POUR H2O 250ML

## (undated) DEVICE — DISSECTOR COVIDIEN ROTICULATOR 5MM W MONOPOLAR CAUTERY

## (undated) DEVICE — ELCTR GROUNDING PAD ADULT COVIDIEN

## (undated) DEVICE — TROCAR COVIDIEN VERSAONE BLADED FIXATION 11MM STANDARD

## (undated) DEVICE — TUBING IRRIGATION DAVOL SYSTEM X STREAM

## (undated) DEVICE — TUBING TRUWAVE PRESSURE MALE/FEMALE 72"

## (undated) DEVICE — TROCAR COVIDIEN VERSASTEP PLUS 12MM STANDARD

## (undated) DEVICE — BIOPSY FORCEP RADIAL JAW 4 STANDARD WITH NEEDLE

## (undated) DEVICE — SUT BIOSYN 4-0 18" P-12

## (undated) DEVICE — CATH IV SAFE BC 22G X 1" (BLUE)

## (undated) DEVICE — GRASPER COVIDIEN ENDO GRASP ROTICULATOR 5MM W SPIN LOCK

## (undated) DEVICE — DRAPE TOWEL BLUE 17" X 24"

## (undated) DEVICE — LUBRICATING JELLY HR ONE SHOT 3G

## (undated) DEVICE — LIGASURE MARYLAND 37CM

## (undated) DEVICE — DRSG 2X2

## (undated) DEVICE — GOWN LG

## (undated) DEVICE — TROCAR APPLIED MEDICAL KII BALLOON BLUNT TIP 12MM X 100MM

## (undated) DEVICE — DRAPE INSTRUMENT POUCH 6.75" X 11"

## (undated) DEVICE — SHEARS HARMONIC ACE 5MM X 36CM CURVED TIP

## (undated) DEVICE — SHEARS COVIDIEN ENDO SHEAR 5MM X 31CM W UNIPOLAR CAUTERY

## (undated) DEVICE — INSUFFLATION NDL COVIDIEN STEP 14G SHORT FOR STEP/VERSASTEP

## (undated) DEVICE — TUBING SUCTION NONCONDUCTIVE 6MM X 12FT

## (undated) DEVICE — PACK IV START WITH CHG

## (undated) DEVICE — D HELP - CLEARVIEW CLEARIFY SYSTEM

## (undated) DEVICE — TROCAR COVIDIEN VERSASTEP PLUS 11MM STANDARD

## (undated) DEVICE — TUBING IV SET GRAVITY 3Y 100" MACRO

## (undated) DEVICE — STOPCOCK 3 WAY W TUBE 35"

## (undated) DEVICE — SALIVA EJECTOR (BLUE)

## (undated) DEVICE — TROCAR COVIDIEN VERSASTEP SLEEVE

## (undated) DEVICE — CONTAINER FORMALIN 80ML YELLOW

## (undated) DEVICE — GLV 7 PROTEXIS (WHITE)

## (undated) DEVICE — SYR LUER LOK 20CC

## (undated) DEVICE — TROCAR ETHICON ENDOPATH XCEL BLADELESS 5MM X 100MM STABILITY

## (undated) DEVICE — PREP CHLORAPREP HI-LITE ORANGE 26ML

## (undated) DEVICE — INSUFFLATION NDL COVIDIEN STEP 14G FOR STEP/VERSASTEP

## (undated) DEVICE — BIOPSY FORCEP COLD DISP

## (undated) DEVICE — TUBING STRYKEFLOW II SUCTION / IRRIGATOR

## (undated) DEVICE — VENODYNE/SCD SLEEVE CALF LARGE

## (undated) DEVICE — NDL BIOPSY MONOPTY 18G X 20CM

## (undated) DEVICE — TUBING MEDI-VAC W MAXIGRIP CONNECTORS 1/4"X6'

## (undated) DEVICE — DRSG TELFA 3 X 8

## (undated) DEVICE — LIGASURE BLUNT TIP 37CM

## (undated) DEVICE — ELCTR ECG CONDUCTIVE ADHESIVE

## (undated) DEVICE — TUBING TUR 2 PRONG

## (undated) DEVICE — POLY TRAP ETRAP

## (undated) DEVICE — ENDOCATCH II 15MM

## (undated) DEVICE — TROCAR COVIDIEN BLUNT TIP HASSAN 10MM

## (undated) DEVICE — WARMING BLANKET UPPER ADULT

## (undated) DEVICE — TROCAR APPLIED MEDICAL KII BALLOON BLUNT TIP 12MM X 130MM

## (undated) DEVICE — ENDOCATCH GENERAL 10MM (PURPLE)

## (undated) DEVICE — TROCAR COVIDIEN STEP 5MM SHORT 70MM

## (undated) DEVICE — MARKING PEN W RULER

## (undated) DEVICE — TUBING INSUFFLATION LAP FILTER 10FT

## (undated) DEVICE — INSUFFLATION NDL COVIDIEN VERSASTEP 14G LONG

## (undated) DEVICE — GLV 8.5 PROTEXIS (WHITE)

## (undated) DEVICE — DRSG CURITY GAUZE SPONGE 4 X 4" 12-PLY NON-STERILE

## (undated) DEVICE — ENDOCATCH 10MM SPECIMEN POUCH

## (undated) DEVICE — BLADE SCALPEL SAFETYLOCK #10

## (undated) DEVICE — PACK LAP CHOLE LAP APPENDECTOMY

## (undated) DEVICE — SUT POLYSORB 0 36" GU-46

## (undated) DEVICE — DRAPE GENERAL ENDOSCOPY

## (undated) DEVICE — SNARE DISP

## (undated) DEVICE — DRAPE C ARM UNIVERSAL

## (undated) DEVICE — SOL IRR POUR NS 0.9% 500ML

## (undated) DEVICE — SHEARS COVIDIEN ROTICULATOR ENDO MINI 5MM WITH UNIPOLAR CAUTERY

## (undated) DEVICE — GLV 8 PROTEXIS (WHITE)

## (undated) DEVICE — DRAPE MAYO STAND 30"

## (undated) DEVICE — GOWN TRIMAX LG

## (undated) DEVICE — SUT PDS II 0 18" ENDOLOOP LIGATURE